# Patient Record
Sex: FEMALE | Race: WHITE | NOT HISPANIC OR LATINO | URBAN - METROPOLITAN AREA
[De-identification: names, ages, dates, MRNs, and addresses within clinical notes are randomized per-mention and may not be internally consistent; named-entity substitution may affect disease eponyms.]

---

## 2017-01-20 ENCOUNTER — INPATIENT (INPATIENT)
Facility: HOSPITAL | Age: 72
LOS: 2 days | Discharge: HOME | DRG: 196 | End: 2017-01-23
Attending: HOSPITALIST | Admitting: HOSPITALIST
Payer: COMMERCIAL

## 2017-01-20 VITALS
WEIGHT: 154.98 LBS | TEMPERATURE: 98 F | HEIGHT: 66 IN | HEART RATE: 132 BPM | OXYGEN SATURATION: 95 % | DIASTOLIC BLOOD PRESSURE: 61 MMHG | RESPIRATION RATE: 18 BRPM | SYSTOLIC BLOOD PRESSURE: 114 MMHG

## 2017-01-20 DIAGNOSIS — Z90.710 ACQUIRED ABSENCE OF BOTH CERVIX AND UTERUS: Chronic | ICD-10-CM

## 2017-01-20 DIAGNOSIS — E11.9 TYPE 2 DIABETES MELLITUS WITHOUT COMPLICATIONS: ICD-10-CM

## 2017-01-20 DIAGNOSIS — I27.2 OTHER SECONDARY PULMONARY HYPERTENSION: ICD-10-CM

## 2017-01-20 DIAGNOSIS — Z29.9 ENCOUNTER FOR PROPHYLACTIC MEASURES, UNSPECIFIED: ICD-10-CM

## 2017-01-20 DIAGNOSIS — R06.02 SHORTNESS OF BREATH: ICD-10-CM

## 2017-01-20 DIAGNOSIS — K52.9 NONINFECTIVE GASTROENTERITIS AND COLITIS, UNSPECIFIED: ICD-10-CM

## 2017-01-20 LAB
ALBUMIN SERPL ELPH-MCNC: 3.7 G/DL — SIGNIFICANT CHANGE UP (ref 3.3–5)
ALP SERPL-CCNC: 50 U/L — SIGNIFICANT CHANGE UP (ref 40–120)
ALT FLD-CCNC: 15 U/L — SIGNIFICANT CHANGE UP (ref 12–78)
ANION GAP SERPL CALC-SCNC: 9 MMOL/L — SIGNIFICANT CHANGE UP (ref 5–17)
AST SERPL-CCNC: 17 U/L — SIGNIFICANT CHANGE UP (ref 15–37)
BASE EXCESS BLDA CALC-SCNC: 5.3 MMOL/L — HIGH (ref -2–2)
BILIRUB SERPL-MCNC: 0.6 MG/DL — SIGNIFICANT CHANGE UP (ref 0.2–1.2)
BLOOD GAS COMMENTS ARTERIAL: SIGNIFICANT CHANGE UP
BUN SERPL-MCNC: 27 MG/DL — HIGH (ref 7–23)
CALCIUM SERPL-MCNC: 9.1 MG/DL — SIGNIFICANT CHANGE UP (ref 8.5–10.1)
CHLORIDE SERPL-SCNC: 95 MMOL/L — LOW (ref 96–108)
CK SERPL-CCNC: 28 U/L — SIGNIFICANT CHANGE UP (ref 26–192)
CO2 SERPL-SCNC: 38 MMOL/L — HIGH (ref 22–31)
CREAT SERPL-MCNC: 0.8 MG/DL — SIGNIFICANT CHANGE UP (ref 0.5–1.3)
CRP SERPL-MCNC: 0.86 MG/DL — HIGH (ref 0–0.4)
D DIMER BLD IA.RAPID-MCNC: <150 NG/ML DDU — SIGNIFICANT CHANGE UP
D DIMER BLD IA.RAPID-MCNC: <150 NG/ML DDU — SIGNIFICANT CHANGE UP
GLUCOSE SERPL-MCNC: 170 MG/DL — HIGH (ref 70–99)
HCO3 BLDA-SCNC: 29 MMOL/L — HIGH (ref 23–27)
HCT VFR BLD CALC: 45.3 % — HIGH (ref 34.5–45)
HGB BLD-MCNC: 14.6 G/DL — SIGNIFICANT CHANGE UP (ref 11.5–15.5)
HOROWITZ INDEX BLDA+IHG-RTO: 40 — SIGNIFICANT CHANGE UP
INR BLD: 1.11 RATIO — SIGNIFICANT CHANGE UP (ref 0.88–1.16)
MCHC RBC-ENTMCNC: 30.5 PG — SIGNIFICANT CHANGE UP (ref 27–34)
MCHC RBC-ENTMCNC: 32.1 GM/DL — SIGNIFICANT CHANGE UP (ref 32–36)
MCV RBC AUTO: 95 FL — SIGNIFICANT CHANGE UP (ref 80–100)
NT-PROBNP SERPL-SCNC: 2498 PG/ML — HIGH (ref 0–125)
PCO2 BLDA: 46 MMHG — SIGNIFICANT CHANGE UP (ref 32–46)
PH BLDA: 7.42 — SIGNIFICANT CHANGE UP (ref 7.35–7.45)
PLATELET # BLD AUTO: 319 K/UL — SIGNIFICANT CHANGE UP (ref 150–400)
PO2 BLDA: 96 MMHG — SIGNIFICANT CHANGE UP (ref 74–108)
POTASSIUM SERPL-MCNC: 3.8 MMOL/L — SIGNIFICANT CHANGE UP (ref 3.5–5.3)
POTASSIUM SERPL-SCNC: 3.8 MMOL/L — SIGNIFICANT CHANGE UP (ref 3.5–5.3)
PROCALCITONIN SERPL-MCNC: <0.05 NG/ML — SIGNIFICANT CHANGE UP (ref 0–0.05)
PROT SERPL-MCNC: 7.2 G/DL — SIGNIFICANT CHANGE UP (ref 6–8.3)
PROTHROM AB SERPL-ACNC: 12.3 SEC — SIGNIFICANT CHANGE UP (ref 10–13.1)
RAPID RVP RESULT: SIGNIFICANT CHANGE UP
RBC # BLD: 4.77 M/UL — SIGNIFICANT CHANGE UP (ref 3.8–5.2)
RBC # FLD: 12.7 % — SIGNIFICANT CHANGE UP (ref 10.3–14.5)
SAO2 % BLDA: 94 % — SIGNIFICANT CHANGE UP (ref 92–96)
SODIUM SERPL-SCNC: 142 MMOL/L — SIGNIFICANT CHANGE UP (ref 135–145)
TROPONIN I SERPL-MCNC: 0.02 NG/ML — SIGNIFICANT CHANGE UP (ref 0.01–0.04)
TSH SERPL-MCNC: 3.67 UIU/ML — SIGNIFICANT CHANGE UP (ref 0.36–3.74)
WBC # BLD: 18.8 K/UL — HIGH (ref 3.8–10.5)
WBC # FLD AUTO: 18.8 K/UL — HIGH (ref 3.8–10.5)

## 2017-01-20 PROCEDURE — 93970 EXTREMITY STUDY: CPT | Mod: 26

## 2017-01-20 PROCEDURE — 71010: CPT | Mod: 26

## 2017-01-20 PROCEDURE — 99223 1ST HOSP IP/OBS HIGH 75: CPT | Mod: AI,GC

## 2017-01-20 PROCEDURE — 93010 ELECTROCARDIOGRAM REPORT: CPT

## 2017-01-20 PROCEDURE — 99285 EMERGENCY DEPT VISIT HI MDM: CPT | Mod: 25

## 2017-01-20 RX ORDER — ENOXAPARIN SODIUM 100 MG/ML
40 INJECTION SUBCUTANEOUS EVERY 24 HOURS
Qty: 0 | Refills: 0 | Status: DISCONTINUED | OUTPATIENT
Start: 2017-01-20 | End: 2017-01-23

## 2017-01-20 RX ORDER — ALBUTEROL 90 UG/1
2 AEROSOL, METERED ORAL EVERY 6 HOURS
Qty: 0 | Refills: 0 | Status: DISCONTINUED | OUTPATIENT
Start: 2017-01-20 | End: 2017-01-23

## 2017-01-20 RX ORDER — INSULIN LISPRO 100/ML
VIAL (ML) SUBCUTANEOUS AT BEDTIME
Qty: 0 | Refills: 0 | Status: DISCONTINUED | OUTPATIENT
Start: 2017-01-20 | End: 2017-01-23

## 2017-01-20 RX ORDER — SODIUM CHLORIDE 9 MG/ML
500 INJECTION INTRAMUSCULAR; INTRAVENOUS; SUBCUTANEOUS ONCE
Qty: 0 | Refills: 0 | Status: COMPLETED | OUTPATIENT
Start: 2017-01-20 | End: 2017-01-20

## 2017-01-20 RX ORDER — PANTOPRAZOLE SODIUM 20 MG/1
40 TABLET, DELAYED RELEASE ORAL
Qty: 0 | Refills: 0 | Status: DISCONTINUED | OUTPATIENT
Start: 2017-01-20 | End: 2017-01-23

## 2017-01-20 RX ORDER — GLUCAGON INJECTION, SOLUTION 0.5 MG/.1ML
1 INJECTION, SOLUTION SUBCUTANEOUS ONCE
Qty: 0 | Refills: 0 | Status: DISCONTINUED | OUTPATIENT
Start: 2017-01-20 | End: 2017-01-23

## 2017-01-20 RX ORDER — ALBUTEROL 90 UG/1
2.5 AEROSOL, METERED ORAL EVERY 8 HOURS
Qty: 0 | Refills: 0 | Status: DISCONTINUED | OUTPATIENT
Start: 2017-01-20 | End: 2017-01-23

## 2017-01-20 RX ORDER — TADALAFIL 10 MG/1
40 TABLET, FILM COATED ORAL
Qty: 0 | Refills: 0 | Status: DISCONTINUED | OUTPATIENT
Start: 2017-01-21 | End: 2017-01-23

## 2017-01-20 RX ORDER — DEXTROSE 50 % IN WATER 50 %
1 SYRINGE (ML) INTRAVENOUS ONCE
Qty: 0 | Refills: 0 | Status: DISCONTINUED | OUTPATIENT
Start: 2017-01-20 | End: 2017-01-23

## 2017-01-20 RX ORDER — BUDESONIDE, MICRONIZED 100 %
1 POWDER (GRAM) MISCELLANEOUS
Qty: 0 | Refills: 0 | Status: DISCONTINUED | OUTPATIENT
Start: 2017-01-20 | End: 2017-01-23

## 2017-01-20 RX ORDER — SIMVASTATIN 20 MG/1
20 TABLET, FILM COATED ORAL AT BEDTIME
Qty: 0 | Refills: 0 | Status: DISCONTINUED | OUTPATIENT
Start: 2017-01-20 | End: 2017-01-23

## 2017-01-20 RX ORDER — BUDESONIDE, MICRONIZED 100 %
1 POWDER (GRAM) MISCELLANEOUS
Qty: 0 | Refills: 0 | COMMUNITY

## 2017-01-20 RX ORDER — MORPHINE SULFATE 50 MG/1
0.5 CAPSULE, EXTENDED RELEASE ORAL
Qty: 0 | Refills: 0 | Status: DISCONTINUED | OUTPATIENT
Start: 2017-01-20 | End: 2017-01-23

## 2017-01-20 RX ORDER — ONDANSETRON 8 MG/1
4 TABLET, FILM COATED ORAL ONCE
Qty: 0 | Refills: 0 | Status: COMPLETED | OUTPATIENT
Start: 2017-01-20 | End: 2017-01-20

## 2017-01-20 RX ORDER — IPRATROPIUM/ALBUTEROL SULFATE 18-103MCG
3 AEROSOL WITH ADAPTER (GRAM) INHALATION ONCE
Qty: 0 | Refills: 0 | Status: COMPLETED | OUTPATIENT
Start: 2017-01-20 | End: 2017-01-20

## 2017-01-20 RX ORDER — SODIUM CHLORIDE 9 MG/ML
1000 INJECTION INTRAMUSCULAR; INTRAVENOUS; SUBCUTANEOUS ONCE
Qty: 0 | Refills: 0 | Status: COMPLETED | OUTPATIENT
Start: 2017-01-20 | End: 2017-01-20

## 2017-01-20 RX ORDER — INSULIN LISPRO 100/ML
VIAL (ML) SUBCUTANEOUS
Qty: 0 | Refills: 0 | Status: DISCONTINUED | OUTPATIENT
Start: 2017-01-20 | End: 2017-01-23

## 2017-01-20 RX ORDER — DEXTROSE 50 % IN WATER 50 %
25 SYRINGE (ML) INTRAVENOUS ONCE
Qty: 0 | Refills: 0 | Status: DISCONTINUED | OUTPATIENT
Start: 2017-01-20 | End: 2017-01-23

## 2017-01-20 RX ORDER — MYCOPHENOLATE MOFETIL 250 MG/1
1000 CAPSULE ORAL
Qty: 0 | Refills: 0 | Status: DISCONTINUED | OUTPATIENT
Start: 2017-01-20 | End: 2017-01-23

## 2017-01-20 RX ORDER — DEXTROSE 50 % IN WATER 50 %
12.5 SYRINGE (ML) INTRAVENOUS ONCE
Qty: 0 | Refills: 0 | Status: DISCONTINUED | OUTPATIENT
Start: 2017-01-20 | End: 2017-01-23

## 2017-01-20 RX ORDER — MYCOPHENOLATE MOFETIL 250 MG/1
1000 CAPSULE ORAL
Qty: 0 | Refills: 0 | COMMUNITY

## 2017-01-20 RX ORDER — SODIUM CHLORIDE 9 MG/ML
1000 INJECTION, SOLUTION INTRAVENOUS
Qty: 0 | Refills: 0 | Status: DISCONTINUED | OUTPATIENT
Start: 2017-01-20 | End: 2017-01-23

## 2017-01-20 RX ORDER — TREPROSTINIL 48 UG/1
18 INHALANT ORAL
Qty: 0 | Refills: 0 | Status: DISCONTINUED | OUTPATIENT
Start: 2017-01-20 | End: 2017-01-20

## 2017-01-20 RX ORDER — DIGOXIN 250 MCG
0.12 TABLET ORAL DAILY
Qty: 0 | Refills: 0 | Status: DISCONTINUED | OUTPATIENT
Start: 2017-01-20 | End: 2017-01-21

## 2017-01-20 RX ORDER — SODIUM CHLORIDE 9 MG/ML
500 INJECTION INTRAMUSCULAR; INTRAVENOUS; SUBCUTANEOUS
Qty: 0 | Refills: 0 | Status: DISCONTINUED | OUTPATIENT
Start: 2017-01-20 | End: 2017-01-20

## 2017-01-20 RX ADMIN — Medication 0.12 MILLIGRAM(S): at 17:35

## 2017-01-20 RX ADMIN — MORPHINE SULFATE 0.5 MILLIGRAM(S): 50 CAPSULE, EXTENDED RELEASE ORAL at 10:58

## 2017-01-20 RX ADMIN — Medication 2: at 08:17

## 2017-01-20 RX ADMIN — ALBUTEROL 2.5 MILLIGRAM(S): 90 AEROSOL, METERED ORAL at 07:39

## 2017-01-20 RX ADMIN — Medication 3 MILLILITER(S): at 02:45

## 2017-01-20 RX ADMIN — ONDANSETRON 4 MILLIGRAM(S): 8 TABLET, FILM COATED ORAL at 02:44

## 2017-01-20 RX ADMIN — Medication 125 MILLIGRAM(S): at 02:44

## 2017-01-20 RX ADMIN — Medication 1: at 17:05

## 2017-01-20 RX ADMIN — SODIUM CHLORIDE 250 MILLILITER(S): 9 INJECTION INTRAMUSCULAR; INTRAVENOUS; SUBCUTANEOUS at 17:32

## 2017-01-20 RX ADMIN — Medication 40 MILLIGRAM(S): at 17:05

## 2017-01-20 RX ADMIN — SIMVASTATIN 20 MILLIGRAM(S): 20 TABLET, FILM COATED ORAL at 23:19

## 2017-01-20 RX ADMIN — MORPHINE SULFATE 0.5 MILLIGRAM(S): 50 CAPSULE, EXTENDED RELEASE ORAL at 11:15

## 2017-01-20 RX ADMIN — PANTOPRAZOLE SODIUM 40 MILLIGRAM(S): 20 TABLET, DELAYED RELEASE ORAL at 08:17

## 2017-01-20 RX ADMIN — Medication 2: at 11:43

## 2017-01-20 RX ADMIN — SODIUM CHLORIDE 1000 MILLILITER(S): 9 INJECTION INTRAMUSCULAR; INTRAVENOUS; SUBCUTANEOUS at 02:43

## 2017-01-20 RX ADMIN — MYCOPHENOLATE MOFETIL 1000 MILLIGRAM(S): 250 CAPSULE ORAL at 17:06

## 2017-01-20 RX ADMIN — ENOXAPARIN SODIUM 40 MILLIGRAM(S): 100 INJECTION SUBCUTANEOUS at 10:59

## 2017-01-20 RX ADMIN — ALBUTEROL 2.5 MILLIGRAM(S): 90 AEROSOL, METERED ORAL at 16:17

## 2017-01-20 RX ADMIN — ALBUTEROL 2.5 MILLIGRAM(S): 90 AEROSOL, METERED ORAL at 23:07

## 2017-01-20 NOTE — GOALS OF CARE CONVERSATION - PERSONAL ADVANCE DIRECTIVE - NS PRO AD PATIENT TYPE ON CHART
Medical Orders for Life-Sustaining Treatment (MOLST)/Health Care Proxy (HCP)/Do Not Resuscitate (DNR)

## 2017-01-20 NOTE — ED PROVIDER NOTE - CARE PLAN
Principal Discharge DX:	SOB (shortness of breath)  Secondary Diagnosis:	Nausea & vomiting Principal Discharge DX:	SOB (shortness of breath)  Secondary Diagnosis:	Pulmonary fibrosis  Secondary Diagnosis:	Nausea & vomiting

## 2017-01-20 NOTE — ED PROVIDER NOTE - SIGNIFICANT NEGATIVE FINDINGS
no headache, no chest pain, no Syncope, no palpitations, no abdominal pain, no diarrhea, no urinary symptoms, no bleeding. no neuro changes.

## 2017-01-20 NOTE — H&P ADULT. - RS GEN PE MLT RESP DETAILS PC
respirations labored/breath sounds equal/diminished breath sounds, L/airway patent/diminished breath sounds, R/rhonchi

## 2017-01-20 NOTE — H&P ADULT. - PROBLEM SELECTOR PLAN 1
cont. on solumedrol 60 q8  Pulm (Rashaad) consult, f/u recs  duonebs q6  cont on nonrebreather temporary, await for pulm recs, possible ABG  monitor O2 sats cont. on solumedrol 60 q8  Pulm (Rashaad) consult, f/u recs  duonebs q6  cont on nonrebreather temporary, await for pulm recs, possible ABG/Bipap  monitor O2 sats  continue home meds ( check w [facility] upon opening)   Patient to bring home meds if unavailable cont. on solumedrol 60 q8  Pulm (Rsahaad) consult, f/u recs  duonebs  cont on nonrebreather temporary, await for pulm recs, possible ABG/Bipap  monitor O2 sats  continue home meds (check w [facility] upon opening)   Patient to bring home meds if unavailable Admit to tele  cont. on solumedrol 60 q8  Pulm (Rashaad) consult, f/u recs  duonebs  cont cellcept, mucinex DM, hctz   cont on nonrebreather temporary, await for pulm recs, possible ABG/Bipap  monitor O2 sats  continue home meds (check w [facility] upon opening)   Patient to bring home meds if unavailable-- Adcirca, Tyvaso

## 2017-01-20 NOTE — ED ADULT NURSE NOTE - OBJECTIVE STATEMENT
Pt presents to the Ed via ambulance c/o difficulty breathing, nausea, vomiting.. Pt uses home 02 with a compressor. Pt presents to the Ed via ambulance c/o difficulty breathing, nausea, vomiting.. Pt uses home 02 with a compressor.. EKG done, pt [id number] on cardiac monitor 100% NRB mask in place. Skin warm and dry, + sensation, + capillary refill <2 sec.

## 2017-01-20 NOTE — H&P ADULT. - ASSESSMENT
72 y/o F with PMHx Pulm HTN, DM2 presents with SOB, vomiting being admitted for Pulm HTN, Gastroenteritis.

## 2017-01-20 NOTE — H&P ADULT. - PROBLEM SELECTOR PLAN 4
DVT ppx: Lovenox 40 subQ q24  HLD: cont simvastatin DVT ppx: Lovenox 40 subQ q24  HLD: cont simvastatin  GI: Protonix

## 2017-01-20 NOTE — ED PROVIDER NOTE - CONSTITUTIONAL, MLM
normal... Well appearing, well nourished, awake, alert, oriented to person, place, time/situation and in moderate  distress.

## 2017-01-20 NOTE — GOALS OF CARE CONVERSATION - PERSONAL ADVANCE DIRECTIVE - NS PRO AD PATIENT TYPE
Health Care Proxy (HCP)/Do Not Resuscitate (DNR)/Medical Orders for Life-Sustaining Treatment (MOLST)/Living Will

## 2017-01-20 NOTE — H&P ADULT. - HISTORY OF PRESENT ILLNESS
72 y/o w/f h/o pulmonary fibrosis, pulmonary HTN, DM. C/O SOB following Nausea and four episodes of vomiting. No chest pain, no abdominal pain 70 y/o F with PMHx Pulm HTN, DM2 presents with SOB, vomiting. Pt states sx began 3-4 days ago, didn't feel right, getting very tired, associated with stomach uneasiness. Pt lives with kids who were sick with likely viral illness (+n/v/d),  also got sick. Yesterday at 0200 pt had difficulty breathing, diaphoretic, vomiting x 4 episodes, with crampy abdominal pain, soft formed stool. Today, had decreased appetite, low blood sugar. Pt had recurrent episodes of vomiting in bed overnight, prompting her to come to ED. Pt closely followed by PMD/Pulmonologist @ [place]. Currently pt denies fever, chills, chest pain, abd pain, n/v, numbness tingling.     In ED, VS: T98, , /61, RR 18 SpO2 95% on Supp O2   ED labs significant for: wbc 18.8, BNP 2498, Trop (-)  Pt placed on Non-rebreather, received Duonebs x1, Solumedrol 125mg IV x1, Zofran 4mg IV x1  CXR official read pending

## 2017-01-20 NOTE — ED ADULT NURSE REASSESSMENT NOTE - NS ED NURSE REASSESS COMMENT FT1
Pt received sitting up on stretcher with HOB elevated 90 degrees, O2 100%via NRB I progress. Fluid bolus to IV left arm infusing well. BBS  with b/l mild expiratory wheezes. To be admitted, sinus tach on monitor

## 2017-01-20 NOTE — ED PROVIDER NOTE - OBJECTIVE STATEMENT
70 y/o w/f h/o pulmonary fibrosis, pulmonary HTN, DM. C/O SOB following Nausea and four episodes of vomiting. No chest pain, no abdominal pain

## 2017-01-21 LAB
ANION GAP SERPL CALC-SCNC: 6 MMOL/L — SIGNIFICANT CHANGE UP (ref 5–17)
BUN SERPL-MCNC: 20 MG/DL — SIGNIFICANT CHANGE UP (ref 7–23)
CALCIUM SERPL-MCNC: 7.8 MG/DL — LOW (ref 8.5–10.1)
CHLORIDE SERPL-SCNC: 103 MMOL/L — SIGNIFICANT CHANGE UP (ref 96–108)
CO2 SERPL-SCNC: 33 MMOL/L — HIGH (ref 22–31)
CREAT SERPL-MCNC: 0.58 MG/DL — SIGNIFICANT CHANGE UP (ref 0.5–1.3)
GLUCOSE SERPL-MCNC: 154 MG/DL — HIGH (ref 70–99)
HCT VFR BLD CALC: 37.6 % — SIGNIFICANT CHANGE UP (ref 34.5–45)
HGB BLD-MCNC: 11.6 G/DL — SIGNIFICANT CHANGE UP (ref 11.5–15.5)
MAGNESIUM SERPL-MCNC: 2.3 MG/DL — SIGNIFICANT CHANGE UP (ref 1.8–2.4)
MCHC RBC-ENTMCNC: 30.1 PG — SIGNIFICANT CHANGE UP (ref 27–34)
MCHC RBC-ENTMCNC: 31 GM/DL — LOW (ref 32–36)
MCV RBC AUTO: 97.3 FL — SIGNIFICANT CHANGE UP (ref 80–100)
PLATELET # BLD AUTO: 240 K/UL — SIGNIFICANT CHANGE UP (ref 150–400)
POTASSIUM SERPL-MCNC: 4.3 MMOL/L — SIGNIFICANT CHANGE UP (ref 3.5–5.3)
POTASSIUM SERPL-SCNC: 4.3 MMOL/L — SIGNIFICANT CHANGE UP (ref 3.5–5.3)
RBC # BLD: 3.86 M/UL — SIGNIFICANT CHANGE UP (ref 3.8–5.2)
RBC # FLD: 13.6 % — SIGNIFICANT CHANGE UP (ref 10.3–14.5)
SODIUM SERPL-SCNC: 142 MMOL/L — SIGNIFICANT CHANGE UP (ref 135–145)
WBC # BLD: 9.3 K/UL — SIGNIFICANT CHANGE UP (ref 3.8–10.5)
WBC # FLD AUTO: 9.3 K/UL — SIGNIFICANT CHANGE UP (ref 3.8–10.5)

## 2017-01-21 PROCEDURE — 99233 SBSQ HOSP IP/OBS HIGH 50: CPT

## 2017-01-21 RX ORDER — NYSTATIN CREAM 100000 [USP'U]/G
1 CREAM TOPICAL
Qty: 0 | Refills: 0 | Status: DISCONTINUED | OUTPATIENT
Start: 2017-01-21 | End: 2017-01-23

## 2017-01-21 RX ADMIN — NYSTATIN CREAM 1 APPLICATION(S): 100000 CREAM TOPICAL at 17:04

## 2017-01-21 RX ADMIN — ALBUTEROL 2.5 MILLIGRAM(S): 90 AEROSOL, METERED ORAL at 15:10

## 2017-01-21 RX ADMIN — ALBUTEROL 2.5 MILLIGRAM(S): 90 AEROSOL, METERED ORAL at 09:49

## 2017-01-21 RX ADMIN — MYCOPHENOLATE MOFETIL 1000 MILLIGRAM(S): 250 CAPSULE ORAL at 06:27

## 2017-01-21 RX ADMIN — Medication 40 MILLIGRAM(S): at 17:04

## 2017-01-21 RX ADMIN — ENOXAPARIN SODIUM 40 MILLIGRAM(S): 100 INJECTION SUBCUTANEOUS at 09:50

## 2017-01-21 RX ADMIN — SIMVASTATIN 20 MILLIGRAM(S): 20 TABLET, FILM COATED ORAL at 22:58

## 2017-01-21 RX ADMIN — Medication 40 MILLIGRAM(S): at 06:29

## 2017-01-21 RX ADMIN — Medication 2: at 11:56

## 2017-01-21 RX ADMIN — ALBUTEROL 2.5 MILLIGRAM(S): 90 AEROSOL, METERED ORAL at 23:03

## 2017-01-21 RX ADMIN — TADALAFIL 40 MILLIGRAM(S): 10 TABLET, FILM COATED ORAL at 07:55

## 2017-01-21 RX ADMIN — MYCOPHENOLATE MOFETIL 1000 MILLIGRAM(S): 250 CAPSULE ORAL at 17:04

## 2017-01-21 RX ADMIN — Medication 0.12 MILLIGRAM(S): at 06:28

## 2017-01-21 RX ADMIN — NYSTATIN CREAM 1 APPLICATION(S): 100000 CREAM TOPICAL at 11:56

## 2017-01-21 RX ADMIN — PANTOPRAZOLE SODIUM 40 MILLIGRAM(S): 20 TABLET, DELAYED RELEASE ORAL at 06:29

## 2017-01-21 RX ADMIN — Medication 1: at 17:04

## 2017-01-22 LAB
ANION GAP SERPL CALC-SCNC: 8 MMOL/L — SIGNIFICANT CHANGE UP (ref 5–17)
BUN SERPL-MCNC: 27 MG/DL — HIGH (ref 7–23)
CALCIUM SERPL-MCNC: 8.5 MG/DL — SIGNIFICANT CHANGE UP (ref 8.5–10.1)
CHLORIDE SERPL-SCNC: 100 MMOL/L — SIGNIFICANT CHANGE UP (ref 96–108)
CO2 SERPL-SCNC: 32 MMOL/L — HIGH (ref 22–31)
CREAT SERPL-MCNC: 0.71 MG/DL — SIGNIFICANT CHANGE UP (ref 0.5–1.3)
GLUCOSE SERPL-MCNC: 178 MG/DL — HIGH (ref 70–99)
HBA1C BLD-MCNC: 6.4 % — HIGH (ref 4–5.6)
HCT VFR BLD CALC: 38.9 % — SIGNIFICANT CHANGE UP (ref 34.5–45)
HGB BLD-MCNC: 11.9 G/DL — SIGNIFICANT CHANGE UP (ref 11.5–15.5)
MCHC RBC-ENTMCNC: 30.2 PG — SIGNIFICANT CHANGE UP (ref 27–34)
MCHC RBC-ENTMCNC: 30.6 GM/DL — LOW (ref 32–36)
MCV RBC AUTO: 99 FL — SIGNIFICANT CHANGE UP (ref 80–100)
PLATELET # BLD AUTO: 255 K/UL — SIGNIFICANT CHANGE UP (ref 150–400)
POTASSIUM SERPL-MCNC: 4.5 MMOL/L — SIGNIFICANT CHANGE UP (ref 3.5–5.3)
POTASSIUM SERPL-SCNC: 4.5 MMOL/L — SIGNIFICANT CHANGE UP (ref 3.5–5.3)
RBC # BLD: 3.93 M/UL — SIGNIFICANT CHANGE UP (ref 3.8–5.2)
RBC # FLD: 13.7 % — SIGNIFICANT CHANGE UP (ref 10.3–14.5)
SODIUM SERPL-SCNC: 140 MMOL/L — SIGNIFICANT CHANGE UP (ref 135–145)
WBC # BLD: 9.5 K/UL — SIGNIFICANT CHANGE UP (ref 3.8–10.5)
WBC # FLD AUTO: 9.5 K/UL — SIGNIFICANT CHANGE UP (ref 3.8–10.5)

## 2017-01-22 PROCEDURE — 99233 SBSQ HOSP IP/OBS HIGH 50: CPT

## 2017-01-22 RX ORDER — DOCUSATE SODIUM 100 MG
100 CAPSULE ORAL THREE TIMES A DAY
Qty: 0 | Refills: 0 | Status: DISCONTINUED | OUTPATIENT
Start: 2017-01-22 | End: 2017-01-23

## 2017-01-22 RX ORDER — SENNA PLUS 8.6 MG/1
2 TABLET ORAL AT BEDTIME
Qty: 0 | Refills: 0 | Status: DISCONTINUED | OUTPATIENT
Start: 2017-01-22 | End: 2017-01-23

## 2017-01-22 RX ADMIN — Medication 30 MILLIGRAM(S): at 18:32

## 2017-01-22 RX ADMIN — ALBUTEROL 2.5 MILLIGRAM(S): 90 AEROSOL, METERED ORAL at 14:23

## 2017-01-22 RX ADMIN — Medication 1 DROP(S): at 22:58

## 2017-01-22 RX ADMIN — ALBUTEROL 2 PUFF(S): 90 AEROSOL, METERED ORAL at 08:32

## 2017-01-22 RX ADMIN — Medication 1: at 08:44

## 2017-01-22 RX ADMIN — NYSTATIN CREAM 1 APPLICATION(S): 100000 CREAM TOPICAL at 06:46

## 2017-01-22 RX ADMIN — MYCOPHENOLATE MOFETIL 1000 MILLIGRAM(S): 250 CAPSULE ORAL at 18:33

## 2017-01-22 RX ADMIN — TADALAFIL 40 MILLIGRAM(S): 10 TABLET, FILM COATED ORAL at 08:31

## 2017-01-22 RX ADMIN — PANTOPRAZOLE SODIUM 40 MILLIGRAM(S): 20 TABLET, DELAYED RELEASE ORAL at 06:47

## 2017-01-22 RX ADMIN — ALBUTEROL 2.5 MILLIGRAM(S): 90 AEROSOL, METERED ORAL at 07:59

## 2017-01-22 RX ADMIN — Medication 1 PUFF(S): at 19:55

## 2017-01-22 RX ADMIN — Medication 100 MILLIGRAM(S): at 22:58

## 2017-01-22 RX ADMIN — Medication 1 DROP(S): at 13:36

## 2017-01-22 RX ADMIN — SENNA PLUS 2 TABLET(S): 8.6 TABLET ORAL at 22:58

## 2017-01-22 RX ADMIN — Medication 100 MILLIGRAM(S): at 06:47

## 2017-01-22 RX ADMIN — Medication 3: at 12:21

## 2017-01-22 RX ADMIN — NYSTATIN CREAM 1 APPLICATION(S): 100000 CREAM TOPICAL at 18:36

## 2017-01-22 RX ADMIN — Medication 1 DROP(S): at 06:47

## 2017-01-22 RX ADMIN — MYCOPHENOLATE MOFETIL 1000 MILLIGRAM(S): 250 CAPSULE ORAL at 06:47

## 2017-01-22 RX ADMIN — Medication 30 MILLIGRAM(S): at 06:47

## 2017-01-22 RX ADMIN — ALBUTEROL 2.5 MILLIGRAM(S): 90 AEROSOL, METERED ORAL at 23:04

## 2017-01-22 RX ADMIN — SIMVASTATIN 20 MILLIGRAM(S): 20 TABLET, FILM COATED ORAL at 22:58

## 2017-01-22 RX ADMIN — ENOXAPARIN SODIUM 40 MILLIGRAM(S): 100 INJECTION SUBCUTANEOUS at 10:05

## 2017-01-23 VITALS
HEART RATE: 96 BPM | TEMPERATURE: 98 F | SYSTOLIC BLOOD PRESSURE: 130 MMHG | OXYGEN SATURATION: 98 % | DIASTOLIC BLOOD PRESSURE: 83 MMHG | RESPIRATION RATE: 20 BRPM

## 2017-01-23 PROCEDURE — 99239 HOSP IP/OBS DSCHRG MGMT >30: CPT

## 2017-01-23 RX ADMIN — NYSTATIN CREAM 1 APPLICATION(S): 100000 CREAM TOPICAL at 06:22

## 2017-01-23 RX ADMIN — Medication 20 MILLIGRAM(S): at 06:21

## 2017-01-23 RX ADMIN — Medication 1 DROP(S): at 13:11

## 2017-01-23 RX ADMIN — Medication 20 MILLIGRAM(S): at 17:53

## 2017-01-23 RX ADMIN — PANTOPRAZOLE SODIUM 40 MILLIGRAM(S): 20 TABLET, DELAYED RELEASE ORAL at 06:21

## 2017-01-23 RX ADMIN — TADALAFIL 40 MILLIGRAM(S): 10 TABLET, FILM COATED ORAL at 08:44

## 2017-01-23 RX ADMIN — Medication 1 DROP(S): at 06:21

## 2017-01-23 RX ADMIN — Medication 1 PUFF(S): at 08:07

## 2017-01-23 RX ADMIN — Medication 100 MILLIGRAM(S): at 13:11

## 2017-01-23 RX ADMIN — ENOXAPARIN SODIUM 40 MILLIGRAM(S): 100 INJECTION SUBCUTANEOUS at 13:09

## 2017-01-23 RX ADMIN — Medication 1: at 13:10

## 2017-01-23 RX ADMIN — MYCOPHENOLATE MOFETIL 1000 MILLIGRAM(S): 250 CAPSULE ORAL at 06:21

## 2017-01-23 RX ADMIN — ALBUTEROL 2.5 MILLIGRAM(S): 90 AEROSOL, METERED ORAL at 08:04

## 2017-01-23 RX ADMIN — Medication 100 MILLIGRAM(S): at 06:22

## 2017-01-23 RX ADMIN — MYCOPHENOLATE MOFETIL 1000 MILLIGRAM(S): 250 CAPSULE ORAL at 17:53

## 2017-01-23 NOTE — DISCHARGE NOTE ADULT - ADDITIONAL INSTRUCTIONS
Follow up with your outpt Pulmonologist (Dr. Ramírez) within 2-3 days of discharge.   Make an appt with your PMD within 1 week of discharge. Follow up with your outpt Pulmonologist (Dr. Ramírez) on Friday as per appointment you made.   Make an appt with your PMD within 1 week of discharge.

## 2017-01-23 NOTE — DISCHARGE NOTE ADULT - MEDICATION SUMMARY - MEDICATIONS TO TAKE
I will START or STAY ON the medications listed below when I get home from the hospital:    Pulmicort Flexhaler 180 mcg/inh inhalation powder  -- 1 puff(s) inhaled 2 times a day  -- Indication: For interstitial lung disease    predniSONE 20 mg oral tablet  -- 1 tab(s) by mouth 2 times a day  -- Indication: For exacerbation of interstitial lung disease    Tyvaso 0.6 mg/mL inhalation solution  -- 9 puff(s) inhaled 4 times a day  -- Indication: For Pulmonary hypertension    Adcirca 20 mg oral tablet  -- 2 tab(s) by mouth once a day  -- Indication: For Pulmonary hypertension    glimepiride 2 mg oral tablet  -- 1 tab(s) by mouth once a day  -- Indication: For DM (diabetes mellitus)    simvastatin 20 mg oral tablet  -- 1 tab(s) by mouth once a day (at bedtime)  -- Indication: For HLD    Proventil HFA 90 mcg/inh inhalation aerosol  -- 2 puff(s) inhaled 4 times a day, As Needed  -- Indication: For Interstitial lung disease    hydroCHLOROthiazide 25 mg oral tablet  -- 1 tab(s) by mouth once a day  -- Indication: For Pulmonary hypertension    CellCept 500 mg oral tablet  -- 2 tab(s) by mouth 2 times a day  -- Indication: For Intersitital lung disease    NexIUM 40 mg oral delayed release capsule  -- 1 cap(s) by mouth once a day  -- Indication: For GERD    Mucinex Fast-Max DM Max oral liquid  -- 20 milliliter(s) by mouth once a day (in the morning)  -- Indication: For URI I will START or STAY ON the medications listed below when I get home from the hospital:    Pulmicort Flexhaler 180 mcg/inh inhalation powder  -- 1 puff(s) inhaled 2 times a day  -- Indication: For interstitial lung disease    predniSONE 20 mg oral tablet  -- 1 tab(s) by mouth 2 times a day (until your appointment with pulmonologist this Friday)  -- Indication: For Interstitial lung disease (with exacerbation)    Tyvaso 0.6 mg/mL inhalation solution  -- 9 puff(s) inhaled 4 times a day  -- Indication: For Pulmonary hypertension    Adcirca 20 mg oral tablet  -- 2 tab(s) by mouth once a day  -- Indication: For Pulmonary hypertension    glimepiride 2 mg oral tablet  -- 1 tab(s) by mouth once a day  -- Indication: For DM (diabetes mellitus)    simvastatin 20 mg oral tablet  -- 1 tab(s) by mouth once a day (at bedtime)  -- Indication: For HLD    Proventil HFA 90 mcg/inh inhalation aerosol  -- 2 puff(s) inhaled 4 times a day, As Needed  -- Indication: For Interstitial lung disease    hydroCHLOROthiazide 25 mg oral tablet  -- 1 tab(s) by mouth once a day  -- Indication: For HTN    CellCept 500 mg oral tablet  -- 2 tab(s) by mouth 2 times a day  -- Indication: For Intersitital lung disease    NexIUM 40 mg oral delayed release capsule  -- 1 cap(s) by mouth once a day  -- Indication: For GERD    Mucinex Fast-Max DM Max oral liquid  -- 20 milliliter(s) by mouth once a day (in the morning)  -- Indication: For URI I will START or STAY ON the medications listed below when I get home from the hospital:    Pulmicort Flexhaler 180 mcg/inh inhalation powder  -- 1 puff(s) inhaled 2 times a day  -- Indication: For interstitial lung disease    predniSONE 20 mg oral tablet  -- 1 tab(s) by mouth 2 times a day (until your appointment with pulmonologist this Friday)  -- Indication: For Interstitial lung disease with exacerbation    Tyvaso 0.6 mg/mL inhalation solution  -- 9 puff(s) inhaled 4 times a day  -- Indication: For Pulmonary hypertension    Adcirca 20 mg oral tablet  -- 2 tab(s) by mouth once a day  -- Indication: For Pulmonary hypertension    glimepiride 2 mg oral tablet  -- 1 tab(s) by mouth once a day  -- Indication: For DM (diabetes mellitus)    simvastatin 20 mg oral tablet  -- 1 tab(s) by mouth once a day (at bedtime)  -- Indication: For HLD    Proventil HFA 90 mcg/inh inhalation aerosol  -- 2 puff(s) inhaled 4 times a day, As Needed  -- Indication: For Interstitial lung disease    hydroCHLOROthiazide 25 mg oral tablet  -- 1 tab(s) by mouth once a day  -- Indication: For HTN    CellCept 500 mg oral tablet  -- 2 tab(s) by mouth 2 times a day  -- Indication: For Intersitital lung disease    NexIUM 40 mg oral delayed release capsule  -- 1 cap(s) by mouth once a day  -- Indication: For GERD    Mucinex Fast-Max DM Max oral liquid  -- 20 milliliter(s) by mouth once a day (in the morning)  -- Indication: For URI

## 2017-01-23 NOTE — DISCHARGE NOTE ADULT - HOSPITAL COURSE
72 y/o F with PMHx Pulm HTN, DM2 presents with SOB, vomiting being admitted for Pulm HTN, Gastroenteritis. In the ED In ED, VS: T98, , /61, RR 18 SpO2 95% on Supp O2   ED labs significant for: wbc 18.8, BNP 2498, Trop (-)  Pt placed on Non-rebreather, received Duonebs x1, Solumedrol 125mg IV x1, Zofran 4mg IV x1  CXR official read pending 72 y/o F with PMHx Pulm HTN, DM2 presents with SOB, vomiting presented with vomiting and shortness of breath.  In ED, VS: T98, , /61, RR 18 SpO2 95% on Supp O2   ED labs significant for: wbc 18.8, BNP 2498, Trop (-)  Pt placed on Non-rebreather, received Duonebs x1, Solumedrol 125mg IV x1, Zofran 4mg IV x1   CXR showed chronic interstitial lung disease, no acute pulmonary changes were seen.     Patient was admitted telemetry for exacerbation of interstitial lung disease and gastroenteritis. Was evaluated by Pulmonology (Dr. Neil) ILD exacerbation, was continued on NRB and Bipap as needed and IV Solumedrol, tapered to PO steroids when pt symptomatically improved. Gastroenteritis was likely viral in nature. 70 y/o F with PMHx Pulm HTN, DM2 presents with SOB, vomiting presented with vomiting and shortness of breath.  In ED, VS: T98, , /61, RR 18 SpO2 95% on Supp O2   ED labs significant for: wbc 18.8, BNP 2498, Trop (-)  Pt placed on Non-rebreather, received Duonebs x1, Solumedrol 125mg IV x1, Zofran 4mg IV x1   CXR showed chronic interstitial lung disease, no acute pulmonary changes were seen.     Patient was admitted to telemetry for pulmonary HTN likely 2/2 exacerbation of interstitial lung disease and gastroenteritis. Was evaluated by Pulmonology (Dr. Neil) for ILD exacerbation, was continued on NRB as needed and IV Solumedrol, tapered to PO steroids when pt symptomatically improved. Lower extremity dopplers were obtained and negative for DVT, D-dimer was negative, PE was ruled out. RVP was negative. Gastroenteritis was likely viral in nature and elevated wbc count on admission likely 2/2 steroid and normalized during hospital course. Patient symptomatically improved, was sating well on room air, was cleared by Pulm for discharge on PO steroids. Patient's labs remained WNL and was stable for discharge home. 72 y/o F with PMHx Pulm HTN, DM2 presented with vomiting and shortness of breath.  In ED, VS: T98, , /61, RR 18 SpO2 95% on Supp O2   ED labs significant for: wbc 18.8, BNP 2498, Trop (-)  Pt placed on Non-rebreather, received Duonebs x1, Solumedrol 125mg IV x1, Zofran 4mg IV x1   CXR showed chronic interstitial lung disease, no acute pulmonary changes were seen.     Patient was admitted to telemetry for pulmonary HTN likely 2/2 exacerbation of interstitial lung disease from a possible viral illness as pt had similar symptoms as other ill family members. Was evaluated by Pulmonology (Dr. Neil) for ILD exacerbation, was continued on NRB as needed and IV Solumedrol, tapered to PO steroids and home level of oxygen (~6L). Pt symptomatically improved. Lower extremity dopplers were obtained and negative for DVT, D-dimer was negative so PE was ruled out. RVP was negative. Vomiting was likely viral in nature, resolved shortly and elevated wbc count on admission likely 2/2 stress and resolved. Patient symptomatically improved, was sating well on home dose of oxygen, was cleared by Pulm for discharge on PO steroids. Patient's labs remained WNL and was stable for discharge home.     On day of discharge:  ROS: denies SOB, CP, palpitations, n/v/d, abd pain, fever, chills  Phys exam:   VS: 108/74; HR: 87; T: 98; RR: 20 98% on 6L NC  Gen: NAD  HEENT: mmm, EOMI  CV: rrr, S1, S2  Chest: fine rales b/l  Abd: BS+, soft, NT, ND  Extr: no edema  Neuro: AA&Ox3    Time spent on discharge: 45 min

## 2017-01-23 NOTE — DISCHARGE NOTE ADULT - PLAN OF CARE
prevent recurrence of symptoms Was likely from exacerbation of intersitial lung disease. Continue Hydrochlorothiazide. Follow up with your outpt Pulmonologist (Dr. Ramírez) within 2-3 days of discharge.  Continue home medications. You will take Prednisone 20mg twice daily for now until you see your pulmonologist (Dr. Ramírez), should see within 2-3 days after discharge.  Continue other home medications as prior. Continue home meds. Resolved. Continue Simvastatin. resolution Was likely from exacerbation of intersitial lung disease possibly from a viral illness. Follow up with your outpt Pulmonologist (Dr. Ramírez) on Friday as per your appointment. Increase prednisone to 20mg twice daily for the next few days until you see your pulmonologist who will decide whether you still need an increased dose.  Continue rest of home medications. You will take Prednisone 20mg twice daily for now until you see your pulmonologist (Dr. Ramírez), on Friday.  Continue other home medications as prior. Continue home medications. Discuss with your PMD if possible to switch to different class of diabetic medication that has low chance of hypoglycemia. likely due to a viral illness that resolved.

## 2017-01-23 NOTE — DISCHARGE NOTE ADULT - MEDICATION SUMMARY - MEDICATIONS TO STOP TAKING
I will STOP taking the medications listed below when I get home from the hospital:    amLODIPine  --  by mouth    sulfamethoxazole-trimethoprim 800 mg-160 mg oral tablet  -- 1 tab(s) by mouth I will STOP taking the medications listed below when I get home from the hospital:    amLODIPine  --  by mouth

## 2017-01-23 NOTE — DISCHARGE NOTE ADULT - SECONDARY DIAGNOSIS.
Interstitial lung disease DM (diabetes mellitus) Gastroenteritis Hyperlipidemia, unspecified hyperlipidemia type Vomiting

## 2017-01-23 NOTE — DISCHARGE NOTE ADULT - CARE PLAN
Principal Discharge DX:	SOB (shortness of breath) Principal Discharge DX:	Pulmonary hypertension  Goal:	prevent recurrence of symptoms  Instructions for follow-up, activity and diet:	Was likely from exacerbation of intersitial lung disease. Continue Hydrochlorothiazide. Follow up with your outpt Pulmonologist (Dr. Ramírez) within 2-3 days of discharge.  Continue home medications.  Secondary Diagnosis:	Interstitial lung disease  Instructions for follow-up, activity and diet:	You will take Prednisone 20mg twice daily for now until you see your pulmonologist (Dr. Ramírez), should see within 2-3 days after discharge.  Continue other home medications as prior.  Secondary Diagnosis:	DM (diabetes mellitus)  Instructions for follow-up, activity and diet:	Continue home meds.  Secondary Diagnosis:	Gastroenteritis  Instructions for follow-up, activity and diet:	Resolved.  Secondary Diagnosis:	Hyperlipidemia, unspecified hyperlipidemia type  Instructions for follow-up, activity and diet:	Continue Simvastatin. Principal Discharge DX:	Shortness of breath  Goal:	resolution  Instructions for follow-up, activity and diet:	Was likely from exacerbation of intersitial lung disease possibly from a viral illness. Follow up with your outpt Pulmonologist (Dr. Ramírez) on Friday as per your appointment. Increase prednisone to 20mg twice daily for the next few days until you see your pulmonologist who will decide whether you still need an increased dose.  Continue rest of home medications.  Secondary Diagnosis:	Interstitial lung disease  Instructions for follow-up, activity and diet:	You will take Prednisone 20mg twice daily for now until you see your pulmonologist (Dr. Ramírez), on Friday.  Continue other home medications as prior.  Secondary Diagnosis:	DM (diabetes mellitus)  Instructions for follow-up, activity and diet:	Continue home medications. Discuss with your PMD if possible to switch to different class of diabetic medication that has low chance of hypoglycemia.  Secondary Diagnosis:	Vomiting  Instructions for follow-up, activity and diet:	likely due to a viral illness that resolved.  Secondary Diagnosis:	Hyperlipidemia, unspecified hyperlipidemia type  Instructions for follow-up, activity and diet:	Continue Simvastatin.

## 2017-01-23 NOTE — DISCHARGE NOTE ADULT - MEDICATION SUMMARY - MEDICATIONS TO CHANGE
I will SWITCH the dose or number of times a day I take the medications listed below when I get home from the hospital:    predniSONE  -- 17.5 milligram(s) by mouth once a day

## 2017-01-23 NOTE — DISCHARGE NOTE ADULT - PATIENT PORTAL LINK FT
“You can access the [facility] Patient Portal, offered by [facility], by registering with the following website: [email or web address]”

## 2017-01-25 DIAGNOSIS — R00.0 TACHYCARDIA, UNSPECIFIED: ICD-10-CM

## 2017-01-25 DIAGNOSIS — I50.30 UNSPECIFIED DIASTOLIC (CONGESTIVE) HEART FAILURE: ICD-10-CM

## 2017-01-25 DIAGNOSIS — D72.829 ELEVATED WHITE BLOOD CELL COUNT, UNSPECIFIED: ICD-10-CM

## 2017-01-25 DIAGNOSIS — J84.10 PULMONARY FIBROSIS, UNSPECIFIED: ICD-10-CM

## 2017-01-25 DIAGNOSIS — E78.5 HYPERLIPIDEMIA, UNSPECIFIED: ICD-10-CM

## 2017-01-25 DIAGNOSIS — E11.9 TYPE 2 DIABETES MELLITUS WITHOUT COMPLICATIONS: ICD-10-CM

## 2017-01-25 DIAGNOSIS — Z66 DO NOT RESUSCITATE: ICD-10-CM

## 2017-01-25 DIAGNOSIS — Z90.710 ACQUIRED ABSENCE OF BOTH CERVIX AND UTERUS: ICD-10-CM

## 2017-01-25 DIAGNOSIS — R01.1 CARDIAC MURMUR, UNSPECIFIED: ICD-10-CM

## 2017-01-25 DIAGNOSIS — F41.9 ANXIETY DISORDER, UNSPECIFIED: ICD-10-CM

## 2017-01-25 DIAGNOSIS — Z79.52 LONG TERM (CURRENT) USE OF SYSTEMIC STEROIDS: ICD-10-CM

## 2017-01-25 DIAGNOSIS — J84.9 INTERSTITIAL PULMONARY DISEASE, UNSPECIFIED: ICD-10-CM

## 2017-01-25 DIAGNOSIS — I27.2 OTHER SECONDARY PULMONARY HYPERTENSION: ICD-10-CM

## 2017-01-25 DIAGNOSIS — M81.0 AGE-RELATED OSTEOPOROSIS WITHOUT CURRENT PATHOLOGICAL FRACTURE: ICD-10-CM

## 2017-01-25 DIAGNOSIS — M40.209 UNSPECIFIED KYPHOSIS, SITE UNSPECIFIED: ICD-10-CM

## 2017-01-25 DIAGNOSIS — R63.0 ANOREXIA: ICD-10-CM

## 2017-01-25 DIAGNOSIS — M19.90 UNSPECIFIED OSTEOARTHRITIS, UNSPECIFIED SITE: ICD-10-CM

## 2017-01-25 DIAGNOSIS — J96.21 ACUTE AND CHRONIC RESPIRATORY FAILURE WITH HYPOXIA: ICD-10-CM

## 2017-03-12 PROCEDURE — 99221 1ST HOSP IP/OBS SF/LOW 40: CPT

## 2017-03-12 PROCEDURE — 99231 SBSQ HOSP IP/OBS SF/LOW 25: CPT

## 2017-03-12 PROCEDURE — 83036 HEMOGLOBIN GLYCOSYLATED A1C: CPT

## 2017-03-12 PROCEDURE — 85027 COMPLETE CBC AUTOMATED: CPT

## 2017-03-12 PROCEDURE — 80048 BASIC METABOLIC PNL TOTAL CA: CPT

## 2017-03-12 PROCEDURE — 83880 ASSAY OF NATRIURETIC PEPTIDE: CPT

## 2017-03-12 PROCEDURE — 87633 RESP VIRUS 12-25 TARGETS: CPT

## 2017-03-12 PROCEDURE — 96376 TX/PRO/DX INJ SAME DRUG ADON: CPT

## 2017-03-12 PROCEDURE — 87486 CHLMYD PNEUM DNA AMP PROBE: CPT

## 2017-03-12 PROCEDURE — 99285 EMERGENCY DEPT VISIT HI MDM: CPT | Mod: 25

## 2017-03-12 PROCEDURE — 96372 THER/PROPH/DIAG INJ SC/IM: CPT | Mod: 59

## 2017-03-12 PROCEDURE — 94760 N-INVAS EAR/PLS OXIMETRY 1: CPT

## 2017-03-12 PROCEDURE — 36600 WITHDRAWAL OF ARTERIAL BLOOD: CPT

## 2017-03-12 PROCEDURE — 84443 ASSAY THYROID STIM HORMONE: CPT

## 2017-03-12 PROCEDURE — 83735 ASSAY OF MAGNESIUM: CPT

## 2017-03-12 PROCEDURE — 82550 ASSAY OF CK (CPK): CPT

## 2017-03-12 PROCEDURE — 87798 DETECT AGENT NOS DNA AMP: CPT

## 2017-03-12 PROCEDURE — 82803 BLOOD GASES ANY COMBINATION: CPT

## 2017-03-12 PROCEDURE — 85610 PROTHROMBIN TIME: CPT

## 2017-03-12 PROCEDURE — 94640 AIRWAY INHALATION TREATMENT: CPT

## 2017-03-12 PROCEDURE — 96374 THER/PROPH/DIAG INJ IV PUSH: CPT

## 2017-03-12 PROCEDURE — 94660 CPAP INITIATION&MGMT: CPT

## 2017-03-12 PROCEDURE — 84145 PROCALCITONIN (PCT): CPT

## 2017-03-12 PROCEDURE — 93970 EXTREMITY STUDY: CPT

## 2017-03-12 PROCEDURE — 96375 TX/PRO/DX INJ NEW DRUG ADDON: CPT

## 2017-03-12 PROCEDURE — 87581 M.PNEUMON DNA AMP PROBE: CPT

## 2017-03-12 PROCEDURE — 86140 C-REACTIVE PROTEIN: CPT

## 2017-03-12 PROCEDURE — 93005 ELECTROCARDIOGRAM TRACING: CPT

## 2017-03-12 PROCEDURE — 84484 ASSAY OF TROPONIN QUANT: CPT

## 2017-03-12 PROCEDURE — 85379 FIBRIN DEGRADATION QUANT: CPT

## 2017-03-12 PROCEDURE — 80053 COMPREHEN METABOLIC PANEL: CPT

## 2017-03-12 PROCEDURE — 71045 X-RAY EXAM CHEST 1 VIEW: CPT

## 2017-03-31 ENCOUNTER — INPATIENT (INPATIENT)
Facility: HOSPITAL | Age: 72
LOS: 2 days | Discharge: HOME HEALTH | DRG: 197 | End: 2017-04-03
Attending: HOSPITALIST | Admitting: HOSPITALIST
Payer: COMMERCIAL

## 2017-03-31 VITALS
TEMPERATURE: 98 F | RESPIRATION RATE: 22 BRPM | HEART RATE: 96 BPM | SYSTOLIC BLOOD PRESSURE: 103 MMHG | HEIGHT: 63 IN | OXYGEN SATURATION: 100 % | WEIGHT: 147.93 LBS | DIASTOLIC BLOOD PRESSURE: 80 MMHG

## 2017-03-31 DIAGNOSIS — Z90.710 ACQUIRED ABSENCE OF BOTH CERVIX AND UTERUS: Chronic | ICD-10-CM

## 2017-03-31 DIAGNOSIS — R06.02 SHORTNESS OF BREATH: ICD-10-CM

## 2017-03-31 LAB
ALBUMIN SERPL ELPH-MCNC: 3.4 G/DL — SIGNIFICANT CHANGE UP (ref 3.3–5)
ALP SERPL-CCNC: 84 U/L — SIGNIFICANT CHANGE UP (ref 40–120)
ALT FLD-CCNC: 21 U/L — SIGNIFICANT CHANGE UP (ref 12–78)
ANION GAP SERPL CALC-SCNC: 9 MMOL/L — SIGNIFICANT CHANGE UP (ref 5–17)
APTT BLD: 31.8 SEC — SIGNIFICANT CHANGE UP (ref 27.5–37.4)
AST SERPL-CCNC: 17 U/L — SIGNIFICANT CHANGE UP (ref 15–37)
BASE EXCESS BLDA CALC-SCNC: 5.6 MMOL/L — HIGH (ref -2–2)
BASOPHILS # BLD AUTO: 0.1 K/UL — SIGNIFICANT CHANGE UP (ref 0–0.2)
BASOPHILS NFR BLD AUTO: 0.8 % — SIGNIFICANT CHANGE UP (ref 0–2)
BILIRUB SERPL-MCNC: 0.3 MG/DL — SIGNIFICANT CHANGE UP (ref 0.2–1.2)
BLOOD GAS COMMENTS ARTERIAL: SIGNIFICANT CHANGE UP
BLOOD GAS COMMENTS ARTERIAL: SIGNIFICANT CHANGE UP
BUN SERPL-MCNC: 22 MG/DL — SIGNIFICANT CHANGE UP (ref 7–23)
CALCIUM SERPL-MCNC: 8.7 MG/DL — SIGNIFICANT CHANGE UP (ref 8.5–10.1)
CHLORIDE SERPL-SCNC: 99 MMOL/L — SIGNIFICANT CHANGE UP (ref 96–108)
CK MB BLD-MCNC: 6.7 % — HIGH (ref 0–3.5)
CK MB CFR SERPL CALC: 2.2 NG/ML — SIGNIFICANT CHANGE UP (ref 0–3.6)
CK SERPL-CCNC: 33 U/L — SIGNIFICANT CHANGE UP (ref 26–192)
CO2 SERPL-SCNC: 33 MMOL/L — HIGH (ref 22–31)
CREAT SERPL-MCNC: 0.58 MG/DL — SIGNIFICANT CHANGE UP (ref 0.5–1.3)
EOSINOPHIL # BLD AUTO: 0 K/UL — SIGNIFICANT CHANGE UP (ref 0–0.5)
EOSINOPHIL NFR BLD AUTO: 0.2 % — SIGNIFICANT CHANGE UP (ref 0–6)
GLUCOSE SERPL-MCNC: 145 MG/DL — HIGH (ref 70–99)
HCO3 BLDA-SCNC: 29 MMOL/L — HIGH (ref 23–27)
HCT VFR BLD CALC: 41.6 % — SIGNIFICANT CHANGE UP (ref 34.5–45)
HGB BLD-MCNC: 13.2 G/DL — SIGNIFICANT CHANGE UP (ref 11.5–15.5)
HOROWITZ INDEX BLDA+IHG-RTO: 50 — SIGNIFICANT CHANGE UP
INR BLD: 1.19 RATIO — HIGH (ref 0.88–1.16)
LACTATE SERPL-SCNC: 1.3 MMOL/L — SIGNIFICANT CHANGE UP (ref 0.7–2)
LYMPHOCYTES # BLD AUTO: 0.8 K/UL — LOW (ref 1–3.3)
LYMPHOCYTES # BLD AUTO: 9.3 % — LOW (ref 13–44)
MCHC RBC-ENTMCNC: 30.4 PG — SIGNIFICANT CHANGE UP (ref 27–34)
MCHC RBC-ENTMCNC: 31.8 GM/DL — LOW (ref 32–36)
MCV RBC AUTO: 95.6 FL — SIGNIFICANT CHANGE UP (ref 80–100)
MONOCYTES # BLD AUTO: 0.5 K/UL — SIGNIFICANT CHANGE UP (ref 0–0.9)
MONOCYTES NFR BLD AUTO: 5.3 % — SIGNIFICANT CHANGE UP (ref 1–9)
NEUTROPHILS # BLD AUTO: 7.2 K/UL — SIGNIFICANT CHANGE UP (ref 1.8–7.4)
NEUTROPHILS NFR BLD AUTO: 84.4 % — HIGH (ref 43–77)
NT-PROBNP SERPL-SCNC: 4156 PG/ML — HIGH (ref 0–125)
PCO2 BLDA: 53 MMHG — HIGH (ref 32–46)
PH BLDA: 7.38 — SIGNIFICANT CHANGE UP (ref 7.35–7.45)
PLATELET # BLD AUTO: 288 K/UL — SIGNIFICANT CHANGE UP (ref 150–400)
PO2 BLDA: 117 MMHG — HIGH (ref 74–108)
POTASSIUM SERPL-MCNC: 3.7 MMOL/L — SIGNIFICANT CHANGE UP (ref 3.5–5.3)
POTASSIUM SERPL-SCNC: 3.7 MMOL/L — SIGNIFICANT CHANGE UP (ref 3.5–5.3)
PROT SERPL-MCNC: 6.6 G/DL — SIGNIFICANT CHANGE UP (ref 6–8.3)
PROTHROM AB SERPL-ACNC: 13 SEC — HIGH (ref 9.8–12.7)
RBC # BLD: 4.35 M/UL — SIGNIFICANT CHANGE UP (ref 3.8–5.2)
RBC # FLD: 13.6 % — SIGNIFICANT CHANGE UP (ref 10.3–14.5)
SAO2 % BLDA: 98 % — HIGH (ref 92–96)
SODIUM SERPL-SCNC: 141 MMOL/L — SIGNIFICANT CHANGE UP (ref 135–145)
TROPONIN I SERPL-MCNC: 0.04 NG/ML — SIGNIFICANT CHANGE UP (ref 0.01–0.04)
WBC # BLD: 8.6 K/UL — SIGNIFICANT CHANGE UP (ref 3.8–10.5)
WBC # FLD AUTO: 8.6 K/UL — SIGNIFICANT CHANGE UP (ref 3.8–10.5)

## 2017-03-31 PROCEDURE — 71010: CPT | Mod: 26

## 2017-03-31 PROCEDURE — 93010 ELECTROCARDIOGRAM REPORT: CPT

## 2017-03-31 PROCEDURE — 99285 EMERGENCY DEPT VISIT HI MDM: CPT

## 2017-03-31 RX ORDER — IPRATROPIUM/ALBUTEROL SULFATE 18-103MCG
3 AEROSOL WITH ADAPTER (GRAM) INHALATION ONCE
Qty: 0 | Refills: 0 | Status: COMPLETED | OUTPATIENT
Start: 2017-03-31 | End: 2017-03-31

## 2017-03-31 RX ORDER — CEFTRIAXONE 500 MG/1
1 INJECTION, POWDER, FOR SOLUTION INTRAMUSCULAR; INTRAVENOUS ONCE
Qty: 0 | Refills: 0 | Status: COMPLETED | OUTPATIENT
Start: 2017-03-31 | End: 2017-03-31

## 2017-03-31 RX ORDER — SODIUM CHLORIDE 9 MG/ML
1000 INJECTION INTRAMUSCULAR; INTRAVENOUS; SUBCUTANEOUS
Qty: 0 | Refills: 0 | Status: DISCONTINUED | OUTPATIENT
Start: 2017-03-31 | End: 2017-03-31

## 2017-03-31 RX ORDER — AZITHROMYCIN 500 MG/1
500 TABLET, FILM COATED ORAL ONCE
Qty: 0 | Refills: 0 | Status: COMPLETED | OUTPATIENT
Start: 2017-03-31 | End: 2017-03-31

## 2017-03-31 RX ADMIN — AZITHROMYCIN 255 MILLIGRAM(S): 500 TABLET, FILM COATED ORAL at 22:40

## 2017-03-31 RX ADMIN — Medication 0.5 MILLIGRAM(S): at 22:06

## 2017-03-31 RX ADMIN — CEFTRIAXONE 100 GRAM(S): 500 INJECTION, POWDER, FOR SOLUTION INTRAMUSCULAR; INTRAVENOUS at 22:05

## 2017-03-31 RX ADMIN — Medication 125 MILLIGRAM(S): at 22:05

## 2017-03-31 RX ADMIN — Medication 3 MILLILITER(S): at 22:25

## 2017-03-31 RX ADMIN — Medication 3 MILLILITER(S): at 22:06

## 2017-03-31 NOTE — PROGRESS NOTE ADULT - SUBJECTIVE AND OBJECTIVE BOX
Patient is a 72y old  Female who presents with a chief complaint of sob.  24 hour events: 72 F with PMHx of DM, Pulm HTN, ILD, who presents with sob. Pt started abx for dental abscess (amoxicillin day 9/10), Pt denies fever, N/V/D, CP, Chills. Pt placed on Bipap support for Increased wob, given IV steroids and started on empiric abx. ICU consulted for possible admission.      PAST MEDICAL & SURGICAL HISTORY:  Pulmonary hypertension  DM (diabetes mellitus): pulmonary htn  H/O abdominal hysterectomy: 2002      Review of Systems:  Constitutional: No fever, chills, fatigue  Neuro: No headache, numbness, weakness  Resp: + cough, non productive, +shortness of breath  CVS: No chest pain, palpitations, leg swelling  GI: No abdominal pain, nausea, vomiting, diarrhea   : No dysuria, frequency, incontinence  Skin: No itching, burning, rashes, or lesions   Msk: No joint pain or swelling  Psych: No depression, anxiety, mood swings    T(F): 98.2, Max: 98.2 (03-31 @ 21:18)  HR: 110 (96 - 110)  BP: 103/80 (103/80 - 103/80)  RR: 22 (22 - 22)  SpO2: 97% (97% - 100%)  Wt(kg): --        CAPILLARY BLOOD GLUCOSE      I&O's Summary      Physical Exam:     Gen:   Neuro: A&Ox3, non-focal  HEENT: NC/AT  Resp: Coarse bs bilateraly.   CVS: nl S1/S2, Tachy  Abd: soft, nt, nd, +bs  Ext: no edema, +pulses  Skin: well perfused, warm    Meds:    solumedrol  rocephin  azithro  duo nebs    Home meds:  Tyvaso  adcirca  nexium  zocor  pulmicort  proventil  glimiperide  cellcept  HCTZ  mucinex  prednisone                            13.2   8.6   )-----------( 288      ( 31 Mar 2017 22:07 )             41.6       31 Mar 2017 22:07    141    |  99     |  22     ----------------------------<  145    3.7     |  33     |  0.58     Ca    8.7        31 Mar 2017 22:07    TPro  6.6    /  Alb  3.4    /  TBili  0.3    /  DBili  x      /  AST  17     /  ALT  21     /  AlkPhos  84     31 Mar 2017 22:07    Lactate 1.3           03-31 @ 22:07      CARDIAC MARKERS ( 31 Mar 2017 22:07 )  .040 ng/mL / x     / 33 U/L / x     / 2.2 ng/mL      PT/INR - ( 31 Mar 2017 22:07 )   PT: 13.0 sec;   INR: 1.19 ratio         PTT - ( 31 Mar 2017 22:07 )  PTT:31.8 sec        Radiology: bilateral interstitial lung disease R>L. No change from previous exam.      CENTRAL LINE: no    MARI: no    A-LINE: no    GLOBAL ISSUE/BEST PRACTICE:  Analgesia: n/a  Sedation: n/a  HOB elevation: yes  Stress ulcer prophylaxis: PPI  VTE prophylaxis: yes  Glycemic control: HISS  Nutrition: NPO      CODE STATUS: ***  GOC discussion: Y  Critical care time spent (mins): ***  (Reviewing data, imaging, discussing with multidisciplinary team, non inclusive of procedures, discussing goals of care with patient/family)

## 2017-03-31 NOTE — ED ADULT NURSE REASSESSMENT NOTE - NS ED NURSE REASSESS COMMENT FT1
Pt was received A&OX4 and on BiPAP with RR:33. Dr. Gordon is aware. Pt denied CP, palpitation and dizziness. Pt was maintained on cardiac monitor. Continue to monitor pt.

## 2017-03-31 NOTE — ED ADULT NURSE NOTE - OBJECTIVE STATEMENT
received pt in bed #10a c/o SOB o2 sat 100% on 100%NRB pt states she wears NC @ home & feels more comfortable with mask. CM placed Dr Gordon in to see pt Will monitor received pt in bed #10a c/o SOB o2 sat 100% on 100%NRB pt states she wears NC @ home & feels more comfortable with mask. CM placed Dr Gordon in to see pt Will monitor 2245 Pt placed on Bipap per order

## 2017-03-31 NOTE — ED PROVIDER NOTE - OBJECTIVE STATEMENT
71 yo F p/w feeling SOB over past couple days. no chest pain. no fever/chills. No abd pain. no n/v/d. No neck / back pain. no agg/allev factors.  No recent travel ./ immob. No neck  / back pain. no other inj or co.  pt on augmentin for recent tooth infxn - improved.

## 2017-03-31 NOTE — ED PROVIDER NOTE - ENMT, MLM
Airway patent, Nasal mucosa clear. Mouth with normal mucosa. Throat has no vesicles, no oropharyngeal exudates and uvula is midline. MM Moist. Neck supple. no meningeal signs.

## 2017-04-01 DIAGNOSIS — Z41.8 ENCOUNTER FOR OTHER PROCEDURES FOR PURPOSES OTHER THAN REMEDYING HEALTH STATE: ICD-10-CM

## 2017-04-01 DIAGNOSIS — I27.2 OTHER SECONDARY PULMONARY HYPERTENSION: ICD-10-CM

## 2017-04-01 DIAGNOSIS — E11.9 TYPE 2 DIABETES MELLITUS WITHOUT COMPLICATIONS: ICD-10-CM

## 2017-04-01 DIAGNOSIS — J84.9 INTERSTITIAL PULMONARY DISEASE, UNSPECIFIED: ICD-10-CM

## 2017-04-01 DIAGNOSIS — E78.5 HYPERLIPIDEMIA, UNSPECIFIED: ICD-10-CM

## 2017-04-01 DIAGNOSIS — I10 ESSENTIAL (PRIMARY) HYPERTENSION: ICD-10-CM

## 2017-04-01 DIAGNOSIS — M40.209 UNSPECIFIED KYPHOSIS, SITE UNSPECIFIED: ICD-10-CM

## 2017-04-01 DIAGNOSIS — Z71.89 OTHER SPECIFIED COUNSELING: ICD-10-CM

## 2017-04-01 DIAGNOSIS — R06.00 DYSPNEA, UNSPECIFIED: ICD-10-CM

## 2017-04-01 DIAGNOSIS — J98.4 OTHER DISORDERS OF LUNG: ICD-10-CM

## 2017-04-01 LAB
ANION GAP SERPL CALC-SCNC: 8 MMOL/L — SIGNIFICANT CHANGE UP (ref 5–17)
BUN SERPL-MCNC: 20 MG/DL — SIGNIFICANT CHANGE UP (ref 7–23)
CALCIUM SERPL-MCNC: 8.4 MG/DL — LOW (ref 8.5–10.1)
CHLORIDE SERPL-SCNC: 101 MMOL/L — SIGNIFICANT CHANGE UP (ref 96–108)
CO2 SERPL-SCNC: 31 MMOL/L — SIGNIFICANT CHANGE UP (ref 22–31)
CREAT SERPL-MCNC: 0.49 MG/DL — LOW (ref 0.5–1.3)
CRP SERPL-MCNC: 1.65 MG/DL — HIGH (ref 0–0.4)
FLUBV RNA SPEC QL NAA+PROBE: DETECTED
GLUCOSE SERPL-MCNC: 189 MG/DL — HIGH (ref 70–99)
HBA1C BLD-MCNC: 6.4 % — HIGH (ref 4–5.6)
HCT VFR BLD CALC: 38.1 % — SIGNIFICANT CHANGE UP (ref 34.5–45)
HGB BLD-MCNC: 11.6 G/DL — SIGNIFICANT CHANGE UP (ref 11.5–15.5)
MCHC RBC-ENTMCNC: 30.1 PG — SIGNIFICANT CHANGE UP (ref 27–34)
MCHC RBC-ENTMCNC: 30.5 GM/DL — LOW (ref 32–36)
MCV RBC AUTO: 98.9 FL — SIGNIFICANT CHANGE UP (ref 80–100)
PLATELET # BLD AUTO: 238 K/UL — SIGNIFICANT CHANGE UP (ref 150–400)
POTASSIUM SERPL-MCNC: 4.2 MMOL/L — SIGNIFICANT CHANGE UP (ref 3.5–5.3)
POTASSIUM SERPL-SCNC: 4.2 MMOL/L — SIGNIFICANT CHANGE UP (ref 3.5–5.3)
RAPID RVP RESULT: DETECTED
RBC # BLD: 3.85 M/UL — SIGNIFICANT CHANGE UP (ref 3.8–5.2)
RBC # FLD: 13.6 % — SIGNIFICANT CHANGE UP (ref 10.3–14.5)
SODIUM SERPL-SCNC: 140 MMOL/L — SIGNIFICANT CHANGE UP (ref 135–145)
WBC # BLD: 4.2 K/UL — SIGNIFICANT CHANGE UP (ref 3.8–10.5)
WBC # FLD AUTO: 4.2 K/UL — SIGNIFICANT CHANGE UP (ref 3.8–10.5)

## 2017-04-01 PROCEDURE — 99223 1ST HOSP IP/OBS HIGH 75: CPT | Mod: AI,GC

## 2017-04-01 RX ORDER — AZITHROMYCIN 500 MG/1
500 TABLET, FILM COATED ORAL EVERY 24 HOURS
Qty: 0 | Refills: 0 | Status: DISCONTINUED | OUTPATIENT
Start: 2017-04-01 | End: 2017-04-03

## 2017-04-01 RX ORDER — TREPROSTINIL 48 UG/1
18 INHALANT ORAL
Qty: 0 | Refills: 0 | Status: DISCONTINUED | OUTPATIENT
Start: 2017-04-01 | End: 2017-04-03

## 2017-04-01 RX ORDER — DEXTROSE 50 % IN WATER 50 %
25 SYRINGE (ML) INTRAVENOUS ONCE
Qty: 0 | Refills: 0 | Status: DISCONTINUED | OUTPATIENT
Start: 2017-04-01 | End: 2017-04-03

## 2017-04-01 RX ORDER — INSULIN LISPRO 100/ML
VIAL (ML) SUBCUTANEOUS
Qty: 0 | Refills: 0 | Status: DISCONTINUED | OUTPATIENT
Start: 2017-04-01 | End: 2017-04-03

## 2017-04-01 RX ORDER — PANTOPRAZOLE SODIUM 20 MG/1
40 TABLET, DELAYED RELEASE ORAL
Qty: 0 | Refills: 0 | Status: DISCONTINUED | OUTPATIENT
Start: 2017-04-01 | End: 2017-04-03

## 2017-04-01 RX ORDER — IPRATROPIUM/ALBUTEROL SULFATE 18-103MCG
3 AEROSOL WITH ADAPTER (GRAM) INHALATION EVERY 6 HOURS
Qty: 0 | Refills: 0 | Status: DISCONTINUED | OUTPATIENT
Start: 2017-04-01 | End: 2017-04-01

## 2017-04-01 RX ORDER — TREPROSTINIL 48 UG/1
9 INHALANT ORAL
Qty: 0 | Refills: 0 | COMMUNITY

## 2017-04-01 RX ORDER — ENOXAPARIN SODIUM 100 MG/ML
40 INJECTION SUBCUTANEOUS EVERY 24 HOURS
Qty: 0 | Refills: 0 | Status: DISCONTINUED | OUTPATIENT
Start: 2017-04-01 | End: 2017-04-03

## 2017-04-01 RX ORDER — SIMVASTATIN 20 MG/1
20 TABLET, FILM COATED ORAL AT BEDTIME
Qty: 0 | Refills: 0 | Status: DISCONTINUED | OUTPATIENT
Start: 2017-04-01 | End: 2017-04-03

## 2017-04-01 RX ORDER — CEFTRIAXONE 500 MG/1
1 INJECTION, POWDER, FOR SOLUTION INTRAMUSCULAR; INTRAVENOUS EVERY 24 HOURS
Qty: 0 | Refills: 0 | Status: DISCONTINUED | OUTPATIENT
Start: 2017-04-01 | End: 2017-04-02

## 2017-04-01 RX ORDER — SPIRONOLACTONE 25 MG/1
25 TABLET, FILM COATED ORAL DAILY
Qty: 0 | Refills: 0 | Status: DISCONTINUED | OUTPATIENT
Start: 2017-04-01 | End: 2017-04-01

## 2017-04-01 RX ORDER — MYCOPHENOLATE MOFETIL 250 MG/1
1000 CAPSULE ORAL
Qty: 0 | Refills: 0 | Status: DISCONTINUED | OUTPATIENT
Start: 2017-04-01 | End: 2017-04-03

## 2017-04-01 RX ORDER — GLUCAGON INJECTION, SOLUTION 0.5 MG/.1ML
1 INJECTION, SOLUTION SUBCUTANEOUS ONCE
Qty: 0 | Refills: 0 | Status: DISCONTINUED | OUTPATIENT
Start: 2017-04-01 | End: 2017-04-03

## 2017-04-01 RX ORDER — DEXTROSE 50 % IN WATER 50 %
1 SYRINGE (ML) INTRAVENOUS ONCE
Qty: 0 | Refills: 0 | Status: DISCONTINUED | OUTPATIENT
Start: 2017-04-01 | End: 2017-04-03

## 2017-04-01 RX ORDER — SPIRONOLACTONE 25 MG/1
25 TABLET, FILM COATED ORAL DAILY
Qty: 0 | Refills: 0 | Status: DISCONTINUED | OUTPATIENT
Start: 2017-04-01 | End: 2017-04-03

## 2017-04-01 RX ORDER — MYCOPHENOLATE MOFETIL 250 MG/1
500 CAPSULE ORAL
Qty: 0 | Refills: 0 | Status: DISCONTINUED | OUTPATIENT
Start: 2017-04-01 | End: 2017-04-01

## 2017-04-01 RX ORDER — AMLODIPINE BESYLATE 2.5 MG/1
2.5 TABLET ORAL DAILY
Qty: 0 | Refills: 0 | Status: DISCONTINUED | OUTPATIENT
Start: 2017-04-01 | End: 2017-04-03

## 2017-04-01 RX ORDER — TADALAFIL 10 MG/1
40 TABLET, FILM COATED ORAL DAILY
Qty: 0 | Refills: 0 | Status: DISCONTINUED | OUTPATIENT
Start: 2017-04-01 | End: 2017-04-03

## 2017-04-01 RX ORDER — SODIUM CHLORIDE 9 MG/ML
1000 INJECTION, SOLUTION INTRAVENOUS
Qty: 0 | Refills: 0 | Status: DISCONTINUED | OUTPATIENT
Start: 2017-04-01 | End: 2017-04-03

## 2017-04-01 RX ORDER — ALBUTEROL 90 UG/1
2.5 AEROSOL, METERED ORAL EVERY 8 HOURS
Qty: 0 | Refills: 0 | Status: DISCONTINUED | OUTPATIENT
Start: 2017-04-01 | End: 2017-04-03

## 2017-04-01 RX ORDER — MYCOPHENOLATE MOFETIL 250 MG/1
2 CAPSULE ORAL
Qty: 0 | Refills: 0 | COMMUNITY

## 2017-04-01 RX ORDER — DEXTROSE 50 % IN WATER 50 %
12.5 SYRINGE (ML) INTRAVENOUS ONCE
Qty: 0 | Refills: 0 | Status: DISCONTINUED | OUTPATIENT
Start: 2017-04-01 | End: 2017-04-03

## 2017-04-01 RX ORDER — AMLODIPINE BESYLATE 2.5 MG/1
2.5 TABLET ORAL DAILY
Qty: 0 | Refills: 0 | Status: DISCONTINUED | OUTPATIENT
Start: 2017-04-01 | End: 2017-04-01

## 2017-04-01 RX ADMIN — Medication 1: at 08:35

## 2017-04-01 RX ADMIN — SPIRONOLACTONE 25 MILLIGRAM(S): 25 TABLET, FILM COATED ORAL at 06:33

## 2017-04-01 RX ADMIN — PANTOPRAZOLE SODIUM 40 MILLIGRAM(S): 20 TABLET, DELAYED RELEASE ORAL at 06:34

## 2017-04-01 RX ADMIN — Medication 75 MILLIGRAM(S): at 19:03

## 2017-04-01 RX ADMIN — MYCOPHENOLATE MOFETIL 500 MILLIGRAM(S): 250 CAPSULE ORAL at 06:34

## 2017-04-01 RX ADMIN — Medication 20 MILLIGRAM(S): at 20:05

## 2017-04-01 RX ADMIN — Medication 2: at 13:06

## 2017-04-01 RX ADMIN — MYCOPHENOLATE MOFETIL 1000 MILLIGRAM(S): 250 CAPSULE ORAL at 18:56

## 2017-04-01 RX ADMIN — TREPROSTINIL 18 MICROGRAM(S): 48 INHALANT ORAL at 20:08

## 2017-04-01 RX ADMIN — CEFTRIAXONE 100 GRAM(S): 500 INJECTION, POWDER, FOR SOLUTION INTRAMUSCULAR; INTRAVENOUS at 22:13

## 2017-04-01 RX ADMIN — ENOXAPARIN SODIUM 40 MILLIGRAM(S): 100 INJECTION SUBCUTANEOUS at 18:56

## 2017-04-01 RX ADMIN — TREPROSTINIL 18 MICROGRAM(S): 48 INHALANT ORAL at 15:30

## 2017-04-01 RX ADMIN — AZITHROMYCIN 255 MILLIGRAM(S): 500 TABLET, FILM COATED ORAL at 22:13

## 2017-04-01 RX ADMIN — ALBUTEROL 2.5 MILLIGRAM(S): 90 AEROSOL, METERED ORAL at 08:45

## 2017-04-01 RX ADMIN — ALBUTEROL 2.5 MILLIGRAM(S): 90 AEROSOL, METERED ORAL at 15:40

## 2017-04-01 RX ADMIN — ALBUTEROL 2.5 MILLIGRAM(S): 90 AEROSOL, METERED ORAL at 23:26

## 2017-04-01 RX ADMIN — SIMVASTATIN 20 MILLIGRAM(S): 20 TABLET, FILM COATED ORAL at 22:14

## 2017-04-01 NOTE — PATIENT PROFILE ADULT. - FALL HARM RISK
other/SOB on exertion SOB on exertion/other/coagulation(Bleeding disorder R/T clinical cond/anti-coags)

## 2017-04-01 NOTE — H&P ADULT - PROBLEM SELECTOR PLAN 2
-acute exacerbation, continue management at problem one. F/u pulmonary Dr. Rashaad ervin appreciated.

## 2017-04-01 NOTE — H&P ADULT - PROBLEM SELECTOR PLAN 1
-admit to Tele  -likely secondary to ILD exacerbation   -continue BIPAP titrate to FIO2 >92, Duoneb Q6, Zithromax 500mg Q24, Rocephin 1g Q24, Solu-medrol 60mg IV Q6  -f/u pulmonary Dr. Rashaad ervin appreciated.  -pt was seen by ICU in the ED but Pt refusing ICU admission due to her immunocompromised status and fear of developing "sepsis." Pt also does not want to be intubated should the need arise.

## 2017-04-01 NOTE — H&P ADULT - NSHPSOCIALHISTORY_GEN_ALL_CORE
Social History:    Marital Status:  (  x )    (   ) Single    (   )    (  )   Occupation:   Lives with: (  ) alone  (  ) children   ( x ) spouse   (  ) parents  (  ) other    Substance Use (street drugs): ( x ) never used  (  ) other:  Tobacco Usage:  (  x ) never smoked   (   ) former smoker   (   ) current smoker  (     ) pack year  (        ) last cigarette date  Alcohol Usage:  Sexual History:     Health Management     For female:   Last Mammo: few years    Last Pap: (     ) No  (    ) Yes  (    ) Normal   (    ) Date       Immunization Hx:   ( x ) flu shot                               (     ) date   ( x ) pneumonia shot               (     ) date  (  ) tetanus                               (     ) date     (     ) Advanced Directives: (     ) None    (      ) DNR    (     ) DNI    (     ) Health Care Proxy:

## 2017-04-01 NOTE — H&P ADULT - ASSESSMENT
72 F with PMHx of DM, Pulm HTN, ILD, who presents with sob admitted for acute respiratory distress secondary to ILD exacerbation.

## 2017-04-01 NOTE — PROGRESS NOTE ADULT - SUBJECTIVE AND OBJECTIVE BOX
72 F with PMHx of DM, Pulm HTN, ILD, who presents with sob admitted for acute respiratory distress secondary to ILD exacerbation, now found to be positive for influenza B.     INTERVAL HPI: Patient states that she still feels short of breath and has a cough productive of white to yellow phlegm. However, States that she feels a little improved from yesterday. Denies any other complaints.      REVIEW OF SYSTEMS:  CONSTITUTIONAL: No fever, weight loss, or fatigue  EYES: No eye pain, visual disturbances, or discharge  ENMT:  No difficulty hearing, tinnitus, vertigo; No sinus or throat pain  NECK: No pain or stiffness  BREASTS: No pain, masses, or nipple discharge  RESPIRATORY: + cough, + shortness of breath   CARDIOVASCULAR: No chest pain, palpitations, dizziness, or leg swelling  GASTROINTESTINAL: No abdominal or epigastric pain. No nausea, vomiting, or hematemesis; No diarrhea or constipation. No melena or hematochezia.  GENITOURINARY: No dysuria, frequency, hematuria, or incontinence  NEUROLOGICAL: No headaches, memory loss, loss of strength, numbness, or tremors  SKIN: No itching, burning, rashes, or lesions   MUSCULOSKELETAL: No joint pain or swelling; No muscle, back, or extremity pain  PSYCHIATRIC: + anxiety-intermittently     PHYSICAL EXAM:  T(C): 36.5, Max: 37.1 (04-01 @ 00:56)  HR: 96 (91 - 112)  BP: 114/75 (103/80 - 115/76)  RR: 23 (19 - 33)  SpO2: 96% (94% - 100%)      GENERAL: NAD, well-groomed, well-developed  HEAD:  Atraumatic, Normocephalic  EYES: EOMI, PERRLA, conjunctiva and sclera clear  ENMT: Moist mucous membranes, Good dentition, No lesions  NECK: Supple, No JVD, Normal thyroid  NERVOUS SYSTEM:  Alert & Oriented X3  CHEST/LUNG: b/l ronchi, more prominent in the right upper lobe   HEART: Regular rate and rhythm; No murmurs, rubs, or gallops  ABDOMEN: Soft, Nontender, Nondistended; Bowel sounds present  EXTREMITIES:  2+ Peripheral Pulses, No clubbing, cyanosis, or edema  LYMPH: No lymphadenopathy noted  SKIN: No rashes or lesions    LABS:                        11.6   4.2   )-----------( 238      ( 01 Apr 2017 07:25 )             38.1     01 Apr 2017 07:25    140    |  101    |  20     ----------------------------<  189    4.2     |  31     |  0.49     Ca    8.4        01 Apr 2017 07:25    TPro  6.6    /  Alb  3.4    /  TBili  0.3    /  DBili  x      /  AST  17     /  ALT  21     /  AlkPhos  84     31 Mar 2017 22:07    PT/INR - ( 31 Mar 2017 22:07 )   PT: 13.0 sec;   INR: 1.19 ratio         PTT - ( 31 Mar 2017 22:07 )  PTT:31.8 sec    CAPILLARY BLOOD GLUCOSE    ABG - ( 31 Mar 2017 23:39 )  pH: 7.38  /  pCO2: 53    /  pO2: 117   / HCO3: 29    / Base Excess: 5.6   /  SaO2: 98        + RVP for influenza B       Diet: CCD with evening snack     RADIOLOGY & ADDITIONAL TESTS:    Imaging Personally Reviewed:  [Y ] YES     Consultant(s) Notes Reviewed:  Yes     MEDICATIONS  (STANDING):  enoxaparin Injectable 40milliGRAM(s) SubCutaneous every 24 hours  insulin lispro (HumaLOG) corrective regimen sliding scale  SubCutaneous three times a day before meals  dextrose 5%. 1000milliLiter(s) IV Continuous <Continuous>  dextrose 50% Injectable 12.5Gram(s) IV Push once  dextrose 50% Injectable 25Gram(s) IV Push once  dextrose 50% Injectable 25Gram(s) IV Push once  tadalafil 40milliGRAM(s) Oral daily  treprostinil Inhalation 18MICROGram(s) Inhalation four times a day  pantoprazole    Tablet 40milliGRAM(s) Oral before breakfast  cefTRIAXone   IVPB 1Gram(s) IV Intermittent every 24 hours  azithromycin  IVPB 500milliGRAM(s) IV Intermittent every 24 hours  simvastatin 20milliGRAM(s) Oral at bedtime  mycophenolate mofetil 500milliGRAM(s) Oral two times a day  predniSONE   Tablet 20milliGRAM(s) Oral two times a day  ALBUTerol    0.083% 2.5milliGRAM(s) Nebulizer every 8 hours  oseltamivir 75milliGRAM(s) Oral two times a day  spironolactone 25milliGRAM(s) Oral daily  amLODIPine   Tablet 2.5milliGRAM(s) Oral daily    MEDICATIONS  (PRN):  dextrose Gel 1Dose(s) Oral once PRN Blood Glucose LESS THAN 70 milliGRAM(s)/deciliter  glucagon  Injectable 1milliGRAM(s) IntraMuscular once PRN Glucose LESS THAN 70 milligrams/deciliter          Disposition:    DVT Prophylaxis:  Subcu Heparin [  ]     LMWH [ ]     Coumadin [ ]    Xaeralto [ ]    Eliquis [ ]   Venodyne pumps [ ]    Discussed with Patient [ ]     Family [ ]          [ ]   RN[ ]      [ ]    Advance Directives:      Palliative Care:    Care Discussed with Consultants/Other Providers [ ] YES  [ ] NO    [  ] Teaching Attending Addendum: [  ] Present with Resident      I saw and evaluated the patient. Discussed with Resident,  _____________________ and agree with the resident's findings and plan as documented in the resident's note, with the following revision made as necessary.     Attending Comments:          Time spent: 72 F with PMHx of DM, Pulm HTN, ILD, who presents with sob admitted for acute respiratory distress secondary to ILD exacerbation, now found to be positive for influenza B.     INTERVAL HPI: Patient states that she still feels short of breath and has a cough productive of white to yellow phlegm. However, States that she feels a little improved from yesterday. Denies any other complaints.      REVIEW OF SYSTEMS:  CONSTITUTIONAL: No fever, weight loss, or fatigue  EYES: No eye pain, visual disturbances, or discharge  ENMT:  No difficulty hearing, tinnitus, vertigo; No sinus or throat pain  NECK: No pain or stiffness  BREASTS: No pain, masses, or nipple discharge  RESPIRATORY: + cough, + shortness of breath   CARDIOVASCULAR: No chest pain, palpitations, dizziness, or leg swelling  GASTROINTESTINAL: No abdominal or epigastric pain. No nausea, vomiting, or hematemesis; No diarrhea or constipation. No melena or hematochezia.  GENITOURINARY: No dysuria, frequency, hematuria, or incontinence  NEUROLOGICAL: No headaches, memory loss, loss of strength, numbness, or tremors  SKIN: No itching, burning, rashes, or lesions   MUSCULOSKELETAL: No joint pain or swelling; No muscle, back, or extremity pain  PSYCHIATRIC: + anxiety-intermittently     PHYSICAL EXAM:  T(C): 36.5, Max: 37.1 (04-01 @ 00:56)  HR: 96 (91 - 112)  BP: 114/75 (103/80 - 115/76)  RR: 23 (19 - 33)  SpO2: 96% (94% - 100%)      GENERAL: NAD, well-groomed, well-developed  HEAD:  Atraumatic, Normocephalic  EYES: EOMI, PERRLA, conjunctiva and sclera clear  ENMT: Moist mucous membranes, Good dentition, No lesions  NECK: Supple, No JVD, Normal thyroid  NERVOUS SYSTEM:  Alert & Oriented X3  CHEST/LUNG: b/l ronchi, more prominent in the right upper lobe   HEART: Regular rate and rhythm; No murmurs, rubs, or gallops  ABDOMEN: Soft, Nontender, Nondistended; Bowel sounds present  EXTREMITIES:  2+ Peripheral Pulses, No clubbing, cyanosis, or edema  LYMPH: No lymphadenopathy noted  SKIN: No rashes or lesions    LABS:                        11.6   4.2   )-----------( 238      ( 01 Apr 2017 07:25 )             38.1     01 Apr 2017 07:25    140    |  101    |  20     ----------------------------<  189    4.2     |  31     |  0.49     Ca    8.4        01 Apr 2017 07:25    TPro  6.6    /  Alb  3.4    /  TBili  0.3    /  DBili  x      /  AST  17     /  ALT  21     /  AlkPhos  84     31 Mar 2017 22:07    PT/INR - ( 31 Mar 2017 22:07 )   PT: 13.0 sec;   INR: 1.19 ratio         PTT - ( 31 Mar 2017 22:07 )  PTT:31.8 sec    CAPILLARY BLOOD GLUCOSE    ABG - ( 31 Mar 2017 23:39 )  pH: 7.38  /  pCO2: 53    /  pO2: 117   / HCO3: 29    / Base Excess: 5.6   /  SaO2: 98        + RVP for influenza B       Diet: CCD with evening snack     RADIOLOGY & ADDITIONAL TESTS:    Imaging Personally Reviewed:  [Y ] YES     Consultant(s) Notes Reviewed:  Yes     MEDICATIONS  (STANDING):  enoxaparin Injectable 40milliGRAM(s) SubCutaneous every 24 hours  insulin lispro (HumaLOG) corrective regimen sliding scale  SubCutaneous three times a day before meals  dextrose 5%. 1000milliLiter(s) IV Continuous <Continuous>  dextrose 50% Injectable 12.5Gram(s) IV Push once  dextrose 50% Injectable 25Gram(s) IV Push once  dextrose 50% Injectable 25Gram(s) IV Push once  tadalafil 40milliGRAM(s) Oral daily  treprostinil Inhalation 18MICROGram(s) Inhalation four times a day  pantoprazole    Tablet 40milliGRAM(s) Oral before breakfast  cefTRIAXone   IVPB 1Gram(s) IV Intermittent every 24 hours  azithromycin  IVPB 500milliGRAM(s) IV Intermittent every 24 hours  simvastatin 20milliGRAM(s) Oral at bedtime  mycophenolate mofetil 500milliGRAM(s) Oral two times a day  predniSONE   Tablet 20milliGRAM(s) Oral two times a day  ALBUTerol    0.083% 2.5milliGRAM(s) Nebulizer every 8 hours  oseltamivir 75milliGRAM(s) Oral two times a day  spironolactone 25milliGRAM(s) Oral daily  amLODIPine   Tablet 2.5milliGRAM(s) Oral daily    MEDICATIONS  (PRN):  dextrose Gel 1Dose(s) Oral once PRN Blood Glucose LESS THAN 70 milliGRAM(s)/deciliter  glucagon  Injectable 1milliGRAM(s) IntraMuscular once PRN Glucose LESS THAN 70 milligrams/deciliter            Advance Directives: DNR/DNI. Case discussed in detail with patient and she clearly states that those are her wishes.     Case discussed with patient.

## 2017-04-01 NOTE — H&P ADULT - HISTORY OF PRESENT ILLNESS
72 F with PMHx of DM, Pulm HTN, ILD, who presents with sob. Pt started amoxicillin  for dental abscess day 9/10, Patient states she had been feeling sob for the past 3days that is not relieved by Pulmicort o Proventil. Patient also admits to productive cought with associated chest congestion. Today sob progressively got worse so pt come to ED.  Pt closely followed by PMD/Pulmonologist @ [place]. Pt denies fever, chills,  N/V/D, CP, palpitations.     In the ED, Patient was found to be tachypeic, tachycardic,  Pt placed on Bipap support for Increased work of breathing, given IV steroids,  Zithromax and Rocephin x 1 dose, duoneb x2 doses, ativan. pertinent lab, proBNP 4156, ABG 7.38/ 72 F with PMHx of DM, Pulm HTN, ILD, who presents with sob. Pt started amoxicillin  for dental abscess day 9/10, Patient states she had been feeling sob for the past 3days that is not relieved by Pulmicort o Proventil. Patient also admits to productive cought with associated chest congestion. Today sob progressively got worse so pt come to ED.  Pt closely followed by PMD/Pulmonologist @ [place]. Pt denies fever, chills,  N/V/D, CP, palpitations.     In the ED, Patient was found to be tachypeic, tachycardic,  Pt placed on Bipap support for Increased work of breathing, given IV steroids,  Zithromax and Rocephin x 1 dose, duoneb x2 doses, ativan. pertinent lab, proBNP 4156, ABG 7.38/53/33/98. Pt woas evaluated by ICU but refusing ICU admission due to her immunocompromised status and fear of developing "sepsis." Pt also does not want to be intubated should the need arise.

## 2017-04-01 NOTE — CONSULT NOTE ADULT - SUBJECTIVE AND OBJECTIVE BOX
PULMONARY CONSULT NOTE      DAVID BAR  MRN-[id number]    Patient is a 72y old  Female who presents with a chief complaint of SOB (01 Apr 2017 00:11)  well known to me  has severe Pulm HTN and ILD  on sophisticated regimen for Pulm HTN and ILD  follow up with Pulm Team in [facility] in [facility]  now with SOB and positive sick contacts - son in law    HISTORY OF PRESENT ILLNESS:    MEDICATIONS  (STANDING):  enoxaparin Injectable 40milliGRAM(s) SubCutaneous every 24 hours  insulin lispro (HumaLOG) corrective regimen sliding scale  SubCutaneous three times a day before meals  dextrose 5%. 1000milliLiter(s) IV Continuous <Continuous>  dextrose 50% Injectable 12.5Gram(s) IV Push once  dextrose 50% Injectable 25Gram(s) IV Push once  dextrose 50% Injectable 25Gram(s) IV Push once  tadalafil 40milliGRAM(s) Oral daily  treprostinil Inhalation 18MICROGram(s) Inhalation four times a day  pantoprazole    Tablet 40milliGRAM(s) Oral before breakfast  cefTRIAXone   IVPB 1Gram(s) IV Intermittent every 24 hours  azithromycin  IVPB 500milliGRAM(s) IV Intermittent every 24 hours  spironolactone 25milliGRAM(s) Oral daily  simvastatin 20milliGRAM(s) Oral at bedtime  amLODIPine   Tablet 2.5milliGRAM(s) Oral daily  mycophenolate mofetil 500milliGRAM(s) Oral two times a day  predniSONE   Tablet 20milliGRAM(s) Oral two times a day  ALBUTerol    0.083% 2.5milliGRAM(s) Nebulizer every 8 hours  oseltamivir 75milliGRAM(s) Oral two times a day      MEDICATIONS  (PRN):  dextrose Gel 1Dose(s) Oral once PRN Blood Glucose LESS THAN 70 milliGRAM(s)/deciliter  glucagon  Injectable 1milliGRAM(s) IntraMuscular once PRN Glucose LESS THAN 70 milligrams/deciliter      Allergies    No Known Allergies    Intolerances        PAST MEDICAL & SURGICAL HISTORY:  Pulmonary hypertension  DM (diabetes mellitus): pulmonary htn  H/O abdominal hysterectomy: 2002      FAMILY HISTORY:  Family history of myocardial infarction (Father)  No pertinent family history in first degree relatives      SOCIAL HISTORY  Smoking History:     REVIEW OF SYSTEMS:    REVIEW OF SYSTEMS      General:	    Skin/Breast:  	  Ophthalmologic:  	  ENMT:	    Respiratory and Thorax: GAUTHIER SOB  	  Cardiovascular:	    Gastrointestinal:	    Genitourinary:	    Musculoskeletal:	    Neurological:	    Psychiatric:	    Hematology/Lymphatics:	    Endocrine:	    Allergic/Immunologic:	    Vital Signs Last 24 Hrs  T(C): 36.3, Max: 37.1 (04-01 @ 00:56)  T(F): 97.4, Max: 98.8 (04-01 @ 00:56)  HR: 97 (95 - 112)  BP: 106/73 (103/80 - 115/76)  BP(mean): --  RR: 20 (19 - 33)  SpO2: 100% (97% - 100%)    PHYSICAL EXAMINATION:  PHYSICAL EXAM:      Constitutional:    Eyes:    ENMT:    Neck:    Breasts:    Back:    Respiratory: dec BS    Cardiovascular:    Gastrointestinal:    Genitourinary:    Rectal:    Extremities: no gross edema    Vascular:    Neurological:    Skin:    Lymph Nodes:    Musculoskeletal: severe Kyphosis    Psychiatric:          LABS:                        13.2   8.6   )-----------( 288      ( 31 Mar 2017 22:07 )             41.6     31 Mar 2017 22:07    141    |  99     |  22     ----------------------------<  145    3.7     |  33     |  0.58     Ca    8.7        31 Mar 2017 22:07    TPro  6.6    /  Alb  3.4    /  TBili  0.3    /  DBili  x      /  AST  17     /  ALT  21     /  AlkPhos  84     31 Mar 2017 22:07    PT/INR - ( 31 Mar 2017 22:07 )   PT: 13.0 sec;   INR: 1.19 ratio         PTT - ( 31 Mar 2017 22:07 )  PTT:31.8 sec    ABG - ( 31 Mar 2017 23:39 )  pH: 7.38  /  pCO2: 53    /  pO2: 117   / HCO3: 29    / Base Excess: 5.6   /  SaO2: 98                CARDIAC MARKERS ( 31 Mar 2017 22:07 )  .040 ng/mL / x     / 33 U/L / x     / 2.2 ng/mL        Serum Pro-Brain Natriuretic Peptide: 4156 pg/mL (03-31 @ 22:07)    Lactate, Blood: 1.3 mmol/L (03-31 @ 22:07)        MICROBIOLOGY:    RADIOLOGY & ADDITIONAL STUDIES:

## 2017-04-01 NOTE — H&P ADULT - NEGATIVE ENMT SYMPTOMS
no nasal discharge/no hearing difficulty/no ear pain/no nasal congestion/no sinus symptoms/no vertigo/no tinnitus

## 2017-04-01 NOTE — ED ADULT NURSE REASSESSMENT NOTE - NS ED NURSE REASSESS COMMENT FT1
Writer called to give pt's report at 0126 and found out that pt is now on droplet isolation. As a result, writer was unable to complete pt's hand off report as pt needs to wait for his RSV result prior to her transfer. Charge nurse Kari was notified. Pt was maintained on her BiPAP. Pt is presently asymptomatic and no s/s of distress noted. Continue to monitor pt.

## 2017-04-02 DIAGNOSIS — J10.1 INFLUENZA DUE TO OTHER IDENTIFIED INFLUENZA VIRUS WITH OTHER RESPIRATORY MANIFESTATIONS: ICD-10-CM

## 2017-04-02 DIAGNOSIS — R06.02 SHORTNESS OF BREATH: ICD-10-CM

## 2017-04-02 DIAGNOSIS — I49.9 CARDIAC ARRHYTHMIA, UNSPECIFIED: ICD-10-CM

## 2017-04-02 LAB
ANION GAP SERPL CALC-SCNC: 5 MMOL/L — SIGNIFICANT CHANGE UP (ref 5–17)
BUN SERPL-MCNC: 17 MG/DL — SIGNIFICANT CHANGE UP (ref 7–23)
CALCIUM SERPL-MCNC: 8.6 MG/DL — SIGNIFICANT CHANGE UP (ref 8.5–10.1)
CHLORIDE SERPL-SCNC: 103 MMOL/L — SIGNIFICANT CHANGE UP (ref 96–108)
CO2 SERPL-SCNC: 35 MMOL/L — HIGH (ref 22–31)
CREAT SERPL-MCNC: 0.54 MG/DL — SIGNIFICANT CHANGE UP (ref 0.5–1.3)
GLUCOSE SERPL-MCNC: 161 MG/DL — HIGH (ref 70–99)
HCT VFR BLD CALC: 37.1 % — SIGNIFICANT CHANGE UP (ref 34.5–45)
HGB BLD-MCNC: 11.8 G/DL — SIGNIFICANT CHANGE UP (ref 11.5–15.5)
MAGNESIUM SERPL-MCNC: 2.1 MG/DL — SIGNIFICANT CHANGE UP (ref 1.8–2.4)
MCHC RBC-ENTMCNC: 30.8 PG — SIGNIFICANT CHANGE UP (ref 27–34)
MCHC RBC-ENTMCNC: 31.9 GM/DL — LOW (ref 32–36)
MCV RBC AUTO: 96.6 FL — SIGNIFICANT CHANGE UP (ref 80–100)
PLATELET # BLD AUTO: 264 K/UL — SIGNIFICANT CHANGE UP (ref 150–400)
POTASSIUM SERPL-MCNC: 4.8 MMOL/L — SIGNIFICANT CHANGE UP (ref 3.5–5.3)
POTASSIUM SERPL-SCNC: 4.8 MMOL/L — SIGNIFICANT CHANGE UP (ref 3.5–5.3)
RBC # BLD: 3.84 M/UL — SIGNIFICANT CHANGE UP (ref 3.8–5.2)
RBC # FLD: 13.5 % — SIGNIFICANT CHANGE UP (ref 10.3–14.5)
SODIUM SERPL-SCNC: 143 MMOL/L — SIGNIFICANT CHANGE UP (ref 135–145)
WBC # BLD: 5.7 K/UL — SIGNIFICANT CHANGE UP (ref 3.8–10.5)
WBC # FLD AUTO: 5.7 K/UL — SIGNIFICANT CHANGE UP (ref 3.8–10.5)

## 2017-04-02 PROCEDURE — 99233 SBSQ HOSP IP/OBS HIGH 50: CPT

## 2017-04-02 PROCEDURE — 99223 1ST HOSP IP/OBS HIGH 75: CPT

## 2017-04-02 RX ORDER — ALPRAZOLAM 0.25 MG
0.25 TABLET ORAL EVERY 12 HOURS
Qty: 0 | Refills: 0 | Status: DISCONTINUED | OUTPATIENT
Start: 2017-04-02 | End: 2017-04-03

## 2017-04-02 RX ADMIN — AZITHROMYCIN 255 MILLIGRAM(S): 500 TABLET, FILM COATED ORAL at 22:04

## 2017-04-02 RX ADMIN — TREPROSTINIL 18 MICROGRAM(S): 48 INHALANT ORAL at 08:30

## 2017-04-02 RX ADMIN — ALBUTEROL 2.5 MILLIGRAM(S): 90 AEROSOL, METERED ORAL at 07:46

## 2017-04-02 RX ADMIN — TREPROSTINIL 18 MICROGRAM(S): 48 INHALANT ORAL at 12:37

## 2017-04-02 RX ADMIN — Medication 75 MILLIGRAM(S): at 18:46

## 2017-04-02 RX ADMIN — Medication 75 MILLIGRAM(S): at 06:35

## 2017-04-02 RX ADMIN — TREPROSTINIL 18 MICROGRAM(S): 48 INHALANT ORAL at 16:54

## 2017-04-02 RX ADMIN — Medication 3: at 14:14

## 2017-04-02 RX ADMIN — Medication 2: at 18:44

## 2017-04-02 RX ADMIN — Medication 20 MILLIGRAM(S): at 06:36

## 2017-04-02 RX ADMIN — MYCOPHENOLATE MOFETIL 1000 MILLIGRAM(S): 250 CAPSULE ORAL at 18:45

## 2017-04-02 RX ADMIN — ALBUTEROL 2.5 MILLIGRAM(S): 90 AEROSOL, METERED ORAL at 14:54

## 2017-04-02 RX ADMIN — MYCOPHENOLATE MOFETIL 1000 MILLIGRAM(S): 250 CAPSULE ORAL at 06:35

## 2017-04-02 RX ADMIN — SPIRONOLACTONE 25 MILLIGRAM(S): 25 TABLET, FILM COATED ORAL at 06:35

## 2017-04-02 RX ADMIN — ALBUTEROL 2.5 MILLIGRAM(S): 90 AEROSOL, METERED ORAL at 20:59

## 2017-04-02 RX ADMIN — ENOXAPARIN SODIUM 40 MILLIGRAM(S): 100 INJECTION SUBCUTANEOUS at 18:46

## 2017-04-02 RX ADMIN — PANTOPRAZOLE SODIUM 40 MILLIGRAM(S): 20 TABLET, DELAYED RELEASE ORAL at 06:35

## 2017-04-02 RX ADMIN — TADALAFIL 40 MILLIGRAM(S): 10 TABLET, FILM COATED ORAL at 09:48

## 2017-04-02 RX ADMIN — AMLODIPINE BESYLATE 2.5 MILLIGRAM(S): 2.5 TABLET ORAL at 06:35

## 2017-04-02 RX ADMIN — Medication 20 MILLIGRAM(S): at 18:46

## 2017-04-02 RX ADMIN — SIMVASTATIN 20 MILLIGRAM(S): 20 TABLET, FILM COATED ORAL at 22:04

## 2017-04-02 NOTE — CONSULT NOTE ADULT - SUBJECTIVE AND OBJECTIVE BOX
[facility]         Dilan Casanova, Meri, [place], Joseph Chan        [phone] (office)    CHIEF COMPLAINT: Patient is a 72y old  Female who presents with a chief complaint of shortness of breath (01 Apr 2017 06:35)     HPI:  72 F with PMHx of DM, Pulm HTN, ILD.  She has a history of a normal ejection fraction based on echocardiography in June of 2016. She presented to the hospital yesterday with shortness of breath.  She was initially tachypneic and tachycardic.  She was placed on BiPAP. She has been managed with antibiotics, steroids and bronchodilators.  She has been found to have influenza B. She has been monitored on telemetry, and has been found to have intermittent episodes of wide-complex tachycardia, up to 3 beats in duration. Cardiology consultation is now being obtained    The patient has long-standing shortness of breath, attributable to her severe underlying lung disease. She has not been experiencing chest discomfort. She denies orthopnea, PND and lower extremity edema. She denies palpitations and syncope. She does report that on a recent 6 minute walk test recommended by her pulmonologist, she was found to have some sort of arrhythmia/premature beats. She was told at that time, that this was benign.        PAST MEDICAL & SURGICAL HISTORY:  Pulmonary hypertension  DM (diabetes mellitus): pulmonary htn  H/O abdominal hysterectomy: 2002      SOCIAL HISTORY: No active tobacco, alcohol or illicit drug use    FAMILY HISTORY: FAMILY HISTORY:  Family history of myocardial infarction (Father)  No pertinent family history in first degree relatives      MEDICATIONS  (STANDING):  enoxaparin Injectable 40milliGRAM(s) SubCutaneous every 24 hours  insulin lispro (HumaLOG) corrective regimen sliding scale  SubCutaneous three times a day before meals  dextrose 5%. 1000milliLiter(s) IV Continuous <Continuous>  dextrose 50% Injectable 12.5Gram(s) IV Push once  dextrose 50% Injectable 25Gram(s) IV Push once  dextrose 50% Injectable 25Gram(s) IV Push once  tadalafil 40milliGRAM(s) Oral daily  treprostinil Inhalation 18MICROGram(s) Inhalation four times a day  pantoprazole    Tablet 40milliGRAM(s) Oral before breakfast  azithromycin  IVPB 500milliGRAM(s) IV Intermittent every 24 hours  simvastatin 20milliGRAM(s) Oral at bedtime  predniSONE   Tablet 20milliGRAM(s) Oral two times a day  ALBUTerol    0.083% 2.5milliGRAM(s) Nebulizer every 8 hours  oseltamivir 75milliGRAM(s) Oral two times a day  spironolactone 25milliGRAM(s) Oral daily  amLODIPine   Tablet 2.5milliGRAM(s) Oral daily  mycophenolate mofetil 1000milliGRAM(s) Oral two times a day    MEDICATIONS  (PRN):  dextrose Gel 1Dose(s) Oral once PRN Blood Glucose LESS THAN 70 milliGRAM(s)/deciliter  glucagon  Injectable 1milliGRAM(s) IntraMuscular once PRN Glucose LESS THAN 70 milligrams/deciliter      Allergies    No Known Allergies    Intolerances        REVIEW OF SYSTEMS: Is negative for eye, ENT, GI, , allergic, dermatologic, musculoskeletal and neurologic, except as described above.    VITAL SIGNS:   Vital Signs Last 24 Hrs  T(C): 36.5, Max: 36.7 (04-02 @ 04:50)  T(F): 97.7, Max: 98 (04-02 @ 04:50)  HR: 96 (63 - 103)  BP: 117/74 (93/58 - 124/81)  BP(mean): --  RR: 23 (20 - 23)  SpO2: 97% (91% - 98%)    I&O's Summary  I & Os for 24h ending 02 Apr 2017 07:00  =============================================  IN: 300 ml / OUT: 0 ml / NET: 300 ml    I & Os for current day (as of 02 Apr 2017 12:03)  =============================================  IN: 0 ml / OUT: 200 ml / NET: -200 ml      PHYSICAL EXAM:    Constitutional: Thin female in mild respiratory distress, awake and alert, well-developed  Eyes:  EOMI,  Pupils round, No oral cyanosis, conjunctivae clear, anicteric.  Pulmonary: Mildly labored, She has crackles bilaterally, consistent with her interstitial lung disease.  Cardiovascular: PMI not palpable non-displaced, regular S1 and S2, Tachycardic rate no murmurs, rubs, gallops or clicks  Gastrointestinal: Bowel Sounds present, soft, nontender.   Lymph: No peripheral edema. No cervical lymphadenopathy.  Neurological: Alert, strength and sensitivity are grossly intact  Skin: No obvious lesions/rashes.   Psych:  Mood & affect appropriate .    LABS: All Labs Reviewed:                        11.8   5.7   )-----------( 264      ( 02 Apr 2017 06:46 )             37.1                         11.6   4.2   )-----------( 238      ( 01 Apr 2017 07:25 )             38.1                         13.2   8.6   )-----------( 288      ( 31 Mar 2017 22:07 )             41.6     02 Apr 2017 06:46    143    |  103    |  17     ----------------------------<  161    4.8     |  35     |  0.54   01 Apr 2017 07:25    140    |  101    |  20     ----------------------------<  189    4.2     |  31     |  0.49   31 Mar 2017 22:07    141    |  99     |  22     ----------------------------<  145    3.7     |  33     |  0.58     Ca    8.6        02 Apr 2017 06:46  Ca    8.4        01 Apr 2017 07:25  Ca    8.7        31 Mar 2017 22:07  Phos  4.1       02 Apr 2017 06:46  Mg     2.1       02 Apr 2017 06:46    TPro  6.6    /  Alb  3.4    /  TBili  0.3    /  DBili  x      /  AST  17     /  ALT  21     /  AlkPhos  84     31 Mar 2017 22:07    PT/INR - ( 31 Mar 2017 22:07 )   PT: 13.0 sec;   INR: 1.19 ratio         PTT - ( 31 Mar 2017 22:07 )  PTT:31.8 sec  CARDIAC MARKERS ( 31 Mar 2017 22:07 )  .040 ng/mL / x     / 33 U/L / x     / 2.2 ng/mL      Blood Culture: Organism --  Gram Stain Blood -- Gram Stain --  Specimen Source .Blood Blood-Peripheral  Culture-Blood --      03-31 @ 22:07  Pro Bnp [id number]        RADIOLOGY:    EKG:      echo:     EXAM:  ECHO TTE W/O CON COMP W/DOPPLR           PROCEDURE DATE:  06/03/2016      INTERPRETATION:  Ordering Physician: KIERA MARTINEZ    Indication: Dyspnea    Technician: FRANCISCO JAVIER    Study Quality: Fair   A complete echocardiographic study was performed utilizing standard   protocol including spectral and color Doppler in all echocardiographic   windows.    Height: 167 cm  Weight: 81 kg  BSA: 1.91m2  Blood Pressure: 106/60    MEASUREMENTS  IVS: 1.2cm  PWT: 1.2cm  LA: 4.0cm  AO: 3.5cm  LVIDd: 4.7cm  LVIDs: 2.8cm  LVOT:    cm    LVEF: 60%  RVSP: 70mm/Hg  RA Pressure: 10mm/Hg  IVC:    cm    FINDINGS  Left Ventricle: Mild concentric left ventricular hypertrophy with normal   global left ventricular systolic function  Right Ventricle: Mildly dilated  Left Atrium: Mildly dilated  Right Atrium: Mildly dilated  Mitral Valve: Mitral annular calcification  Aortic Valve: Aortic sclerosis  Tricuspid Valve: Severe pulmonary hypertension with moderate tricuspid   regurgitation  Pulmonic Valve:      Diastolic Function: Doppler evidence suggestive of diastolic dysfunction  Pericardium/Pleura: No significant effusions      CONCLUSIONS:  Technically limited study  1. Mild concentric left ventricular hypertrophy with normal global left   ventricular systolic function  2. Mitral annular calcification  3. Aortic sclerosis  4. Severe pulmonary hypertension with moderate tricuspid regurgitation                   RANDY CRAVEN M.D., ATTENDING CARDIOLOGIST  This document has been electronically signed.Kavon  3 2016  9:43P

## 2017-04-02 NOTE — PROGRESS NOTE ADULT - SUBJECTIVE AND OBJECTIVE BOX
Date/Time Patient Seen:  		  Referring MD:   Data Reviewed	       Patient is a 72y old  Female who presents with a chief complaint of shortness of breath (01 Apr 2017 06:35)    in bed  very anxious  did ok overnight off BIPAP  will dc BIPAP as pt is not requiring vent support anymore  labs reviewed  meds reviewed  VS reviewed      Subjective/HPI       Medication list         MEDICATIONS  (STANDING):  enoxaparin Injectable 40milliGRAM(s) SubCutaneous every 24 hours  insulin lispro (HumaLOG) corrective regimen sliding scale  SubCutaneous three times a day before meals  dextrose 5%. 1000milliLiter(s) IV Continuous <Continuous>  dextrose 50% Injectable 12.5Gram(s) IV Push once  dextrose 50% Injectable 25Gram(s) IV Push once  dextrose 50% Injectable 25Gram(s) IV Push once  tadalafil 40milliGRAM(s) Oral daily  treprostinil Inhalation 18MICROGram(s) Inhalation four times a day  pantoprazole    Tablet 40milliGRAM(s) Oral before breakfast  azithromycin  IVPB 500milliGRAM(s) IV Intermittent every 24 hours  simvastatin 20milliGRAM(s) Oral at bedtime  predniSONE   Tablet 20milliGRAM(s) Oral two times a day  ALBUTerol    0.083% 2.5milliGRAM(s) Nebulizer every 8 hours  oseltamivir 75milliGRAM(s) Oral two times a day  spironolactone 25milliGRAM(s) Oral daily  amLODIPine   Tablet 2.5milliGRAM(s) Oral daily  mycophenolate mofetil 1000milliGRAM(s) Oral two times a day    MEDICATIONS  (PRN):  dextrose Gel 1Dose(s) Oral once PRN Blood Glucose LESS THAN 70 milliGRAM(s)/deciliter  glucagon  Injectable 1milliGRAM(s) IntraMuscular once PRN Glucose LESS THAN 70 milligrams/deciliter         Vitals log        ICU Vital Signs Last 24 Hrs  T(C): 36.7, Max: 36.7 (04-02 @ 04:50)  T(F): 98, Max: 98 (04-02 @ 04:50)  HR: 93 (90 - 103)  BP: 93/60 (93/58 - 124/81)  BP(mean): --  ABP: --  ABP(mean): --  RR: 20 (20 - 23)  SpO2: 98% (91% - 98%)           Input and Output:  I&O's Detail    I & Os for current day (as of 02 Apr 2017 06:49)  =============================================  IN:    Solution: 250 ml    Solution: 50 ml    Total IN: 300 ml  ---------------------------------------------  OUT:    Total OUT: 0 ml  ---------------------------------------------  Total NET: 300 ml      Lab Data                        11.6   4.2   )-----------( 238      ( 01 Apr 2017 07:25 )             38.1     01 Apr 2017 07:25    140    |  101    |  20     ----------------------------<  189    4.2     |  31     |  0.49     Ca    8.4        01 Apr 2017 07:25    TPro  6.6    /  Alb  3.4    /  TBili  0.3    /  DBili  x      /  AST  17     /  ALT  21     /  AlkPhos  84     31 Mar 2017 22:07    ABG - ( 31 Mar 2017 23:39 )  pH: 7.38  /  pCO2: 53    /  pO2: 117   / HCO3: 29    / Base Excess: 5.6   /  SaO2: 98                CARDIAC MARKERS ( 31 Mar 2017 22:07 )  .040 ng/mL / x     / 33 U/L / x     / 2.2 ng/mL        Review of Systems	      Objective       Resp, kyphosis, dec BS, no wheezing,     CV       GI    Extremities       Neuro       Skin         Pertinent Lab findings & Imaging      Francie:  NO   Adequate UO     I&O's Detail    I & Os for current day (as of 02 Apr 2017 06:49)  =============================================  IN:    Solution: 250 ml    Solution: 50 ml    Total IN: 300 ml  ---------------------------------------------  OUT:    Total OUT: 0 ml  ---------------------------------------------  Total NET: 300 ml           Discussed with:     Cultures:	        Radiology

## 2017-04-02 NOTE — PROGRESS NOTE ADULT - PROBLEM SELECTOR PLAN 4
cont cellcept
hold oral medications  insulin sliding scale, monitor blood glucose achs  CCD diet
hold oral medications  insulin sliding scale, monitor blood glucose achs  CCD diet

## 2017-04-02 NOTE — PROGRESS NOTE ADULT - PROBLEM SELECTOR PLAN 3
cont home regimen  monitor volume status, may need Lasix PRN  on Aldactone
continue home medications  O2 supplementation (patient uses 6L NC at home)  follow up with pulmonologist 
continue home medications  O2 supplementation (patient uses 6L NC at home), bipap as needed  follow up with pulmonologist

## 2017-04-02 NOTE — CONSULT NOTE ADULT - ASSESSMENT
72-year-old woman with a history of COPD, interstitial lung disease, normal ejection fraction, presenting with influenza B with associated respiratory insufficiency, and brief wide-complex tachycardia.    She is without acute ischemia.  She is without volume overload.  In the setting of a preserved ejection fraction, the prognosis of very brief wide-complex tachycardia would be benign. Certainly in the context of her severe underlying lung disease, brief wide-complex tachycardia would not be greatest concern.  I would continue all her medications for pulmonary hypertension.  Continue DVT prophylaxis.  Continue antivirals.  Continue steroids and inhaled bronchodilators.

## 2017-04-02 NOTE — PROGRESS NOTE ADULT - PROBLEM SELECTOR PLAN 5
lovenox 40mg sc daily- DVT prophylaxis   protonix 40mg daiy- GI prophylaxis  pt eval pending
multifactorial  on Tamiflu for FLU B  dc Rocephin, keep ZIthro 5 days  no evidence of Severe PNA, Zithromax is more of a prophylactic ABX in the setting of pt with high risk for complications in post FLU course  dc plan for Monday, tomorrow  discussed with pt in great detail
lovenox 40mg sc daily- DVT prophylaxis   protonix 40mg daiy- GI prophylaxis  pt eval pending

## 2017-04-02 NOTE — PROGRESS NOTE ADULT - PROBLEM SELECTOR PLAN 6
continue norvasc and spironolactone with hold parameters  monitor blood pressure
continue norvasc and spironolactone with hold parameters  monitor blood pressrue

## 2017-04-02 NOTE — PROGRESS NOTE ADULT - PROBLEM SELECTOR PLAN 10
-had episode of 3 beats of vtach, pvc overnight but was asymptomatic- cardiology  consulted  -currently NSR on tele, mild tachycardia  -monitor lytes

## 2017-04-03 ENCOUNTER — TRANSCRIPTION ENCOUNTER (OUTPATIENT)
Age: 72
End: 2017-04-03

## 2017-04-03 VITALS
SYSTOLIC BLOOD PRESSURE: 114 MMHG | OXYGEN SATURATION: 92 % | HEART RATE: 87 BPM | DIASTOLIC BLOOD PRESSURE: 87 MMHG | RESPIRATION RATE: 19 BRPM | TEMPERATURE: 98 F

## 2017-04-03 LAB
ANION GAP SERPL CALC-SCNC: 6 MMOL/L — SIGNIFICANT CHANGE UP (ref 5–17)
BUN SERPL-MCNC: 16 MG/DL — SIGNIFICANT CHANGE UP (ref 7–23)
CALCIUM SERPL-MCNC: 8.6 MG/DL — SIGNIFICANT CHANGE UP (ref 8.5–10.1)
CHLORIDE SERPL-SCNC: 101 MMOL/L — SIGNIFICANT CHANGE UP (ref 96–108)
CO2 SERPL-SCNC: 35 MMOL/L — HIGH (ref 22–31)
CREAT SERPL-MCNC: 0.5 MG/DL — SIGNIFICANT CHANGE UP (ref 0.5–1.3)
GLUCOSE SERPL-MCNC: 135 MG/DL — HIGH (ref 70–99)
HCT VFR BLD CALC: 38.5 % — SIGNIFICANT CHANGE UP (ref 34.5–45)
HGB BLD-MCNC: 11.8 G/DL — SIGNIFICANT CHANGE UP (ref 11.5–15.5)
MAGNESIUM SERPL-MCNC: 2 MG/DL — SIGNIFICANT CHANGE UP (ref 1.8–2.4)
MCHC RBC-ENTMCNC: 30.1 PG — SIGNIFICANT CHANGE UP (ref 27–34)
MCHC RBC-ENTMCNC: 30.6 GM/DL — LOW (ref 32–36)
MCV RBC AUTO: 98.2 FL — SIGNIFICANT CHANGE UP (ref 80–100)
PHOSPHATE SERPL-MCNC: 3.9 MG/DL — SIGNIFICANT CHANGE UP (ref 2.5–4.5)
PLATELET # BLD AUTO: 243 K/UL — SIGNIFICANT CHANGE UP (ref 150–400)
POTASSIUM SERPL-MCNC: 4.2 MMOL/L — SIGNIFICANT CHANGE UP (ref 3.5–5.3)
POTASSIUM SERPL-SCNC: 4.2 MMOL/L — SIGNIFICANT CHANGE UP (ref 3.5–5.3)
RBC # BLD: 3.92 M/UL — SIGNIFICANT CHANGE UP (ref 3.8–5.2)
RBC # FLD: 13.9 % — SIGNIFICANT CHANGE UP (ref 10.3–14.5)
SODIUM SERPL-SCNC: 142 MMOL/L — SIGNIFICANT CHANGE UP (ref 135–145)
WBC # BLD: 4.8 K/UL — SIGNIFICANT CHANGE UP (ref 3.8–10.5)
WBC # FLD AUTO: 4.8 K/UL — SIGNIFICANT CHANGE UP (ref 3.8–10.5)

## 2017-04-03 PROCEDURE — 87798 DETECT AGENT NOS DNA AMP: CPT

## 2017-04-03 PROCEDURE — 83735 ASSAY OF MAGNESIUM: CPT

## 2017-04-03 PROCEDURE — 84145 PROCALCITONIN (PCT): CPT

## 2017-04-03 PROCEDURE — 96372 THER/PROPH/DIAG INJ SC/IM: CPT | Mod: 59

## 2017-04-03 PROCEDURE — 85027 COMPLETE CBC AUTOMATED: CPT

## 2017-04-03 PROCEDURE — 96375 TX/PRO/DX INJ NEW DRUG ADDON: CPT

## 2017-04-03 PROCEDURE — 99285 EMERGENCY DEPT VISIT HI MDM: CPT | Mod: 25

## 2017-04-03 PROCEDURE — 87486 CHLMYD PNEUM DNA AMP PROBE: CPT

## 2017-04-03 PROCEDURE — 96376 TX/PRO/DX INJ SAME DRUG ADON: CPT

## 2017-04-03 PROCEDURE — 83880 ASSAY OF NATRIURETIC PEPTIDE: CPT

## 2017-04-03 PROCEDURE — 83605 ASSAY OF LACTIC ACID: CPT

## 2017-04-03 PROCEDURE — 85730 THROMBOPLASTIN TIME PARTIAL: CPT

## 2017-04-03 PROCEDURE — 96365 THER/PROPH/DIAG IV INF INIT: CPT

## 2017-04-03 PROCEDURE — 87040 BLOOD CULTURE FOR BACTERIA: CPT

## 2017-04-03 PROCEDURE — 97162 PT EVAL MOD COMPLEX 30 MIN: CPT

## 2017-04-03 PROCEDURE — 80048 BASIC METABOLIC PNL TOTAL CA: CPT

## 2017-04-03 PROCEDURE — 82550 ASSAY OF CK (CPK): CPT

## 2017-04-03 PROCEDURE — 87633 RESP VIRUS 12-25 TARGETS: CPT

## 2017-04-03 PROCEDURE — 84100 ASSAY OF PHOSPHORUS: CPT

## 2017-04-03 PROCEDURE — 84484 ASSAY OF TROPONIN QUANT: CPT

## 2017-04-03 PROCEDURE — 83036 HEMOGLOBIN GLYCOSYLATED A1C: CPT

## 2017-04-03 PROCEDURE — 85610 PROTHROMBIN TIME: CPT

## 2017-04-03 PROCEDURE — 82553 CREATINE MB FRACTION: CPT

## 2017-04-03 PROCEDURE — 86140 C-REACTIVE PROTEIN: CPT

## 2017-04-03 PROCEDURE — 80053 COMPREHEN METABOLIC PANEL: CPT

## 2017-04-03 PROCEDURE — 36600 WITHDRAWAL OF ARTERIAL BLOOD: CPT

## 2017-04-03 PROCEDURE — 99232 SBSQ HOSP IP/OBS MODERATE 35: CPT

## 2017-04-03 PROCEDURE — 82803 BLOOD GASES ANY COMBINATION: CPT

## 2017-04-03 PROCEDURE — 94640 AIRWAY INHALATION TREATMENT: CPT

## 2017-04-03 PROCEDURE — 99239 HOSP IP/OBS DSCHRG MGMT >30: CPT

## 2017-04-03 PROCEDURE — 87581 M.PNEUMON DNA AMP PROBE: CPT

## 2017-04-03 PROCEDURE — 94760 N-INVAS EAR/PLS OXIMETRY 1: CPT

## 2017-04-03 PROCEDURE — 71045 X-RAY EXAM CHEST 1 VIEW: CPT

## 2017-04-03 PROCEDURE — 93005 ELECTROCARDIOGRAM TRACING: CPT

## 2017-04-03 PROCEDURE — 94660 CPAP INITIATION&MGMT: CPT

## 2017-04-03 RX ORDER — AZITHROMYCIN 500 MG/1
250 TABLET, FILM COATED ORAL DAILY
Qty: 0 | Refills: 0 | Status: DISCONTINUED | OUTPATIENT
Start: 2017-04-03 | End: 2017-04-03

## 2017-04-03 RX ORDER — ALPRAZOLAM 0.25 MG
1 TABLET ORAL
Qty: 10 | Refills: 0 | OUTPATIENT
Start: 2017-04-03 | End: 2017-04-08

## 2017-04-03 RX ORDER — AZITHROMYCIN 500 MG/1
1 TABLET, FILM COATED ORAL
Qty: 3 | Refills: 0 | OUTPATIENT
Start: 2017-04-03 | End: 2017-04-06

## 2017-04-03 RX ADMIN — TREPROSTINIL 18 MICROGRAM(S): 48 INHALANT ORAL at 11:51

## 2017-04-03 RX ADMIN — Medication 20 MILLIGRAM(S): at 06:21

## 2017-04-03 RX ADMIN — MYCOPHENOLATE MOFETIL 1000 MILLIGRAM(S): 250 CAPSULE ORAL at 17:57

## 2017-04-03 RX ADMIN — MYCOPHENOLATE MOFETIL 1000 MILLIGRAM(S): 250 CAPSULE ORAL at 06:21

## 2017-04-03 RX ADMIN — Medication 20 MILLIGRAM(S): at 17:57

## 2017-04-03 RX ADMIN — Medication 75 MILLIGRAM(S): at 06:21

## 2017-04-03 RX ADMIN — SPIRONOLACTONE 25 MILLIGRAM(S): 25 TABLET, FILM COATED ORAL at 06:21

## 2017-04-03 RX ADMIN — PANTOPRAZOLE SODIUM 40 MILLIGRAM(S): 20 TABLET, DELAYED RELEASE ORAL at 06:21

## 2017-04-03 RX ADMIN — ALBUTEROL 2.5 MILLIGRAM(S): 90 AEROSOL, METERED ORAL at 15:48

## 2017-04-03 RX ADMIN — ALBUTEROL 2.5 MILLIGRAM(S): 90 AEROSOL, METERED ORAL at 08:10

## 2017-04-03 RX ADMIN — TADALAFIL 40 MILLIGRAM(S): 10 TABLET, FILM COATED ORAL at 08:23

## 2017-04-03 RX ADMIN — TREPROSTINIL 18 MICROGRAM(S): 48 INHALANT ORAL at 08:51

## 2017-04-03 RX ADMIN — Medication: at 11:50

## 2017-04-03 NOTE — PROGRESS NOTE ADULT - SUBJECTIVE AND OBJECTIVE BOX
Follow up: SOB, WCT    HPI:  72 F with PMHx of DM, Pulm HTN, ILD, who presents with sob. She was diagnosed with influenza and started on a antibiotics and antiviral therapy. She is feeling better and is scheduled for discharge.    PAST MEDICAL & SURGICAL HISTORY:  Pulmonary hypertension  DM (diabetes mellitus): pulmonary htn  H/O abdominal hysterectomy: 2002      MEDICATIONS  (STANDING):  enoxaparin Injectable 40milliGRAM(s) SubCutaneous every 24 hours  insulin lispro (HumaLOG) corrective regimen sliding scale  SubCutaneous three times a day before meals  dextrose 5%. 1000milliLiter(s) IV Continuous <Continuous>  dextrose 50% Injectable 12.5Gram(s) IV Push once  dextrose 50% Injectable 25Gram(s) IV Push once  dextrose 50% Injectable 25Gram(s) IV Push once  tadalafil 40milliGRAM(s) Oral daily  treprostinil Inhalation 18MICROGram(s) Inhalation four times a day  pantoprazole    Tablet 40milliGRAM(s) Oral before breakfast  simvastatin 20milliGRAM(s) Oral at bedtime  predniSONE   Tablet 20milliGRAM(s) Oral two times a day  ALBUTerol    0.083% 2.5milliGRAM(s) Nebulizer every 8 hours  oseltamivir 75milliGRAM(s) Oral two times a day  spironolactone 25milliGRAM(s) Oral daily  amLODIPine   Tablet 2.5milliGRAM(s) Oral daily  mycophenolate mofetil 1000milliGRAM(s) Oral two times a day  azithromycin   Tablet 250milliGRAM(s) Oral daily    MEDICATIONS  (PRN):  dextrose Gel 1Dose(s) Oral once PRN Blood Glucose LESS THAN 70 milliGRAM(s)/deciliter  glucagon  Injectable 1milliGRAM(s) IntraMuscular once PRN Glucose LESS THAN 70 milligrams/deciliter  freetext medication  - 1Tablet(s) Oral every 12 hours PRN cough  ALPRAZolam 0.25milliGRAM(s) Oral every 12 hours PRN anxiety    Vital Signs Last 24 Hrs  T(C): 36.9, Max: 36.9 (04-03 @ 16:28)  T(F): 98.4, Max: 98.4 (04-03 @ 16:28)  HR: 87 (74 - 105)  BP: 114/87 (93/61 - 136/72)  BP(mean): --  RR: 19 (19 - 25)  SpO2: 92% (89% - 98%)    I&O's Summary    I & Os for current day (as of 03 Apr 2017 16:53)  =============================================  IN: 390 ml / OUT: 800 ml / NET: -410 ml      PHYSICAL EXAM:    Constitutional: NAD, awake and alert, well-developed  Eyes:  EOMI,  Pupils round, no lesions  ENMT: no exudate or erythema  Pulmonary: Non-labored, breath sounds are clear bilaterally, decreased BS No wheezing, rales or rhonchi  Cardiovascular: PMI not palpable non-displaced Regular S1 and S2, no murmurs, rubs, gallops or clicks  Gastrointestinal: Bowel Sounds present, soft, nontender.   Lymph: No peripheral edema. No cervical lymphadenopathy.  Neurological: Alert, no focal deficits  Skin: No rashes. Changes of chronic venous stasis. No cyanosis.  Psych:  Mood & affect appropriate Confused.                                11.8   4.8   )-----------( 243      ( 03 Apr 2017 06:20 )             38.5     CBC Full  -  ( 03 Apr 2017 06:20 )  WBC Count : 4.8 K/uL  Hemoglobin : 11.8 g/dL  Hematocrit : 38.5 %  Platelet Count - Automated : 243 K/uL  Mean Cell Volume : 98.2 fl  Mean Cell Hemoglobin : 30.1 pg  Mean Cell Hemoglobin Concentration : 30.6 gm/dL  Auto Neutrophil # : x  Auto Lymphocyte # : x  Auto Monocyte # : x  Auto Eosinophil # : x  Auto Basophil # : x  Auto Neutrophil % : x  Auto Lymphocyte % : x  Auto Monocyte % : x  Auto Eosinophil % : x  Auto Basophil % : x    03 Apr 2017 06:20    142    |  101    |  16     ----------------------------<  135    4.2     |  35     |  0.50     Ca    8.6        03 Apr 2017 06:20  Phos  3.9       03 Apr 2017 06:20  Mg     2.0       03 Apr 2017 06:20

## 2017-04-03 NOTE — DISCHARGE NOTE ADULT - PROVIDER TOKENS
FREE:[LAST:[Karl],FIRST:[Nancy],PHONE:[phone]],FAX:[phone]:[[facility] [place] [address]]]

## 2017-04-03 NOTE — PROGRESS NOTE ADULT - SUBJECTIVE AND OBJECTIVE BOX
Date/Time Patient Seen:  		  Referring MD:   Data Reviewed	       Patient is a 72y old  Female who presents with a chief complaint of shortness of breath (01 Apr 2017 06:35)  pt in bed  seen and examined  vs and meds reviewed  very anxious  stable on o2 support  on isol precs      Subjective/HPI       Medication list         MEDICATIONS  (STANDING):  enoxaparin Injectable 40milliGRAM(s) SubCutaneous every 24 hours  insulin lispro (HumaLOG) corrective regimen sliding scale  SubCutaneous three times a day before meals  dextrose 5%. 1000milliLiter(s) IV Continuous <Continuous>  dextrose 50% Injectable 12.5Gram(s) IV Push once  dextrose 50% Injectable 25Gram(s) IV Push once  dextrose 50% Injectable 25Gram(s) IV Push once  tadalafil 40milliGRAM(s) Oral daily  treprostinil Inhalation 18MICROGram(s) Inhalation four times a day  pantoprazole    Tablet 40milliGRAM(s) Oral before breakfast  azithromycin  IVPB 500milliGRAM(s) IV Intermittent every 24 hours  simvastatin 20milliGRAM(s) Oral at bedtime  predniSONE   Tablet 20milliGRAM(s) Oral two times a day  ALBUTerol    0.083% 2.5milliGRAM(s) Nebulizer every 8 hours  oseltamivir 75milliGRAM(s) Oral two times a day  spironolactone 25milliGRAM(s) Oral daily  amLODIPine   Tablet 2.5milliGRAM(s) Oral daily  mycophenolate mofetil 1000milliGRAM(s) Oral two times a day    MEDICATIONS  (PRN):  dextrose Gel 1Dose(s) Oral once PRN Blood Glucose LESS THAN 70 milliGRAM(s)/deciliter  glucagon  Injectable 1milliGRAM(s) IntraMuscular once PRN Glucose LESS THAN 70 milligrams/deciliter  freetext medication  - 1Tablet(s) Oral every 12 hours PRN cough  ALPRAZolam 0.25milliGRAM(s) Oral every 12 hours PRN anxiety         Vitals log        ICU Vital Signs Last 24 Hrs  T(C): 36.7, Max: 36.7 (04-03 @ 05:30)  T(F): 98, Max: 98 (04-03 @ 05:30)  HR: 74 (63 - 100)  BP: 93/61 (93/61 - 136/72)  BP(mean): --  ABP: --  ABP(mean): --  RR: 23 (22 - 25)  SpO2: 92% (92% - 98%)           Input and Output:  I&O's Detail    I & Os for current day (as of 03 Apr 2017 07:30)  =============================================  IN:    Solution: 250 ml    Oral Fluid: 20 ml    Total IN: 270 ml  ---------------------------------------------  OUT:    Voided: 600 ml    Total OUT: 600 ml  ---------------------------------------------  Total NET: -330 ml      Lab Data                        11.8   4.8   )-----------( 243      ( 03 Apr 2017 06:20 )             38.5     03 Apr 2017 06:20    142    |  101    |  16     ----------------------------<  135    4.2     |  35     |  0.50     Ca    8.6        03 Apr 2017 06:20  Phos  3.9       03 Apr 2017 06:20  Mg     2.0       03 Apr 2017 06:20              Review of Systems	      Objective       Resp, kyphosis, dec BS, no wheezing    CV       GI    Extremities       Neuro       Skin         Pertinent Lab findings & Imaging      Marmolejo:  NO   Adequate UO     I&O's Detail    I & Os for current day (as of 03 Apr 2017 07:30)  =============================================  IN:    Solution: 250 ml    Oral Fluid: 20 ml    Total IN: 270 ml  ---------------------------------------------  OUT:    Voided: 600 ml    Total OUT: 600 ml  ---------------------------------------------  Total NET: -330 ml           Discussed with:     Cultures:	        Radiology

## 2017-04-03 NOTE — DISCHARGE NOTE ADULT - PATIENT PORTAL LINK FT
“You can access the [employer] Patient Portal, offered by [facility], by registering with the following website: [email or web address]”

## 2017-04-03 NOTE — PHYSICAL THERAPY INITIAL EVALUATION ADULT - IMPAIRMENTS FOUND, PT EVAL
gait, locomotion, and balance posture/aerobic capacity/endurance/muscle strength/joint integrity and mobility/gait, locomotion, and balance

## 2017-04-03 NOTE — PROGRESS NOTE ADULT - PROBLEM SELECTOR PROBLEM 2
ILD (interstitial lung disease)
Restrictive lung disease due to kyphoscoliosis
Shortness of breath
ILD (interstitial lung disease)

## 2017-04-03 NOTE — DISCHARGE NOTE ADULT - CARE PLAN
Principal Discharge DX:	Acute respiratory distress  Goal:	improved  Instructions for follow-up, activity and diet:	continue tamiflu and azithromycin  Secondary Diagnosis:	Influenza B  Goal:	improved  Instructions for follow-up, activity and diet:	continue tamiflu  Secondary Diagnosis:	ILD (interstitial lung disease)  Goal:	stabl  Instructions for follow-up, activity and diet:	continue prednisone, mucinex prn, inhalers  Follow with [facility] pulmonologist Dr Barajas and Dr. Chaudhary  Secondary Diagnosis:	Pulmonary hypertension  Instructions for follow-up, activity and diet:	Continue adcirca, tyvaso  Follow with [facility] pulmonologist Dr Barajas and Dr. Chaudhary  Secondary Diagnosis:	DM (diabetes mellitus)  Goal:	stable  Instructions for follow-up, activity and diet:	continue home medications  Secondary Diagnosis:	Hypertension  Instructions for follow-up, activity and diet:	continue home medications  Secondary Diagnosis:	Hyperlipidemia  Instructions for follow-up, activity and diet:	continue home medications Principal Discharge DX:	Acute respiratory distress  Goal:	improved  Instructions for follow-up, activity and diet:	continue tamiflu and azithromycin  Secondary Diagnosis:	Influenza B  Goal:	improved  Instructions for follow-up, activity and diet:	continue tamiflu  Secondary Diagnosis:	ILD (interstitial lung disease)  Goal:	stabl  Instructions for follow-up, activity and diet:	continue prednisone, mucinex prn, inhalers  Follow with [facility] pulmonologist Dr Barajas and Dr. Chaudhary  Secondary Diagnosis:	Pulmonary hypertension  Instructions for follow-up, activity and diet:	Continue adcirca, tyvaso  Follow with [facility] pulmonologist Dr Barajas and Dr. Chaudhary  Secondary Diagnosis:	DM (diabetes mellitus)  Goal:	stable  Instructions for follow-up, activity and diet:	continue home medications  Secondary Diagnosis:	Hypertension  Instructions for follow-up, activity and diet:	continue home medications  Secondary Diagnosis:	Hyperlipidemia  Instructions for follow-up, activity and diet:	continue home medications Principal Discharge DX:	Acute respiratory distress  Goal:	improved  Instructions for follow-up, activity and diet:	continue tamiflu and azithromycin  Secondary Diagnosis:	Influenza B  Goal:	improved  Instructions for follow-up, activity and diet:	continue tamiflu  Secondary Diagnosis:	ILD (interstitial lung disease)  Goal:	stabl  Instructions for follow-up, activity and diet:	continue prednisone, mucinex prn, inhalers  Follow with [facility] pulmonologist Dr Barajas and Dr. Chaudhary  Secondary Diagnosis:	Pulmonary hypertension  Instructions for follow-up, activity and diet:	Continue adcirca, tyvaso  Follow with [facility] pulmonologist Dr Barajas and Dr. Chaudhary  Secondary Diagnosis:	DM (diabetes mellitus)  Goal:	stable  Instructions for follow-up, activity and diet:	continue home medications  Secondary Diagnosis:	Hypertension  Instructions for follow-up, activity and diet:	continue home medications  Secondary Diagnosis:	Hyperlipidemia  Instructions for follow-up, activity and diet:	continue home medications

## 2017-04-03 NOTE — DISCHARGE NOTE ADULT - PLAN OF CARE
improved continue tamiflu and azithromycin continue tamiflu niccibl continue prednisone, mucinex prn, inhalers  Follow with [facility] pulmonologist Dr Barajas and Dr. Chaudhary Continue adcirca, tyvaso  Follow with [facility] pulmonologist Dr Barajas and Dr. Chaudhary stable continue home medications

## 2017-04-03 NOTE — PROGRESS NOTE ADULT - PROBLEM SELECTOR PLAN 1
likely 2/2 to flu resulting in ILD exacerbation  continue po steroids, nebulizers, tamiflu, o2 supplementation (patient uses 6L NC at home), dc bipap as patient did not require bipap overnight  -procalcitonin <0.05, dc rocephin, continue zithromax as prophylaxis due to high risk of complications per pulmonology  -elevated BNP- ? fluid overload, chest xray similar to previous admission. will do strict I/O , daily weight   Pulmonology  on board  continue home medications -cellcept, adcirca and remodulin    monitor closely on telemetry- NSR on tele  (sinus tach-low 100s), had episode of 3 beats of vtach, pvc overnight but was asymptomatic- cardiology  consulted
restr lung disease  incentive rupesh  out of bed  asst with adl
tamiflu 5 days
likely 2/2 to flu resulting in ILD exacerbation  continue po steroids, nebulizers, tamiflu, o2 supplementation (patient uses 6L NC at home), and bipap as needed   -on antibiotics for possible community acquired pneumonia, will follow up with procalcitinin  -elevated BNP- ? fluid overload, chest xray similar to previous admission. will do strict I/O , daily weight   Pulmonology  on board  continue home medications -cellcept, adcirca and remodulin    monitor closely on telemetry- NSR on tele (mild sinus tach-low 100s)

## 2017-04-03 NOTE — DISCHARGE NOTE ADULT - MEDICATION SUMMARY - MEDICATIONS TO TAKE
I will START or STAY ON the medications listed below when I get home from the hospital:    Pulmicort Flexhaler 180 mcg/inh inhalation powder  -- 1 puff(s) inhaled 2 times a day  -- Indication: For ILD (interstitial lung disease)    predniSONE 20 mg oral tablet  -- 1 tab(s) by mouth 2 times a day (until your appointment with pulmonologist this Friday)  -- Indication: For ILD (interstitial lung disease)    Adcirca 20 mg oral tablet  -- 2 tab(s) by mouth once a day  -- Indication: For Pulmonary hypertension    Tyvaso 0.6 mg/mL inhalation solution  -- 3 puff(s) inhaled 4 times a day upto 9puff  -- Indication: For Pulmonary hypertension    Aldactone 25 mg oral tablet  -- 1 tab(s) by mouth once a day  -- Indication: For Restrictive lung disease due to kyphoscoliosis    glimepiride 2 mg oral tablet  -- 1 tab(s) by mouth once a day  -- Indication: For DM (diabetes mellitus)    simvastatin 20 mg oral tablet  -- 1 tab(s) by mouth once a day (at bedtime)  -- Indication: For Hyperlipidemia    oseltamivir 75 mg oral capsule  -- 1 cap(s) by mouth 2 times a day  -- Indication: For Influenza B    ALPRAZolam 0.25 mg oral tablet  -- 1 tab(s) by mouth every 12 hours, As needed, anxiety MDD:2 tabs  -- Indication: For Shortness of breath    Proventil HFA 90 mcg/inh inhalation aerosol  -- 2 puff(s) inhaled 4 times a day, As Needed  -- Indication: For ILD (interstitial lung disease)    amLODIPine 2.5 mg oral tablet  -- 1 tab(s) by mouth once a day  -- Indication: For Hypertension    CellCept 500 mg oral tablet  -- 1 tab(s) by mouth 2 times a day  -- Indication: For Restrictive lung disease due to kyphoscoliosis    azithromycin 250 mg oral tablet  -- 1 tab(s) by mouth once a day  -- Indication: For Atypical Pneumonia    NexIUM 40 mg oral delayed release capsule  -- 1 cap(s) by mouth once a day  -- Indication: For Need for prophylactic measure    Mucinex DM  -- 1 tab(s) by mouth every 12 hours, As Needed cough  -- Indication: For Shortness of breath

## 2017-04-03 NOTE — PHYSICAL THERAPY INITIAL EVALUATION ADULT - RANGE OF MOTION EXAMINATION, REHAB EVAL
bilateral upper extremity ROM was WFL (within functional limits)/bilateral lower extremity ROM was WFL (within functional limits) deficits as listed below/Arthritic changes t/o. Kyphotic  trunk/bilateral lower extremity ROM was WFL (within functional limits)/bilateral upper extremity ROM was WFL (within functional limits)

## 2017-04-03 NOTE — DISCHARGE NOTE ADULT - HOSPITAL COURSE
72 F with PMHx of DM, Pulm HTN, ILD, who presents with sob. Pt started amoxicillin  for dental abscess day 9/10, Patient states she had been feeling sob for the past 3days that is not relieved by Pulmicort o Proventil. Patient also admits to productive cought with associated chest congestion. Today sob progressively got worse so pt come to ED.  Pt closely followed by PMD/Pulmonologist @ [place]. Pt denies fever, chills,  N/V/D, CP, palpitations.     In the ED, Patient was found to be tachypeic, tachycardic,  Pt placed on Bipap support for Increased work of breathing, given IV steroids,  Zithromax and Rocephin x 1 dose, duoneb x2 doses, ativan. pertinent lab, proBNP 4156, ABG 7.38/53/33/98. Pt woas evaluated by ICU but refusing ICU admission due to her immunocompromised status and fear of developing "sepsis." Pt also does not want to be intubated should the need arise.    Patient was admitted to telemetry floor for acute respiratory distress secondary to ILD exacerbation found to be positive for influenza B . Patient had no evidence of Severe PNA, Zithromax continued more for prophylactic in the setting of pt with high risk for complications in post FLU course. Patient had a brief episode of wide-complex tachycardia, evaluated by Cardiology no concern for ischemia, likely in setting of her severe pumonary disease.  Patient remained hemodynamically stable, can be discharged to home with home care services and outpatient follow up at [facility] as scheduled.    ICU Vital Signs Last 24 Hrs  T(C): 36.7, Max: 36.7 (04-03 @ 05:30)  T(F): 98, Max: 98 (04-03 @ 05:30)  HR: 98 (74 - 102)  BP: 116/72 (93/61 - 136/72)  BP(mean): --  ABP: --  ABP(mean): --  RR: 20 (20 - 25)  SpO2: 92% (89% - 98%)    General: WN/WD NAD  Neurology: A&Ox3, nonfocal, BLACKMAN x 4  Respiratory: Clear, no crackles  CV: RRR, S1S2  Abdominal: Soft, NT, ND +BS, Last BM  Extremities: No edema, + peripheral pulses

## 2017-04-03 NOTE — PHYSICAL THERAPY INITIAL EVALUATION ADULT - PERTINENT HX OF CURRENT PROBLEM, REHAB EVAL
dyspnea, hypoxia, DM, pulmonary hypertension As per H&P:"72 F with PMHx of DM, Pulm HTN, ILD, who presents with sob. Pt started amoxicillin  for dental abscess day 9/10, Patient states she had been feeling sob for the past 3days that is not relieved by Pulmicort o Proventil. Patient also admits to productive cought with associated chest congestion. Today sob progressively got worse so pt come to ED.  Pt closely followed by PMD/Pulmonologist @ [place]"

## 2017-04-03 NOTE — PHYSICAL THERAPY INITIAL EVALUATION ADULT - GAIT TRAINING, PT EVAL
In 3-5 sessions pt will amb 200 feet x 2 with a rolling walker and supervision In 5 sessions pt will amb 25 feet x 2 with a rolling walker, O2 and supervision

## 2017-04-03 NOTE — PHYSICAL THERAPY INITIAL EVALUATION ADULT - PLANNED THERAPY INTERVENTIONS, PT EVAL
gait training/balance training/transfer training balance training/bed mobility training/gait training/transfer training

## 2017-04-03 NOTE — DISCHARGE NOTE ADULT - CARE PROVIDER_API CALL
Nancy Barajas  [place] [facility] [place]  [address], [place] [address]  Phone: [phone]  Fax: [phone]

## 2017-04-03 NOTE — PHYSICAL THERAPY INITIAL EVALUATION ADULT - IMPAIRED TRANSFERS: SIT/STAND, REHAB EVAL
decreased flexibility/impaired postural control/decreased strength/impaired balance/Endurance Poor+/decreased ROM

## 2017-04-03 NOTE — PHYSICAL THERAPY INITIAL EVALUATION ADULT - ADDITIONAL COMMENTS
Pt walked short distances in her home with a walker. Pt has home 02. Pt lives in a ranch style homw with 3 steps to enter with no handrail Pt walked short distances in her home with a walker. Pt has home 02 and is O2 dependent on 6 L of O2. Pt lives in a ranch style home with 3 steps to enter with no handrail. Pt is getting a ramp outside. Pt uses a rollator for ambulation for short distances due to SOB. Pt also has a wheelchair, shower chair and grab bars. Spouse is very supportive, daughter assist her as needed. Pt does have an aide for 4 hours/day for 4 days/wk.

## 2017-04-03 NOTE — PHYSICAL THERAPY INITIAL EVALUATION ADULT - GENERAL OBSERVATIONS, REHAB EVAL
Pt presents as a female, admitted 5/31, sitting in bedside chair, having difficult breathing. Telemetry and 02 intact 4 liters Pt received sitting on chair in tele /c SOB/GAUTHIER and on 7 L of O2 via nasal canula. Pt agreeable /c PT eval.

## 2017-04-03 NOTE — DISCHARGE NOTE ADULT - SECONDARY DIAGNOSIS.
Influenza B ILD (interstitial lung disease) Pulmonary hypertension DM (diabetes mellitus) Hypertension Hyperlipidemia

## 2017-04-03 NOTE — PROGRESS NOTE ADULT - PROBLEM SELECTOR PLAN 2
PT  monitor sats  supportive care
continue home medications -cellcept, adcirca and remodulin   O2 supplementation (patient uses 6L NC at home)  follow up with pulmonologist 
multifactorial  kyphosis  ild  copd  pulm htn  cont pulm htn regimen  cont mucinex  enc cough  o2 support  keep sat > 88 percent  anxiolytics  cont albuterol TID  cont prednisone, dc plan with medrol juan manuel  pt sees a pulmonary specialist at [facility]  discussed plan of care with the patient
continue home medications -cellcept, adcirca and remodulin   O2 supplementation (patient uses 6L NC at home), bipap as needed  follow up with pulmonologist

## 2017-04-03 NOTE — PROGRESS NOTE ADULT - ASSESSMENT
Ms. Woods is improved with no evidence of active cardiac issues. She had one 3 beat run of ventricular tachycardia but remains stable. No other evaluation is necessary at this time and she can be discharged home. She will follow up with us in the office for further evaluation. She has known normal ejection fraction by imaging in 2016
72 F with PMHx of DM, Pulm HTN, hld, ILD, who presents with sob admitted for acute respiratory distress secondary to ILD exacerbation found to be positive for influenza B .
72 F with PMHx of DM, Pulm HTN, hld, ILD, who presents with sob admitted for acute respiratory distress secondary to ILD exacerbation found to be positive for influenza B .
72F with PMHx of DM, Pulm HTN, ILD, p/w acute sob, respiratory distress, placed on Bipap support. ABG 7.38/53/117/29/5.6/50%/98% on Bipap 10/5 50% FiO2. Respiratory distress improved post bipap initiated. Pt also started on empiric abx/iv steroids. Pt refusing ICU admission due to her immunocompromised status and fear of developing "sepsis." Pt also does not want to be intubated should the need arise. Given this, there is no clear benefit of ICU admission at this time.  -Continue Bipap support. Titrate to FiO > 92%. check ABG. F/U Pulmonology (Dr. Neil)  -chest PT/IS/Nebs  -Continue abx/steroids  -NPO while on bipap. PPI  DVT PPx  -Supportive care  -Poor prognosis  -Case d/w Dr. Quintanilla, EICU attending

## 2017-04-03 NOTE — PHYSICAL THERAPY INITIAL EVALUATION ADULT - TRANSFER TRAINING, PT EVAL
in 3-5 sessions pt will transfer supine to sit to stand with supervision in 3-5 sessions pt will transfer sit<->stand with supervision and rolling walker

## 2017-04-03 NOTE — PHYSICAL THERAPY INITIAL EVALUATION ADULT - IMPAIRMENTS CONTRIBUTING TO GAIT DEVIATIONS, PT EVAL
SOB impaired postural control/impaired balance/decreased strength/SOB, SaO2 after ambulation 85% /c 7 L, improved to 89% in 1 minute/decreased flexibility/decreased ROM

## 2017-04-05 DIAGNOSIS — M40.209 UNSPECIFIED KYPHOSIS, SITE UNSPECIFIED: ICD-10-CM

## 2017-04-05 DIAGNOSIS — Z66 DO NOT RESUSCITATE: ICD-10-CM

## 2017-04-05 DIAGNOSIS — J44.9 CHRONIC OBSTRUCTIVE PULMONARY DISEASE, UNSPECIFIED: ICD-10-CM

## 2017-04-05 DIAGNOSIS — J10.1 INFLUENZA DUE TO OTHER IDENTIFIED INFLUENZA VIRUS WITH OTHER RESPIRATORY MANIFESTATIONS: ICD-10-CM

## 2017-04-05 DIAGNOSIS — I47.2 VENTRICULAR TACHYCARDIA: ICD-10-CM

## 2017-04-05 DIAGNOSIS — J84.9 INTERSTITIAL PULMONARY DISEASE, UNSPECIFIED: ICD-10-CM

## 2017-04-05 DIAGNOSIS — I27.2 OTHER SECONDARY PULMONARY HYPERTENSION: ICD-10-CM

## 2017-04-05 DIAGNOSIS — E11.9 TYPE 2 DIABETES MELLITUS WITHOUT COMPLICATIONS: ICD-10-CM

## 2017-04-06 LAB
CULTURE RESULTS: SIGNIFICANT CHANGE UP
CULTURE RESULTS: SIGNIFICANT CHANGE UP
SPECIMEN SOURCE: SIGNIFICANT CHANGE UP
SPECIMEN SOURCE: SIGNIFICANT CHANGE UP

## 2017-05-02 ENCOUNTER — INPATIENT (INPATIENT)
Facility: HOSPITAL | Age: 72
LOS: 4 days | Discharge: HOME HEALTH | DRG: 391 | End: 2017-05-07
Attending: HOSPITALIST | Admitting: HOSPITALIST
Payer: COMMERCIAL

## 2017-05-02 VITALS
HEART RATE: 100 BPM | OXYGEN SATURATION: 94 % | WEIGHT: 139.99 LBS | RESPIRATION RATE: 18 BRPM | DIASTOLIC BLOOD PRESSURE: 62 MMHG | TEMPERATURE: 98 F | SYSTOLIC BLOOD PRESSURE: 110 MMHG | HEIGHT: 66 IN

## 2017-05-02 DIAGNOSIS — Z90.710 ACQUIRED ABSENCE OF BOTH CERVIX AND UTERUS: Chronic | ICD-10-CM

## 2017-05-02 DIAGNOSIS — J84.9 INTERSTITIAL PULMONARY DISEASE, UNSPECIFIED: ICD-10-CM

## 2017-05-02 DIAGNOSIS — Z29.9 ENCOUNTER FOR PROPHYLACTIC MEASURES, UNSPECIFIED: ICD-10-CM

## 2017-05-02 DIAGNOSIS — I27.2 OTHER SECONDARY PULMONARY HYPERTENSION: ICD-10-CM

## 2017-05-02 DIAGNOSIS — E11.9 TYPE 2 DIABETES MELLITUS WITHOUT COMPLICATIONS: ICD-10-CM

## 2017-05-02 DIAGNOSIS — R06.00 DYSPNEA, UNSPECIFIED: ICD-10-CM

## 2017-05-02 DIAGNOSIS — R19.7 DIARRHEA, UNSPECIFIED: ICD-10-CM

## 2017-05-02 DIAGNOSIS — M40.209 UNSPECIFIED KYPHOSIS, SITE UNSPECIFIED: ICD-10-CM

## 2017-05-02 DIAGNOSIS — E78.5 HYPERLIPIDEMIA, UNSPECIFIED: ICD-10-CM

## 2017-05-02 DIAGNOSIS — R10.84 GENERALIZED ABDOMINAL PAIN: ICD-10-CM

## 2017-05-02 LAB
ALBUMIN SERPL ELPH-MCNC: 3.9 G/DL — SIGNIFICANT CHANGE UP (ref 3.3–5)
ALP SERPL-CCNC: 111 U/L — SIGNIFICANT CHANGE UP (ref 40–120)
ALT FLD-CCNC: 20 U/L — SIGNIFICANT CHANGE UP (ref 12–78)
AMYLASE P1 CFR SERPL: 44 U/L — SIGNIFICANT CHANGE UP (ref 25–115)
ANION GAP SERPL CALC-SCNC: 9 MMOL/L — SIGNIFICANT CHANGE UP (ref 5–17)
APPEARANCE UR: CLEAR — SIGNIFICANT CHANGE UP
APTT BLD: 29 SEC — SIGNIFICANT CHANGE UP (ref 27.5–37.4)
AST SERPL-CCNC: 15 U/L — SIGNIFICANT CHANGE UP (ref 15–37)
BACTERIA # UR AUTO: ABNORMAL
BASE EXCESS BLDA CALC-SCNC: 0.4 MMOL/L — SIGNIFICANT CHANGE UP (ref -2–2)
BASOPHILS NFR BLD AUTO: 1 % — SIGNIFICANT CHANGE UP (ref 0–2)
BILIRUB SERPL-MCNC: 0.5 MG/DL — SIGNIFICANT CHANGE UP (ref 0.2–1.2)
BILIRUB UR-MCNC: NEGATIVE — SIGNIFICANT CHANGE UP
BLOOD GAS COMMENTS ARTERIAL: SIGNIFICANT CHANGE UP
BUN SERPL-MCNC: 16 MG/DL — SIGNIFICANT CHANGE UP (ref 7–23)
CALCIUM SERPL-MCNC: 9.2 MG/DL — SIGNIFICANT CHANGE UP (ref 8.5–10.1)
CHLORIDE SERPL-SCNC: 94 MMOL/L — LOW (ref 96–108)
CO2 SERPL-SCNC: 32 MMOL/L — HIGH (ref 22–31)
COLOR SPEC: YELLOW — SIGNIFICANT CHANGE UP
CREAT SERPL-MCNC: 0.57 MG/DL — SIGNIFICANT CHANGE UP (ref 0.5–1.3)
DIFF PNL FLD: NEGATIVE — SIGNIFICANT CHANGE UP
EPI CELLS # UR: SIGNIFICANT CHANGE UP
GIANT PLATELETS BLD QL SMEAR: PRESENT — SIGNIFICANT CHANGE UP
GLUCOSE SERPL-MCNC: 177 MG/DL — HIGH (ref 70–99)
GLUCOSE UR QL: NEGATIVE — SIGNIFICANT CHANGE UP
HCO3 BLDA-SCNC: 24 MMOL/L — SIGNIFICANT CHANGE UP (ref 23–27)
HCT VFR BLD CALC: 45.6 % — HIGH (ref 34.5–45)
HGB BLD-MCNC: 14.4 G/DL — SIGNIFICANT CHANGE UP (ref 11.5–15.5)
HOROWITZ INDEX BLDA+IHG-RTO: 44 — SIGNIFICANT CHANGE UP
INR BLD: 1.12 RATIO — SIGNIFICANT CHANGE UP (ref 0.88–1.16)
KETONES UR-MCNC: ABNORMAL
LACTATE SERPL-SCNC: 1.5 MMOL/L — SIGNIFICANT CHANGE UP (ref 0.7–2)
LEUKOCYTE ESTERASE UR-ACNC: ABNORMAL
LG PLATELETS BLD QL AUTO: SLIGHT — SIGNIFICANT CHANGE UP
LIDOCAIN IGE QN: 172 U/L — SIGNIFICANT CHANGE UP (ref 73–393)
LYMPHOCYTES # BLD AUTO: 2 % — LOW (ref 13–44)
MCHC RBC-ENTMCNC: 31.3 PG — SIGNIFICANT CHANGE UP (ref 27–34)
MCHC RBC-ENTMCNC: 31.6 GM/DL — LOW (ref 32–36)
MCV RBC AUTO: 99.2 FL — SIGNIFICANT CHANGE UP (ref 80–100)
MONOCYTES NFR BLD AUTO: 1 % — SIGNIFICANT CHANGE UP (ref 1–9)
NEUTROPHILS NFR BLD AUTO: 93 % — HIGH (ref 43–77)
NEUTS BAND # BLD: 3 % — SIGNIFICANT CHANGE UP (ref 0–8)
NITRITE UR-MCNC: NEGATIVE — SIGNIFICANT CHANGE UP
PCO2 BLDA: 48 MMHG — HIGH (ref 32–46)
PH BLDA: 7.35 — SIGNIFICANT CHANGE UP (ref 7.35–7.45)
PH UR: 5 — SIGNIFICANT CHANGE UP (ref 5–8)
PLAT MORPH BLD: NORMAL — SIGNIFICANT CHANGE UP
PLATELET # BLD AUTO: 337 K/UL — SIGNIFICANT CHANGE UP (ref 150–400)
PO2 BLDA: 62 MMHG — LOW (ref 74–108)
POTASSIUM SERPL-MCNC: 4 MMOL/L — SIGNIFICANT CHANGE UP (ref 3.5–5.3)
POTASSIUM SERPL-SCNC: 4 MMOL/L — SIGNIFICANT CHANGE UP (ref 3.5–5.3)
PROT SERPL-MCNC: 7.5 G/DL — SIGNIFICANT CHANGE UP (ref 6–8.3)
PROT UR-MCNC: NEGATIVE — SIGNIFICANT CHANGE UP
PROTHROM AB SERPL-ACNC: 12.2 SEC — SIGNIFICANT CHANGE UP (ref 9.8–12.7)
RBC # BLD: 4.6 M/UL — SIGNIFICANT CHANGE UP (ref 3.8–5.2)
RBC # FLD: 13.7 % — SIGNIFICANT CHANGE UP (ref 10.3–14.5)
RBC BLD AUTO: SIGNIFICANT CHANGE UP
RBC CASTS # UR COMP ASSIST: SIGNIFICANT CHANGE UP /HPF (ref 0–4)
SAO2 % BLDA: 90 % — LOW (ref 92–96)
SODIUM SERPL-SCNC: 135 MMOL/L — SIGNIFICANT CHANGE UP (ref 135–145)
SP GR SPEC: 1 — LOW (ref 1.01–1.02)
UROBILINOGEN FLD QL: NEGATIVE — SIGNIFICANT CHANGE UP
WBC # BLD: 13.3 K/UL — HIGH (ref 3.8–10.5)
WBC # FLD AUTO: 13.3 K/UL — HIGH (ref 3.8–10.5)
WBC UR QL: SIGNIFICANT CHANGE UP

## 2017-05-02 PROCEDURE — 71010: CPT | Mod: 26

## 2017-05-02 PROCEDURE — 93010 ELECTROCARDIOGRAM REPORT: CPT

## 2017-05-02 PROCEDURE — 99223 1ST HOSP IP/OBS HIGH 75: CPT | Mod: AI,GC

## 2017-05-02 PROCEDURE — 99285 EMERGENCY DEPT VISIT HI MDM: CPT

## 2017-05-02 PROCEDURE — 71250 CT THORAX DX C-: CPT | Mod: 26

## 2017-05-02 PROCEDURE — 74176 CT ABD & PELVIS W/O CONTRAST: CPT | Mod: 26

## 2017-05-02 RX ORDER — SODIUM CHLORIDE 9 MG/ML
1000 INJECTION INTRAMUSCULAR; INTRAVENOUS; SUBCUTANEOUS
Qty: 0 | Refills: 0 | Status: COMPLETED | OUTPATIENT
Start: 2017-05-02 | End: 2017-05-02

## 2017-05-02 RX ORDER — INSULIN LISPRO 100/ML
VIAL (ML) SUBCUTANEOUS
Qty: 0 | Refills: 0 | Status: DISCONTINUED | OUTPATIENT
Start: 2017-05-02 | End: 2017-05-07

## 2017-05-02 RX ORDER — ENOXAPARIN SODIUM 100 MG/ML
40 INJECTION SUBCUTANEOUS DAILY
Qty: 0 | Refills: 0 | Status: DISCONTINUED | OUTPATIENT
Start: 2017-05-02 | End: 2017-05-07

## 2017-05-02 RX ORDER — DEXTROSE 50 % IN WATER 50 %
1 SYRINGE (ML) INTRAVENOUS ONCE
Qty: 0 | Refills: 0 | Status: DISCONTINUED | OUTPATIENT
Start: 2017-05-02 | End: 2017-05-07

## 2017-05-02 RX ORDER — PANTOPRAZOLE SODIUM 20 MG/1
40 TABLET, DELAYED RELEASE ORAL
Qty: 0 | Refills: 0 | Status: DISCONTINUED | OUTPATIENT
Start: 2017-05-02 | End: 2017-05-04

## 2017-05-02 RX ORDER — SIMVASTATIN 20 MG/1
20 TABLET, FILM COATED ORAL AT BEDTIME
Qty: 0 | Refills: 0 | Status: DISCONTINUED | OUTPATIENT
Start: 2017-05-02 | End: 2017-05-07

## 2017-05-02 RX ORDER — BUDESONIDE, MICRONIZED 100 %
1 POWDER (GRAM) MISCELLANEOUS
Qty: 0 | Refills: 0 | Status: DISCONTINUED | OUTPATIENT
Start: 2017-05-02 | End: 2017-05-07

## 2017-05-02 RX ORDER — SODIUM CHLORIDE 9 MG/ML
1000 INJECTION INTRAMUSCULAR; INTRAVENOUS; SUBCUTANEOUS
Qty: 0 | Refills: 0 | Status: DISCONTINUED | OUTPATIENT
Start: 2017-05-02 | End: 2017-05-04

## 2017-05-02 RX ORDER — DEXTROSE 50 % IN WATER 50 %
12.5 SYRINGE (ML) INTRAVENOUS ONCE
Qty: 0 | Refills: 0 | Status: DISCONTINUED | OUTPATIENT
Start: 2017-05-02 | End: 2017-05-07

## 2017-05-02 RX ORDER — GLIMEPIRIDE 1 MG
1 TABLET ORAL
Qty: 0 | Refills: 0 | COMMUNITY

## 2017-05-02 RX ORDER — ALBUTEROL 90 UG/1
2.5 AEROSOL, METERED ORAL EVERY 8 HOURS
Qty: 0 | Refills: 0 | Status: DISCONTINUED | OUTPATIENT
Start: 2017-05-02 | End: 2017-05-07

## 2017-05-02 RX ORDER — SODIUM CHLORIDE 9 MG/ML
1000 INJECTION, SOLUTION INTRAVENOUS
Qty: 0 | Refills: 0 | Status: DISCONTINUED | OUTPATIENT
Start: 2017-05-02 | End: 2017-05-07

## 2017-05-02 RX ORDER — DEXTROSE 50 % IN WATER 50 %
25 SYRINGE (ML) INTRAVENOUS ONCE
Qty: 0 | Refills: 0 | Status: DISCONTINUED | OUTPATIENT
Start: 2017-05-02 | End: 2017-05-07

## 2017-05-02 RX ORDER — INSULIN LISPRO 100/ML
VIAL (ML) SUBCUTANEOUS AT BEDTIME
Qty: 0 | Refills: 0 | Status: DISCONTINUED | OUTPATIENT
Start: 2017-05-02 | End: 2017-05-07

## 2017-05-02 RX ORDER — AMLODIPINE BESYLATE 2.5 MG/1
1 TABLET ORAL
Qty: 0 | Refills: 0 | COMMUNITY

## 2017-05-02 RX ORDER — SPIRONOLACTONE 25 MG/1
25 TABLET, FILM COATED ORAL DAILY
Qty: 0 | Refills: 0 | Status: DISCONTINUED | OUTPATIENT
Start: 2017-05-02 | End: 2017-05-07

## 2017-05-02 RX ORDER — MYCOPHENOLATE MOFETIL 250 MG/1
500 CAPSULE ORAL
Qty: 0 | Refills: 0 | Status: DISCONTINUED | OUTPATIENT
Start: 2017-05-02 | End: 2017-05-03

## 2017-05-02 RX ORDER — IOHEXOL 300 MG/ML
30 INJECTION, SOLUTION INTRAVENOUS ONCE
Qty: 0 | Refills: 0 | Status: COMPLETED | OUTPATIENT
Start: 2017-05-02 | End: 2017-05-02

## 2017-05-02 RX ORDER — GLUCAGON INJECTION, SOLUTION 0.5 MG/.1ML
1 INJECTION, SOLUTION SUBCUTANEOUS ONCE
Qty: 0 | Refills: 0 | Status: DISCONTINUED | OUTPATIENT
Start: 2017-05-02 | End: 2017-05-07

## 2017-05-02 RX ADMIN — IOHEXOL 30 MILLILITER(S): 300 INJECTION, SOLUTION INTRAVENOUS at 15:39

## 2017-05-02 RX ADMIN — SODIUM CHLORIDE 75 MILLILITER(S): 9 INJECTION INTRAMUSCULAR; INTRAVENOUS; SUBCUTANEOUS at 22:49

## 2017-05-02 RX ADMIN — SODIUM CHLORIDE 1000 MILLILITER(S): 9 INJECTION INTRAMUSCULAR; INTRAVENOUS; SUBCUTANEOUS at 17:48

## 2017-05-02 RX ADMIN — ALBUTEROL 2.5 MILLIGRAM(S): 90 AEROSOL, METERED ORAL at 23:24

## 2017-05-02 RX ADMIN — SODIUM CHLORIDE 1000 MILLILITER(S): 9 INJECTION INTRAMUSCULAR; INTRAVENOUS; SUBCUTANEOUS at 15:44

## 2017-05-02 RX ADMIN — SODIUM CHLORIDE 1000 MILLILITER(S): 9 INJECTION INTRAMUSCULAR; INTRAVENOUS; SUBCUTANEOUS at 15:38

## 2017-05-02 NOTE — H&P ADULT - PROBLEM SELECTOR PLAN 2
-strict i/o  -f/u stool cultures, will check for C. Diff if diarrhea worsens  -cont IVF at 75 cc/hour x 12 hours -reassess fluid status in AM  -monitor lytes closely, replete as necessary with AM labs -likely viral in etiology  -strict i/o  -f/u stool cultures, will check for C. Diff if diarrhea worsens  -cont IVF at 75 cc/hour x 12 hours -reassess fluid status in AM  -monitor lytes closely, replete as necessary with AM labs -likely viral in etiology  -strict i/o, encourage PO intake  -f/u stool cultures, will check for C. Diff if diarrhea worsens  -cont IVF at 75 cc/hour x 12 hours -reassess fluid status in AM  -monitor lytes closely, replete as necessary with AM labs -likely viral in etiology  -f/u stool cx and stool studies  -strict i/o, encourage PO intake  -f/u stool cultures, will check for C. Diff if diarrhea worsens  -cont IVF at 75 cc/hour x 12 hours -reassess fluid status in AM  -monitor lytes closely, replete as necessary with AM labs

## 2017-05-02 NOTE — H&P ADULT - PROBLEM SELECTOR PLAN 1
-admit to tele per ICU eval  -Dr. Neil, pulm saw pt in ED-appreciate recs  -cont supp O2 and encourage BiPAP use for resp distress to keep O2 sats >86%  -cont Albuterol TID  -cont prednisone 20mg daily with GI ppx (protonix 40mg daily)  -cont Pulmicort  -f/u AM labs  -f/u BCx and UCx  -cont ativan PRN for resp distress and anxiety  -monitor continuous pulse ox -admit to tele per ICU eval  -Dr. Neil, pulm saw pt in ED-appreciate recs  -cont supp O2 and encourage BiPAP use for resp distress to keep O2 sats >86%  -cont Albuterol TID  -cont prednisone 20mg daily with GI ppx (protonix 40mg daily)  -cont Pulmicort  -f/u AM labs  -f/u BCx and UCx  -cont ativan PRN for resp distress and anxiety  -monitor continuous pulse ox  -will check RVP now, f/u results

## 2017-05-02 NOTE — ED PROVIDER NOTE - CHPI ED SYMPTOMS NEG
no burning urination/no vomiting/no blood in stool/no fever/no chills/no dysuria/no abdominal distension/no hematuria/no nausea

## 2017-05-02 NOTE — ED ADULT NURSE REASSESSMENT NOTE - NS ED NURSE REASSESS COMMENT FT1
pt aox3, feeling much better, o2 sat. 96 in 6l nc, sent to ct, Dr Lola gonsalez
pt aox3, in resp distress after using commode, re evaluated by Dr Gordon, seen by resp therapist, refusing BIPAP, place on 6L O2, tolerating well
pt back from ct, no resp. distress, o2 sat 94% on 4l nc, pain free, c m s tachycardia, rate 106
called  to give report on 3rd floor, AURORA Sharma refused report at this time, charge RN Rodrigue kapadia aware.   patient resting comfortably in bed, respirations even and unlabored.  patient denies shortness of breath, chest pain, dizziness, nausea, palpitations.
received patient from Ronak SIMON.   patient presents alert and oriented X4. respirations even and slightly labored, Dr Davila and Dr. March aware.   Dr Davila and Dr. March aware when patient is lowered in bed that pulse ox drops in 80's.  patient placed on BIPAP as ordered.   patient denies chest pain, dizziness, plantations, headache, blurred vision.
patient is on BIPAP as ordered, respirations even and unlabored. b/l upper lungs clear. lower lungs slight rhonchi noted.   patient denies shortness of breath, chest pain, dizziness, plantations, headache, blurred vision. IV intact.

## 2017-05-02 NOTE — CONSULT NOTE ADULT - PROBLEM SELECTOR RECOMMENDATION 5
anxiolytics  Ativan PRN  albuterol  pulmicort  cont tx for Pulm HTN - adcirca, tyvaso,   may need Prednisone  ABG ordered  BIPAP ordered  pt encouraged to use BIPAP  keep sat > 86 pct  tele admit  I and O  prognosis guarded  pt has a very frail pulmonary status in the setting of severe anxiety and panic attacks

## 2017-05-02 NOTE — ED PROVIDER NOTE - OBJECTIVE STATEMENT
73 yo F p/w diarrhea x past ~ 6 days with lower abd discomfort. No fever/chills. No n/v. Some loss of appetite. no neck / back pain. No numb/ting/focal weak. no recent travel / sick contacts. No recent illness. No agg/allev factors. No other inj or co.

## 2017-05-02 NOTE — ED ADULT NURSE REASSESSMENT NOTE - GENERAL PATIENT STATE
comfortable appearance
comfortable appearance
cooperative/comfortable appearance/improvement verbalized

## 2017-05-02 NOTE — CONSULT NOTE ADULT - PROBLEM SELECTOR RECOMMENDATION 9
cont home meds  adcirca  tyvaso inhalation  pt can use her own home meds  o2 support  keep sat > 86 pct

## 2017-05-02 NOTE — CONSULT NOTE ADULT - SUBJECTIVE AND OBJECTIVE BOX
Date/Time Patient Seen:  		  Referring MD:   Data Reviewed	       Patient is a 72y old  Female who presents with a chief complaint of Diarrhea and Distress and SOB and GAUTHIER  pt with diarrhea for several days  sick contacts positive  has been to [facility] in [facility]  treated and released  cont to have frequent bouts of diarrhea  self medicated with immodium without relief  now with increasing work of breathing  has end stage ILD and severe Pulm HTN and restrictive lung disease due to significant Kyphosis      Subjective/HPI       Medication list         MEDICATIONS  (STANDING):  sodium chloride 0.9% Bolus 1000milliLiter(s) IV Bolus every 1 hour  buDESOnide  180 MICROgram(s) Inhaler 1Puff(s) Inhalation daily  ALBUTerol    0.083% 2.5milliGRAM(s) Nebulizer every 8 hours    MEDICATIONS  (PRN):  LORazepam   Injectable 0.5milliGRAM(s) IV Push every 4 hours PRN anxiety, distress, resp distress, agitation         Vitals log        ICU Vital Signs Last 24 Hrs  T(C): 37.3, Max: 37.3 (05-02 @ 15:28)  T(F): 99.2, Max: 99.2 (05-02 @ 15:28)  HR: 98 (98 - 100)  BP: 112/60 (110/62 - 112/60)  BP(mean): --  ABP: --  ABP(mean): --  RR: 18 (18 - 18)  SpO2: 97% (94% - 97%)           Input and Output:  I&O's Detail      Lab Data                        14.4   13.3  )-----------( 337      ( 02 May 2017 15:30 )             45.6     05-02    135  |  94<L>  |  16  ----------------------------<  177<H>  4.0   |  32<H>  |  0.57    Ca    9.2      02 May 2017 15:30    TPro  7.5  /  Alb  3.9  /  TBili  0.5  /  DBili  x   /  AST  15  /  ALT  20  /  AlkPhos  111  05-02    ABG - ( 02 May 2017 17:31 )  pH: 7.35  /  pCO2: 48    /  pO2: 62    / HCO3: 24    / Base Excess: 0.4   /  SaO2: 90            non smoker  non drinker    lives at home with hubby and extended family  retired from [employer]             	  resp distress, head at, anxious, kyphosis, extr - no gross edema, cn grossly int, moves all extr,   lungs - no wheezing, diminished BS      Objective     Physical Examination        Pertinent Lab findings & Imaging      [place]:  NO   Adequate UO     I&O's Detail           Discussed with:     Cultures:	        Radiology

## 2017-05-02 NOTE — H&P ADULT - HISTORY OF PRESENT ILLNESS
This is a 72 female with PMHx of DM2, Pulm HTN, end stage ILD followed closely with Pulm @ [facility] Dr. Barajas p/w diarrhea x 1 week and worsening weakness, SOB, GAUTHIER. Pt states she first noticed the diarrhea last Wednesday, bc her son-in-law was sick with the same symptoms. She tried Imodium with minimal relief. She went to [facility] this past Friday, but due to no electrolyte abnormalities was discharged. Pt developed worsening watery, non-bloody diarrhea over the last 24 hours, with 4-5 episodes today. Pt states "this is not C.Diff because I had in last year and know what it is like." Pt states the diarrhea got worse due to having +sick contacts the last few days in the house. Pt was recently admitted for SOB with +influenza 4/1/2017, treated with tamiflu and azithromycin. Pt states she is on home O2 6L, can ambulate minimally with walker without GAUTHIER. 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 Pt denies CP, palpitations, n/v/, HA, dizziness, fevers or chills. Pt admits to lower abd discomfort, nasal congestion and sick contacts.     In the ED, pt was tachypneic and tachycardic. Pt O2 sats decrease to 60-70% if she moves around or lies supine. Pt O2 sats now 96% on BiPaP. Pt does not want intubation. Pt labs sig for WBC of 13.3, ABG done, UA negative, XRAY stable, CT chest/abd stable. Pt given 1L NS bolus.     ICU eval done in ED, deemed appropriate for tele admission.  [place], Dr. Neil, saw pt in ED-appreciate recs. This is a 72 female with PMHx of DM2, HLD, Pulm HTN, end stage ILD followed closely with Pulm @ [facility] Dr. Barajas p/w diarrhea x 1 week and worsening weakness, SOB, GAUTHIER. Pt states she first noticed the diarrhea last Wednesday, bc her son-in-law was sick with the same symptoms. She tried Imodium with minimal relief. She went to [facility] this past Friday, but due to no electrolyte abnormalities was discharged. Pt developed worsening watery, non-bloody diarrhea over the last 24 hours, with 4-5 episodes today. Pt states "this is not C.Diff because I had in last year and know what it is like." Pt states the diarrhea got worse due to having +sick contacts the last few days in the house. Pt was recently admitted for SOB with +influenza 4/1/2017, treated with tamiflu and azithromycin. Pt states she is on home O2 6L, can ambulate minimally with walker without GAUTHIER. 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 Pt denies CP, palpitations, n/v/, HA, dizziness, fevers or chills. Pt admits to lower abd discomfort, nasal congestion and sick contacts. Pt denies recent travel, hematochezia or melena.    In the ED, pt was tachypneic and tachycardic. Pt O2 sats decrease to 60-70% if she moves around or lies supine. Pt O2 sats now 96% on BiPaP. Pt does not want intubation. Pt labs sig for WBC of 13.3, ABG done, UA negative, XRAY stable, CT chest/abd stable. Pt given 1L NS bolus.     ICU eval done in ED, deemed appropriate for tele admission.  Pulm, Dr. Neil, saw pt in ED-appreciate recs.

## 2017-05-02 NOTE — H&P ADULT - NSHPLABSRESULTS_GEN_ALL_CORE
14.4   13.3  )-----------( 337      ( 02 May 2017 15:30 )             45.6     02 May 2017 15:30    135    |  94     |  16     ----------------------------<  177    4.0     |  32     |  0.57     Ca    9.2        02 May 2017 15:30    TPro  7.5    /  Alb  3.9    /  TBili  0.5    /  DBili  x      /  AST  15     /  ALT  20     /  AlkPhos  111    02 May 2017 15:30    LIVER FUNCTIONS - ( 02 May 2017 15:30 )  Alb: 3.9 g/dL / Pro: 7.5 g/dL / ALK PHOS: 111 U/L / ALT: 20 U/L / AST: 15 U/L / GGT: x           PT/INR - ( 02 May 2017 15:30 )   PT: 12.2 sec;   INR: 1.12 ratio         PTT - ( 02 May 2017 15:30 )  PTT:29.0 sec  CAPILLARY BLOOD GLUCOSE        Urinalysis Basic - ( 02 May 2017 20:17 )    Color: Yellow / Appearance: Clear / S.005 / pH: x  Gluc: x / Ketone: Small  / Bili: Negative / Urobili: Negative   Blood: x / Protein: Negative / Nitrite: Negative   Leuk Esterase: Trace / RBC: 0-2 /HPF / WBC 0-2   Sq Epi: x / Non Sq Epi: Occasional / Bacteria: Occasional

## 2017-05-02 NOTE — H&P ADULT - NEGATIVE ENMT SYMPTOMS
no nasal congestion/no tinnitus/no ear pain/no hearing difficulty/no vertigo/no nasal discharge/no sinus symptoms no vertigo/no tinnitus/no ear pain/no hearing difficulty/no nasal discharge/no throat pain/no sinus symptoms

## 2017-05-02 NOTE — H&P ADULT - NEGATIVE CARDIOVASCULAR SYMPTOMS
no chest pain/no dyspnea on exertion/no palpitations no peripheral edema/no palpitations/no chest pain

## 2017-05-02 NOTE — H&P ADULT - PMH
DM (diabetes mellitus)  pulmonary htn  Pulmonary hypertension Chronic interstitial lung disease    DM (diabetes mellitus)  pulmonary htn  Pulmonary hypertension

## 2017-05-02 NOTE — CONSULT NOTE ADULT - PROBLEM SELECTOR RECOMMENDATION 4
IVF  I and O  tele admit  monitor labs and lytes and cr and volume status  consider C diff if cont to have voluminous diarrhea  enc PO intake

## 2017-05-02 NOTE — H&P ADULT - NSHPSOCIALHISTORY_GEN_ALL_CORE
Social History:    Marital Status:  (  x )    (   ) Single    (   )    (  )   Occupation:   Lives with: (  ) alone  (  ) children   ( x ) spouse   (  ) parents  (  ) other    Substance Use (street drugs): ( x ) never used  (  ) other:  Tobacco Usage:  (  x ) never smoked   (   ) former smoker   (   ) current smoker  (     ) pack year  (        ) last cigarette date  Alcohol Usage:  Sexual History:     Health Management     For female:   Last Mammo: few years    Last Pap: (     ) No  (    ) Yes  (    ) Normal   (    ) Date       Immunization Hx:   ( x ) flu shot                               (     ) date   ( x ) pneumonia shot               (     ) date  (  ) tetanus                               (     ) date     (     ) Advanced Directives: (     ) None    (      ) DNR    (     ) DNI    (     ) Health Care Proxy: Social History:    Marital Status:  (  x )    (   ) Single    (   )    (  )   Occupation: retired   Lives with: (  ) alone  (  ) children   ( x ) spouse   (  ) parents  (  ) other    Substance Use (street drugs): ( x ) never used  (  ) other:  Tobacco Usage:  (  x ) never smoked   (   ) former smoker   (   ) current smoker  (     ) pack year  (        ) last cigarette date  Alcohol Usage: never      (     ) Advanced Directives: (     ) None    (   x   ) DNR    (   x  ) DNI    (     ) Health Care Proxy: -pt needs to fill out MOLST form in morning Social History:    Marital Status:  (  x )    (   ) Single    (   )    (  )   Occupation: retired   Lives with: (  ) alone  (  ) children   ( x ) spouse   (  ) parents  (  ) other    Substance Use (street drugs): ( x ) never used  (  ) other:  Tobacco Usage:  (  x ) never smoked   (   ) former smoker   (   ) current smoker  (     ) pack year  (        ) last cigarette date  Alcohol Usage: never      (     ) Advanced Directives: (     ) None    (   x   ) DNR    (   x  ) DNI    (     ) Health Care Proxy: -form filled out in ED with Dr. March

## 2017-05-02 NOTE — H&P ADULT - ASSESSMENT
This is a 72 female with PMHx of DM2, Pulm HTN, end stage ILD followed closely with Pulm @ [facility] Dr. Barajas p/w diarrhea x 1 week and worsening weakness, SOB, GAUTHIER admitted to tele for acute respiratory distress and viral gastroenteritis.

## 2017-05-02 NOTE — ED PROVIDER NOTE - ENMT, MLM
Airway patent, Nasal mucosa clear. Mouth with normal mucosa. Throat has no vesicles, no oropharyngeal exudates and uvula is midline. MM Slight dry. Neck supple.

## 2017-05-02 NOTE — CHART NOTE - NSCHARTNOTEFT_GEN_A_CORE
71 y/o female with hx ILD, pulm HTN on home O2, cellcept, tadalafil, treprostinil, anxiety, DM2 came with nausea and diarrhea with mild abdominal pain, reports sick contact.  Denies chest pain, palpitations, vomiting, fever, chills  C/o breathing a little faster than baseline due to dehydration    VS: 119/55, , 99% on 5L, RR 26  AAO x 3, no focal deficits  pupils reactive  dry mucus membranes  coarse crackles on lung auscultation  regular and tachy S1S2  abd soft, +hernia, mild mid abdominal tenderness, no rebound/guarding, +BS  no edema  skin well perfused    labs and CTs reviewed    Imp:   suspected viral gastroenteritis  ILD, pulm HTN  DM2  anxiety    Plan:  ivf, symptomatic mx  continue pulm HTN meds  O2 (5-6L)  other mx per med, pulm  patient is not in any resp distress, does not need higher level of care  no need for ICU at present, reconsult prn  d/w  at bedside and Dr. Davila (ED)

## 2017-05-02 NOTE — H&P ADULT - ATTENDING COMMENTS
This is a 72 female with PMHx of DM2, Pulm HTN, end stage ILD admitted to tele for acute respiratory distress and viral gastroenteritis.  Pulmonary saw pt in the ED already, appreciate recommendations.  Pt in respiratory distress, keep on BIPAP overnight, monitor for any changes.  NOt an ICU candidate.  If respiratory symptoms worsen, then consider CTA to rule out PE.

## 2017-05-03 LAB
ANION GAP SERPL CALC-SCNC: 9 MMOL/L — SIGNIFICANT CHANGE UP (ref 5–17)
BUN SERPL-MCNC: 10 MG/DL — SIGNIFICANT CHANGE UP (ref 7–23)
CALCIUM SERPL-MCNC: 8.6 MG/DL — SIGNIFICANT CHANGE UP (ref 8.5–10.1)
CHLORIDE SERPL-SCNC: 107 MMOL/L — SIGNIFICANT CHANGE UP (ref 96–108)
CO2 SERPL-SCNC: 30 MMOL/L — SIGNIFICANT CHANGE UP (ref 22–31)
CREAT SERPL-MCNC: 0.36 MG/DL — LOW (ref 0.5–1.3)
GLUCOSE SERPL-MCNC: 90 MG/DL — SIGNIFICANT CHANGE UP (ref 70–99)
HCT VFR BLD CALC: 36.7 % — SIGNIFICANT CHANGE UP (ref 34.5–45)
HGB BLD-MCNC: 11.4 G/DL — LOW (ref 11.5–15.5)
MCHC RBC-ENTMCNC: 31.2 GM/DL — LOW (ref 32–36)
MCHC RBC-ENTMCNC: 31.5 PG — SIGNIFICANT CHANGE UP (ref 27–34)
MCV RBC AUTO: 101 FL — HIGH (ref 80–100)
PLATELET # BLD AUTO: 278 K/UL — SIGNIFICANT CHANGE UP (ref 150–400)
POTASSIUM SERPL-MCNC: 3.9 MMOL/L — SIGNIFICANT CHANGE UP (ref 3.5–5.3)
POTASSIUM SERPL-SCNC: 3.9 MMOL/L — SIGNIFICANT CHANGE UP (ref 3.5–5.3)
RAPID RVP RESULT: SIGNIFICANT CHANGE UP
RBC # BLD: 3.63 M/UL — LOW (ref 3.8–5.2)
RBC # FLD: 14.2 % — SIGNIFICANT CHANGE UP (ref 10.3–14.5)
SODIUM SERPL-SCNC: 146 MMOL/L — HIGH (ref 135–145)
WBC # BLD: 9.3 K/UL — SIGNIFICANT CHANGE UP (ref 3.8–10.5)
WBC # FLD AUTO: 9.3 K/UL — SIGNIFICANT CHANGE UP (ref 3.8–10.5)

## 2017-05-03 PROCEDURE — 99233 SBSQ HOSP IP/OBS HIGH 50: CPT | Mod: GC

## 2017-05-03 RX ORDER — ACETAMINOPHEN 500 MG
650 TABLET ORAL EVERY 6 HOURS
Qty: 0 | Refills: 0 | Status: COMPLETED | OUTPATIENT
Start: 2017-05-03 | End: 2017-05-05

## 2017-05-03 RX ORDER — MYCOPHENOLATE MOFETIL 250 MG/1
1000 CAPSULE ORAL
Qty: 0 | Refills: 0 | Status: DISCONTINUED | OUTPATIENT
Start: 2017-05-03 | End: 2017-05-07

## 2017-05-03 RX ORDER — ALPRAZOLAM 0.25 MG
0.25 TABLET ORAL ONCE
Qty: 0 | Refills: 0 | Status: DISCONTINUED | OUTPATIENT
Start: 2017-05-03 | End: 2017-05-03

## 2017-05-03 RX ORDER — MYCOPHENOLATE MOFETIL 250 MG/1
1 CAPSULE ORAL
Qty: 0 | Refills: 0 | COMMUNITY

## 2017-05-03 RX ORDER — ALPRAZOLAM 0.25 MG
0.25 TABLET ORAL DAILY
Qty: 0 | Refills: 0 | Status: DISCONTINUED | OUTPATIENT
Start: 2017-05-04 | End: 2017-05-07

## 2017-05-03 RX ORDER — TADALAFIL 10 MG/1
40 TABLET, FILM COATED ORAL DAILY
Qty: 0 | Refills: 0 | Status: DISCONTINUED | OUTPATIENT
Start: 2017-05-03 | End: 2017-05-07

## 2017-05-03 RX ORDER — TREPROSTINIL 48 UG/1
18 INHALANT ORAL
Qty: 0 | Refills: 0 | Status: DISCONTINUED | OUTPATIENT
Start: 2017-05-03 | End: 2017-05-07

## 2017-05-03 RX ADMIN — Medication 0: at 17:24

## 2017-05-03 RX ADMIN — Medication 1: at 12:20

## 2017-05-03 RX ADMIN — Medication 650 MILLIGRAM(S): at 21:50

## 2017-05-03 RX ADMIN — ALBUTEROL 2.5 MILLIGRAM(S): 90 AEROSOL, METERED ORAL at 16:06

## 2017-05-03 RX ADMIN — TREPROSTINIL 18 MICROGRAM(S): 48 INHALANT ORAL at 07:00

## 2017-05-03 RX ADMIN — TREPROSTINIL 18 MICROGRAM(S): 48 INHALANT ORAL at 19:47

## 2017-05-03 RX ADMIN — ALBUTEROL 2.5 MILLIGRAM(S): 90 AEROSOL, METERED ORAL at 23:11

## 2017-05-03 RX ADMIN — Medication 650 MILLIGRAM(S): at 22:49

## 2017-05-03 RX ADMIN — ENOXAPARIN SODIUM 40 MILLIGRAM(S): 100 INJECTION SUBCUTANEOUS at 11:15

## 2017-05-03 RX ADMIN — Medication 20 MILLIGRAM(S): at 06:06

## 2017-05-03 RX ADMIN — SPIRONOLACTONE 25 MILLIGRAM(S): 25 TABLET, FILM COATED ORAL at 06:06

## 2017-05-03 RX ADMIN — TADALAFIL 40 MILLIGRAM(S): 10 TABLET, FILM COATED ORAL at 07:00

## 2017-05-03 RX ADMIN — Medication 0: at 08:03

## 2017-05-03 RX ADMIN — MYCOPHENOLATE MOFETIL 1000 MILLIGRAM(S): 250 CAPSULE ORAL at 17:24

## 2017-05-03 RX ADMIN — SIMVASTATIN 20 MILLIGRAM(S): 20 TABLET, FILM COATED ORAL at 21:52

## 2017-05-03 RX ADMIN — ALBUTEROL 2.5 MILLIGRAM(S): 90 AEROSOL, METERED ORAL at 08:09

## 2017-05-03 RX ADMIN — Medication 0.25 MILLIGRAM(S): at 06:06

## 2017-05-03 RX ADMIN — MYCOPHENOLATE MOFETIL 1000 MILLIGRAM(S): 250 CAPSULE ORAL at 06:19

## 2017-05-03 RX ADMIN — TREPROSTINIL 18 MICROGRAM(S): 48 INHALANT ORAL at 15:15

## 2017-05-03 RX ADMIN — Medication 1 PUFF(S): at 19:07

## 2017-05-03 RX ADMIN — TREPROSTINIL 18 MICROGRAM(S): 48 INHALANT ORAL at 12:22

## 2017-05-03 RX ADMIN — Medication 0.25 MILLIGRAM(S): at 20:06

## 2017-05-03 NOTE — PROGRESS NOTE ADULT - SUBJECTIVE AND OBJECTIVE BOX
INTERVAL HPI/OVERNIGHT EVENTS: No acute events overnight. Tolerated BIPAP. States that she was upset last night due to her room placement. Admits to anxiety. States that she had a loose bowel movement this morning. Denies fevers, chills, CP, palpitations, SOB, nausea, vomiting, and abd pain. Would like to be on a diet without renal restrictions, as she enjoys bananas and fresh fruit.    MEDICATIONS  (STANDING):  buDESOnide  180 MICROgram(s) Inhaler 1Puff(s) Inhalation daily  ALBUTerol    0.083% 2.5milliGRAM(s) Nebulizer every 8 hours  predniSONE   Tablet 20milliGRAM(s) Oral daily  spironolactone 25milliGRAM(s) Oral daily  simvastatin 20milliGRAM(s) Oral at bedtime  pantoprazole    Tablet 40milliGRAM(s) Oral before breakfast  enoxaparin Injectable 40milliGRAM(s) SubCutaneous daily  sodium chloride 0.9%. 1000milliLiter(s) IV Continuous <Continuous>  insulin lispro (HumaLOG) corrective regimen sliding scale  SubCutaneous three times a day before meals  insulin lispro (HumaLOG) corrective regimen sliding scale  SubCutaneous at bedtime  dextrose 5%. 1000milliLiter(s) IV Continuous <Continuous>  dextrose 50% Injectable 12.5Gram(s) IV Push once  dextrose 50% Injectable 25Gram(s) IV Push once  dextrose 50% Injectable 25Gram(s) IV Push once  mycophenolate mofetil 1000milliGRAM(s) Oral two times a day  tadalafil 40milliGRAM(s) Oral daily  treprostinil Inhalation 18MICROGram(s) Inhalation four times a day    MEDICATIONS  (PRN):  LORazepam   Injectable 0.5milliGRAM(s) IV Push every 4 hours PRN anxiety, distress, resp distress, agitation  dextrose Gel 1Dose(s) Oral once PRN Blood Glucose LESS THAN 70 milliGRAM(s)/deciliter  glucagon  Injectable 1milliGRAM(s) IntraMuscular once PRN Glucose LESS THAN 70 milligrams/deciliter      Allergies    No Known Allergies    Intolerances        CONSTITUTIONAL: No weakness, fevers or chills  EYES/ENT: No visual changes;  No vertigo or throat pain   NECK: No pain or stiffness  RESPIRATORY: No cough, wheezing, hemoptysis; No shortness of breath  CARDIOVASCULAR: No chest pain or palpitations  GASTROINTESTINAL: No abdominal or epigastric pain. No nausea, vomiting, or hematemesis; No diarrhea or constipation. No melena or hematochezia.  GENITOURINARY: No dysuria, frequency or hematuria  NEUROLOGICAL: No numbness or weakness  SKIN: No itching, burning, rashes, or lesions   PSYCH: +anxiety  All other review of systems is negative unless indicated above.    Vital Signs Last 24 Hrs  T(C): 36.3, Max: 37.3 ( @ 15:28)  T(F): 97.4, Max: 99.2 ( @ 15:28)  HR: 88 (88 - 123)  BP: 102/65 (93/56 - 120/82)  BP(mean): --  RR: 25 (16 - 25)  SpO2: 90% (88% - 99%)      General: thin female in NAD  Neurology: A&Ox3, nonfocal, BLACKMAN x 4  Respiratory: decreased breath sounds, able to speak full sentences  CV: +S1S2, tachycardic  Abdominal: Soft, NT, ND +BS all 4 quadrants  Extremities: No edema, +peripheral pulses  Skin: chronic ecchymosis b/l UE      LABS:                        11.4   9.3   )-----------( 278      ( 03 May 2017 07:16 )             36.7     05-    146<H>  |  107  |  10  ----------------------------<  90  3.9   |  30  |  0.36<L>    Ca    8.6      03 May 2017 07:16    TPro  7.5  /  Alb  3.9  /  TBili  0.5  /  DBili  x   /  AST  15  /  ALT  20  /  AlkPhos  111  05-02    PT/INR - ( 02 May 2017 15:30 )   PT: 12.2 sec;   INR: 1.12 ratio         PTT - ( 02 May 2017 15:30 )  PTT:29.0 sec  Urinalysis Basic - ( 02 May 2017 20:17 )    Color: Yellow / Appearance: Clear / S.005 / pH: x  Gluc: x / Ketone: Small  / Bili: Negative / Urobili: Negative   Blood: x / Protein: Negative / Nitrite: Negative   Leuk Esterase: Trace / RBC: 0-2 /HPF / WBC 0-2   Sq Epi: x / Non Sq Epi: Occasional / Bacteria: Occasional        RADIOLOGY & ADDITIONAL TESTS: INTERVAL HPI/OVERNIGHT EVENTS: No acute events overnight. Tolerated BIPAP. States that she was upset last night due to her room placement. Admits to anxiety. States that she had a loose bowel movement this morning. Denies fevers, chills, CP, palpitations, SOB, nausea, vomiting, and abd pain. Would like to be on a diet without renal restrictions, as she enjoys bananas and fresh fruit.    MEDICATIONS  (STANDING):  buDESOnide  180 MICROgram(s) Inhaler 1Puff(s) Inhalation daily  ALBUTerol    0.083% 2.5milliGRAM(s) Nebulizer every 8 hours  predniSONE   Tablet 20milliGRAM(s) Oral daily  spironolactone 25milliGRAM(s) Oral daily  simvastatin 20milliGRAM(s) Oral at bedtime  pantoprazole    Tablet 40milliGRAM(s) Oral before breakfast  enoxaparin Injectable 40milliGRAM(s) SubCutaneous daily  sodium chloride 0.9%. 1000milliLiter(s) IV Continuous <Continuous>  insulin lispro (HumaLOG) corrective regimen sliding scale  SubCutaneous three times a day before meals  insulin lispro (HumaLOG) corrective regimen sliding scale  SubCutaneous at bedtime  dextrose 5%. 1000milliLiter(s) IV Continuous <Continuous>  dextrose 50% Injectable 12.5Gram(s) IV Push once  dextrose 50% Injectable 25Gram(s) IV Push once  dextrose 50% Injectable 25Gram(s) IV Push once  mycophenolate mofetil 1000milliGRAM(s) Oral two times a day  tadalafil 40milliGRAM(s) Oral daily  treprostinil Inhalation 18MICROGram(s) Inhalation four times a day    MEDICATIONS  (PRN):  LORazepam   Injectable 0.5milliGRAM(s) IV Push every 4 hours PRN anxiety, distress, resp distress, agitation  dextrose Gel 1Dose(s) Oral once PRN Blood Glucose LESS THAN 70 milliGRAM(s)/deciliter  glucagon  Injectable 1milliGRAM(s) IntraMuscular once PRN Glucose LESS THAN 70 milligrams/deciliter      Allergies    No Known Allergies    Intolerances        CONSTITUTIONAL: No weakness, fevers or chills  EYES/ENT: No visual changes;  No vertigo or throat pain   NECK: No pain or stiffness  RESPIRATORY: No cough, wheezing, hemoptysis; No shortness of breath  CARDIOVASCULAR: No chest pain or palpitations  GASTROINTESTINAL: No abdominal or epigastric pain. No nausea, vomiting, or hematemesis; No diarrhea or constipation. No melena or hematochezia.  GENITOURINARY: No dysuria, frequency or hematuria  NEUROLOGICAL: No numbness or weakness  SKIN: No itching, burning, rashes, or lesions   PSYCH: +anxiety  All other review of systems is negative unless indicated above.    Vital Signs Last 24 Hrs  T(C): 36.3, Max: 37.3 ( @ 15:28)  T(F): 97.4, Max: 99.2 ( @ 15:28)  HR: 88 (88 - 123)  BP: 102/65 (93/56 - 120/82)  BP(mean): --  RR: 25 (16 - 25)  SpO2: 90% (88% - 99%)      General: thin female in NAD  Neurology: A&Ox3, nonfocal, BLACKMAN x 4  Respiratory: decreased breath sounds, able to speak full sentences; coarse breath sounds R>L; no wheeze  CV: +S1S2, tachycardic  Abdominal: Soft, NT, ND +BS all 4 quadrants  Extremities: No edema, +peripheral pulses  Skin: chronic ecchymosis b/l UE      LABS:                        11.4   9.3   )-----------( 278      ( 03 May 2017 07:16 )             36.7     05-03    146<H>  |  107  |  10  ----------------------------<  90  3.9   |  30  |  0.36<L>    Ca    8.6      03 May 2017 07:16    TPro  7.5  /  Alb  3.9  /  TBili  0.5  /  DBili  x   /  AST  15  /  ALT  20  /  AlkPhos  111  05-02    PT/INR - ( 02 May 2017 15:30 )   PT: 12.2 sec;   INR: 1.12 ratio         PTT - ( 02 May 2017 15:30 )  PTT:29.0 sec  Urinalysis Basic - ( 02 May 2017 20:17 )    Color: Yellow / Appearance: Clear / S.005 / pH: x  Gluc: x / Ketone: Small  / Bili: Negative / Urobili: Negative   Blood: x / Protein: Negative / Nitrite: Negative   Leuk Esterase: Trace / RBC: 0-2 /HPF / WBC 0-2   Sq Epi: x / Non Sq Epi: Occasional / Bacteria: Occasional        RADIOLOGY & ADDITIONAL TESTS: INTERVAL HPI/OVERNIGHT EVENTS: No acute events overnight. Tolerated BIPAP. States that she was upset last night due to her room placement. Admits to anxiety, some sob out of baseline. States that she had a loose bowel movement x1 this morning. Denies fevers, chills, CP, palpitations,  nausea, vomiting, and abd pain. Would like to be on a diet without renal restrictions, as she enjoys bananas and fresh fruit.    MEDICATIONS  (STANDING):  buDESOnide  180 MICROgram(s) Inhaler 1Puff(s) Inhalation daily  ALBUTerol    0.083% 2.5milliGRAM(s) Nebulizer every 8 hours  predniSONE   Tablet 20milliGRAM(s) Oral daily  spironolactone 25milliGRAM(s) Oral daily  simvastatin 20milliGRAM(s) Oral at bedtime  pantoprazole    Tablet 40milliGRAM(s) Oral before breakfast  enoxaparin Injectable 40milliGRAM(s) SubCutaneous daily  sodium chloride 0.9%. 1000milliLiter(s) IV Continuous <Continuous>  insulin lispro (HumaLOG) corrective regimen sliding scale  SubCutaneous three times a day before meals  insulin lispro (HumaLOG) corrective regimen sliding scale  SubCutaneous at bedtime  dextrose 5%. 1000milliLiter(s) IV Continuous <Continuous>  dextrose 50% Injectable 12.5Gram(s) IV Push once  dextrose 50% Injectable 25Gram(s) IV Push once  dextrose 50% Injectable 25Gram(s) IV Push once  mycophenolate mofetil 1000milliGRAM(s) Oral two times a day  tadalafil 40milliGRAM(s) Oral daily  treprostinil Inhalation 18MICROGram(s) Inhalation four times a day    MEDICATIONS  (PRN):  LORazepam   Injectable 0.5milliGRAM(s) IV Push every 4 hours PRN anxiety, distress, resp distress, agitation  dextrose Gel 1Dose(s) Oral once PRN Blood Glucose LESS THAN 70 milliGRAM(s)/deciliter  glucagon  Injectable 1milliGRAM(s) IntraMuscular once PRN Glucose LESS THAN 70 milligrams/deciliter      Allergies    No Known Allergies    Intolerances        CONSTITUTIONAL: No weakness, fevers or chills  EYES/ENT: No visual changes;  No vertigo or throat pain   NECK: No pain or stiffness  RESPIRATORY: No cough, wheezing, hemoptysis; No shortness of breath  CARDIOVASCULAR: No chest pain or palpitations  GASTROINTESTINAL: No abdominal or epigastric pain. No nausea, vomiting, or hematemesis; No diarrhea or constipation. No melena or hematochezia.  GENITOURINARY: No dysuria, frequency or hematuria  NEUROLOGICAL: No numbness or weakness  SKIN: No itching, burning, rashes, or lesions   PSYCH: +anxiety  All other review of systems is negative unless indicated above.    Vital Signs Last 24 Hrs  T(C): 36.3, Max: 37.3 ( @ 15:28)  T(F): 97.4, Max: 99.2 ( @ 15:28)  HR: 88 (88 - 123)  BP: 102/65 (93/56 - 120/82)  BP(mean): --  RR: 25 (16 - 25)  SpO2: 90% (88% - 99%)      General: thin female in NAD, chronically ill appearing  Neurology: A&Ox3, nonfocal, BLACKMAN x 4  Respiratory: decreased breath sounds, able to speak full sentences; coarse breath sounds R>L; no wheeze  CV: +S1S2, tachycardic  Abdominal: Soft, NT, ND +BS all 4 quadrants  Extremities: No edema, +peripheral pulses  Skin: chronic ecchymosis b/l UE      LABS:                        11.4   9.3   )-----------( 278      ( 03 May 2017 07:16 )             36.7     -    146<H>  |  107  |  10  ----------------------------<  90  3.9   |  30  |  0.36<L>    Ca    8.6      03 May 2017 07:16    TPro  7.5  /  Alb  3.9  /  TBili  0.5  /  DBili  x   /  AST  15  /  ALT  20  /  AlkPhos  111  05-02    PT/INR - ( 02 May 2017 15:30 )   PT: 12.2 sec;   INR: 1.12 ratio         PTT - ( 02 May 2017 15:30 )  PTT:29.0 sec  Urinalysis Basic - ( 02 May 2017 20:17 )    Color: Yellow / Appearance: Clear / S.005 / pH: x  Gluc: x / Ketone: Small  / Bili: Negative / Urobili: Negative   Blood: x / Protein: Negative / Nitrite: Negative   Leuk Esterase: Trace / RBC: 0-2 /HPF / WBC 0-2   Sq Epi: x / Non Sq Epi: Occasional / Bacteria: Occasional        RADIOLOGY & ADDITIONAL TESTS:

## 2017-05-03 NOTE — PROGRESS NOTE ADULT - ASSESSMENT
This is a 72 female with PMHx of DM2, Pulm HTN, end stage ILD followed closely with Pulm @ [facility] Dr. Barajas p/w diarrhea x 1 week and worsening weakness, SOB, GAUTHIER admitted to tele for acute respiratory distress and viral gastroenteritis. 72 female with PMHx of DM2, Pulm HTN, end stage ILD followed closely with Pulm @ [facility] Dr. Barajas p/w diarrhea x 1 week and worsening weakness, SOB, GAUTHIER admitted to tele for acute respiratory distress and viral gastroenteritis.

## 2017-05-03 NOTE — PROGRESS NOTE ADULT - PROBLEM SELECTOR PLAN 5
-cont HISS, accuchecks, diabetic/dash diet, hypoglycemia protocol type 2  -cont HISS, accuchecks, diabetic/dash diet, hypoglycemia protocol

## 2017-05-03 NOTE — PROGRESS NOTE ADULT - SUBJECTIVE AND OBJECTIVE BOX
Date/Time Patient Seen:  		  Referring MD:   Data Reviewed	       Patient is a 72y old  Female who presents with a chief complaint of shortness of breath/abdominal pain (03 May 2017 00:37)  in bed  seen and examined  off BIPAP  anxious  on IVF  alert        Subjective/HPI       Medication list         MEDICATIONS  (STANDING):  buDESOnide  180 MICROgram(s) Inhaler 1Puff(s) Inhalation daily  ALBUTerol    0.083% 2.5milliGRAM(s) Nebulizer every 8 hours  predniSONE   Tablet 20milliGRAM(s) Oral daily  spironolactone 25milliGRAM(s) Oral daily  simvastatin 20milliGRAM(s) Oral at bedtime  pantoprazole    Tablet 40milliGRAM(s) Oral before breakfast  enoxaparin Injectable 40milliGRAM(s) SubCutaneous daily  sodium chloride 0.9%. 1000milliLiter(s) IV Continuous <Continuous>  insulin lispro (HumaLOG) corrective regimen sliding scale  SubCutaneous three times a day before meals  insulin lispro (HumaLOG) corrective regimen sliding scale  SubCutaneous at bedtime  dextrose 5%. 1000milliLiter(s) IV Continuous <Continuous>  dextrose 50% Injectable 12.5Gram(s) IV Push once  dextrose 50% Injectable 25Gram(s) IV Push once  dextrose 50% Injectable 25Gram(s) IV Push once  mycophenolate mofetil 1000milliGRAM(s) Oral two times a day    MEDICATIONS  (PRN):  LORazepam   Injectable 0.5milliGRAM(s) IV Push every 4 hours PRN anxiety, distress, resp distress, agitation  dextrose Gel 1Dose(s) Oral once PRN Blood Glucose LESS THAN 70 milliGRAM(s)/deciliter  glucagon  Injectable 1milliGRAM(s) IntraMuscular once PRN Glucose LESS THAN 70 milligrams/deciliter         Vitals log        ICU Vital Signs Last 24 Hrs  T(C): 36.3, Max: 37.3 (05-02 @ 15:28)  T(F): 97.4, Max: 99.2 (05-02 @ 15:28)  HR: 98 (96 - 123)  BP: 102/58 (93/56 - 120/82)  BP(mean): --  ABP: --  ABP(mean): --  RR: 19 (16 - 20)  SpO2: 94% (88% - 99%)           Input and Output:  I&O's Detail      Lab Data                        14.4   13.3  )-----------( 337      ( 02 May 2017 15:30 )             45.6     05-02    135  |  94<L>  |  16  ----------------------------<  177<H>  4.0   |  32<H>  |  0.57    Ca    9.2      02 May 2017 15:30    TPro  7.5  /  Alb  3.9  /  TBili  0.5  /  DBili  x   /  AST  15  /  ALT  20  /  AlkPhos  111  05-02    ABG - ( 02 May 2017 17:31 )  pH: 7.35  /  pCO2: 48    /  pO2: 62    / HCO3: 24    / Base Excess: 0.4   /  SaO2: 90                      Review of Systems	      Objective     Physical Examination  heart - s1s2  kyphosis  lungs - dec BS  no wheezing  abd - soft          Pertinent Lab findings & Imaging      [place]:  NO   Adequate UO     I&O's Detail           Discussed with:     Cultures:	        Radiology

## 2017-05-03 NOTE — PROGRESS NOTE ADULT - PROBLEM SELECTOR PLAN 1
-resolved; tolerated BIPAP overnight  -Dr. Neil (pul) following; f/u recs  -cont supp O2 and encourage BIPAP use for resp distress, keep O2 sats >86%  -cont Albuterol, prednisone, pulmicort  -f/u BCx and UCx  -cont ativan PRN for resp distress and anxiety  -monitor continuous pulse ox -acute on chronic  -resolved; tolerated BIPAP overnight  -Dr. Neil (pul) following; f/u recs  -cont supp O2 and encourage BIPAP use for resp distress, keep O2 sats >86%  -cont Albuterol, prednisone, pulmicort  -f/u BCx and UCx  -cont ativan PRN for resp distress and anxiety  -monitor continuous pulse ox

## 2017-05-04 DIAGNOSIS — E87.6 HYPOKALEMIA: ICD-10-CM

## 2017-05-04 LAB
-  AMPICILLIN: SIGNIFICANT CHANGE UP
-  CIPROFLOXACIN: SIGNIFICANT CHANGE UP
-  NITROFURANTOIN: SIGNIFICANT CHANGE UP
-  TETRACYCLINE: SIGNIFICANT CHANGE UP
-  VANCOMYCIN: SIGNIFICANT CHANGE UP
ANION GAP SERPL CALC-SCNC: 10 MMOL/L — SIGNIFICANT CHANGE UP (ref 5–17)
ANION GAP SERPL CALC-SCNC: 6 MMOL/L — SIGNIFICANT CHANGE UP (ref 5–17)
BASOPHILS # BLD AUTO: 0 K/UL — SIGNIFICANT CHANGE UP (ref 0–0.2)
BASOPHILS NFR BLD AUTO: 0.5 % — SIGNIFICANT CHANGE UP (ref 0–2)
BUN SERPL-MCNC: 14 MG/DL — SIGNIFICANT CHANGE UP (ref 7–23)
BUN SERPL-MCNC: 16 MG/DL — SIGNIFICANT CHANGE UP (ref 7–23)
CALCIUM SERPL-MCNC: 8.3 MG/DL — LOW (ref 8.5–10.1)
CALCIUM SERPL-MCNC: 8.4 MG/DL — LOW (ref 8.5–10.1)
CHLORIDE SERPL-SCNC: 106 MMOL/L — SIGNIFICANT CHANGE UP (ref 96–108)
CHLORIDE SERPL-SCNC: 107 MMOL/L — SIGNIFICANT CHANGE UP (ref 96–108)
CO2 SERPL-SCNC: 30 MMOL/L — SIGNIFICANT CHANGE UP (ref 22–31)
CO2 SERPL-SCNC: 30 MMOL/L — SIGNIFICANT CHANGE UP (ref 22–31)
CREAT SERPL-MCNC: 0.5 MG/DL — SIGNIFICANT CHANGE UP (ref 0.5–1.3)
CREAT SERPL-MCNC: 0.57 MG/DL — SIGNIFICANT CHANGE UP (ref 0.5–1.3)
CULTURE RESULTS: SIGNIFICANT CHANGE UP
EOSINOPHIL # BLD AUTO: 0.2 K/UL — SIGNIFICANT CHANGE UP (ref 0–0.5)
EOSINOPHIL NFR BLD AUTO: 1.8 % — SIGNIFICANT CHANGE UP (ref 0–6)
GLUCOSE SERPL-MCNC: 120 MG/DL — HIGH (ref 70–99)
GLUCOSE SERPL-MCNC: 137 MG/DL — HIGH (ref 70–99)
HCT VFR BLD CALC: 35 % — SIGNIFICANT CHANGE UP (ref 34.5–45)
HGB BLD-MCNC: 11.1 G/DL — LOW (ref 11.5–15.5)
LYMPHOCYTES # BLD AUTO: 1.4 K/UL — SIGNIFICANT CHANGE UP (ref 1–3.3)
LYMPHOCYTES # BLD AUTO: 15.6 % — SIGNIFICANT CHANGE UP (ref 13–44)
MAGNESIUM SERPL-MCNC: 1.5 MG/DL — LOW (ref 1.8–2.4)
MCHC RBC-ENTMCNC: 31.4 PG — SIGNIFICANT CHANGE UP (ref 27–34)
MCHC RBC-ENTMCNC: 31.6 GM/DL — LOW (ref 32–36)
MCV RBC AUTO: 99.4 FL — SIGNIFICANT CHANGE UP (ref 80–100)
METHOD TYPE: SIGNIFICANT CHANGE UP
MONOCYTES # BLD AUTO: 0.5 K/UL — SIGNIFICANT CHANGE UP (ref 0–0.9)
MONOCYTES NFR BLD AUTO: 5.3 % — SIGNIFICANT CHANGE UP (ref 1–9)
NEUTROPHILS # BLD AUTO: 7.1 K/UL — SIGNIFICANT CHANGE UP (ref 1.8–7.4)
NEUTROPHILS NFR BLD AUTO: 76.8 % — SIGNIFICANT CHANGE UP (ref 43–77)
ORGANISM # SPEC MICROSCOPIC CNT: SIGNIFICANT CHANGE UP
ORGANISM # SPEC MICROSCOPIC CNT: SIGNIFICANT CHANGE UP
PHOSPHATE SERPL-MCNC: 2.5 MG/DL — SIGNIFICANT CHANGE UP (ref 2.5–4.5)
PLATELET # BLD AUTO: 276 K/UL — SIGNIFICANT CHANGE UP (ref 150–400)
POTASSIUM SERPL-MCNC: 2.9 MMOL/L — CRITICAL LOW (ref 3.5–5.3)
POTASSIUM SERPL-MCNC: 4 MMOL/L — SIGNIFICANT CHANGE UP (ref 3.5–5.3)
POTASSIUM SERPL-SCNC: 2.9 MMOL/L — CRITICAL LOW (ref 3.5–5.3)
POTASSIUM SERPL-SCNC: 4 MMOL/L — SIGNIFICANT CHANGE UP (ref 3.5–5.3)
RBC # BLD: 3.52 M/UL — LOW (ref 3.8–5.2)
RBC # FLD: 13.8 % — SIGNIFICANT CHANGE UP (ref 10.3–14.5)
SODIUM SERPL-SCNC: 143 MMOL/L — SIGNIFICANT CHANGE UP (ref 135–145)
SODIUM SERPL-SCNC: 146 MMOL/L — HIGH (ref 135–145)
SPECIMEN SOURCE: SIGNIFICANT CHANGE UP
WBC # BLD: 9.2 K/UL — SIGNIFICANT CHANGE UP (ref 3.8–10.5)
WBC # FLD AUTO: 9.2 K/UL — SIGNIFICANT CHANGE UP (ref 3.8–10.5)

## 2017-05-04 PROCEDURE — 99233 SBSQ HOSP IP/OBS HIGH 50: CPT

## 2017-05-04 RX ORDER — POTASSIUM CHLORIDE 20 MEQ
40 PACKET (EA) ORAL EVERY 4 HOURS
Qty: 0 | Refills: 0 | Status: COMPLETED | OUTPATIENT
Start: 2017-05-04 | End: 2017-05-04

## 2017-05-04 RX ORDER — MAGNESIUM SULFATE 500 MG/ML
1 VIAL (ML) INJECTION ONCE
Qty: 0 | Refills: 0 | Status: COMPLETED | OUTPATIENT
Start: 2017-05-04 | End: 2017-05-04

## 2017-05-04 RX ORDER — OMEPRAZOLE 10 MG/1
40 CAPSULE, DELAYED RELEASE ORAL
Qty: 0 | Refills: 0 | Status: DISCONTINUED | OUTPATIENT
Start: 2017-05-05 | End: 2017-05-07

## 2017-05-04 RX ADMIN — TREPROSTINIL 18 MICROGRAM(S): 48 INHALANT ORAL at 11:51

## 2017-05-04 RX ADMIN — Medication 1 PUFF(S): at 21:12

## 2017-05-04 RX ADMIN — Medication 40 MILLIEQUIVALENT(S): at 17:19

## 2017-05-04 RX ADMIN — SPIRONOLACTONE 25 MILLIGRAM(S): 25 TABLET, FILM COATED ORAL at 06:35

## 2017-05-04 RX ADMIN — ALBUTEROL 2.5 MILLIGRAM(S): 90 AEROSOL, METERED ORAL at 22:26

## 2017-05-04 RX ADMIN — Medication 100 GRAM(S): at 10:44

## 2017-05-04 RX ADMIN — Medication 20 MILLIGRAM(S): at 06:35

## 2017-05-04 RX ADMIN — Medication 2: at 11:50

## 2017-05-04 RX ADMIN — ALBUTEROL 2.5 MILLIGRAM(S): 90 AEROSOL, METERED ORAL at 15:35

## 2017-05-04 RX ADMIN — TREPROSTINIL 18 MICROGRAM(S): 48 INHALANT ORAL at 08:00

## 2017-05-04 RX ADMIN — ENOXAPARIN SODIUM 40 MILLIGRAM(S): 100 INJECTION SUBCUTANEOUS at 11:50

## 2017-05-04 RX ADMIN — Medication 40 MILLIEQUIVALENT(S): at 13:16

## 2017-05-04 RX ADMIN — MYCOPHENOLATE MOFETIL 1000 MILLIGRAM(S): 250 CAPSULE ORAL at 17:19

## 2017-05-04 RX ADMIN — MYCOPHENOLATE MOFETIL 1000 MILLIGRAM(S): 250 CAPSULE ORAL at 06:35

## 2017-05-04 RX ADMIN — Medication 1 PUFF(S): at 07:58

## 2017-05-04 RX ADMIN — TREPROSTINIL 18 MICROGRAM(S): 48 INHALANT ORAL at 15:01

## 2017-05-04 RX ADMIN — SIMVASTATIN 20 MILLIGRAM(S): 20 TABLET, FILM COATED ORAL at 21:03

## 2017-05-04 RX ADMIN — Medication 0.25 MILLIGRAM(S): at 09:21

## 2017-05-04 RX ADMIN — ALBUTEROL 2.5 MILLIGRAM(S): 90 AEROSOL, METERED ORAL at 08:38

## 2017-05-04 NOTE — PROGRESS NOTE ADULT - PROBLEM SELECTOR PLAN 1
ILD  severe ILD  bronchodilators  inhalers  o2 support  BIPAP prn and Overnight  keep sat > 86 pct  incentive rupesh

## 2017-05-04 NOTE — PROGRESS NOTE ADULT - SUBJECTIVE AND OBJECTIVE BOX
Date/Time Patient Seen:  		  Referring MD:   Data Reviewed	       Patient is a 72y old  Female who presents with a chief complaint of shortness of breath/abdominal pain (03 May 2017 00:37)  in bed  using BIPAP overnight  vs and meds reviviewed      Subjective/HPI       Medication list         MEDICATIONS  (STANDING):  buDESOnide  180 MICROgram(s) Inhaler 1Puff(s) Inhalation two times a day  ALBUTerol    0.083% 2.5milliGRAM(s) Nebulizer every 8 hours  predniSONE   Tablet 20milliGRAM(s) Oral daily  spironolactone 25milliGRAM(s) Oral daily  simvastatin 20milliGRAM(s) Oral at bedtime  pantoprazole    Tablet 40milliGRAM(s) Oral before breakfast  enoxaparin Injectable 40milliGRAM(s) SubCutaneous daily  insulin lispro (HumaLOG) corrective regimen sliding scale  SubCutaneous three times a day before meals  insulin lispro (HumaLOG) corrective regimen sliding scale  SubCutaneous at bedtime  dextrose 5%. 1000milliLiter(s) IV Continuous <Continuous>  dextrose 50% Injectable 12.5Gram(s) IV Push once  dextrose 50% Injectable 25Gram(s) IV Push once  dextrose 50% Injectable 25Gram(s) IV Push once  mycophenolate mofetil 1000milliGRAM(s) Oral two times a day  tadalafil 40milliGRAM(s) Oral daily  treprostinil Inhalation 18MICROGram(s) Inhalation four times a day    MEDICATIONS  (PRN):  LORazepam   Injectable 0.5milliGRAM(s) IV Push every 4 hours PRN anxiety, distress, resp distress, agitation  dextrose Gel 1Dose(s) Oral once PRN Blood Glucose LESS THAN 70 milliGRAM(s)/deciliter  glucagon  Injectable 1milliGRAM(s) IntraMuscular once PRN Glucose LESS THAN 70 milligrams/deciliter  ALPRAZolam 0.25milliGRAM(s) Oral daily PRN anxiety  acetaminophen   Tablet. 650milliGRAM(s) Oral every 6 hours PRN Mild to Moderate Pain         Vitals log        ICU Vital Signs Last 24 Hrs  T(C): 36.6, Max: 36.6 (05-04 @ 05:09)  T(F): 97.9, Max: 97.9 (05-04 @ 05:09)  HR: 86 (86 - 107)  BP: 100/58 (94/53 - 113/74)  BP(mean): --  ABP: --  ABP(mean): --  RR: 22 (20 - 25)  SpO2: 95% (87% - 95%)           Input and Output:  I&O's Detail    I & Os for current day (as of 04 May 2017 06:15)  =============================================  IN:    Total IN: 0 ml  ---------------------------------------------  OUT:    Voided: 1 ml    Total OUT: 1 ml  ---------------------------------------------  Total NET: -1 ml      Lab Data                        11.4   9.3   )-----------( 278      ( 03 May 2017 07:16 )             36.7     05-03    146<H>  |  107  |  10  ----------------------------<  90  3.9   |  30  |  0.36<L>    Ca    8.6      03 May 2017 07:16    TPro  7.5  /  Alb  3.9  /  TBili  0.5  /  DBili  x   /  AST  15  /  ALT  20  /  AlkPhos  111  05-02    ABG - ( 02 May 2017 17:31 )  pH: 7.35  /  pCO2: 48    /  pO2: 62    / HCO3: 24    / Base Excess: 0.4   /  SaO2: 90                      Review of Systems	      Objective     Physical Examination  heart - s1s2  kyphosis  lungs - dec BS  abd - soft        Pertinent Lab findings & Imaging      Francie:  NO   Adequate UO     I&O's Detail    I & Os for current day (as of 04 May 2017 06:15)  =============================================  IN:    Total IN: 0 ml  ---------------------------------------------  OUT:    Voided: 1 ml    Total OUT: 1 ml  ---------------------------------------------  Total NET: -1 ml           Discussed with:     Cultures:	        Radiology

## 2017-05-04 NOTE — INPATIENT CERTIFICATION FOR MEDICARE PATIENTS - RISKS OF ADVERSE EVENTS
Concern for delay in diagnosis and treatment/Concern for worsening infectious process/Other:/Concern for cardiopulmonary deterioration

## 2017-05-04 NOTE — PROGRESS NOTE ADULT - SUBJECTIVE AND OBJECTIVE BOX
Patient is a 72y old  Female who presents with a chief complaint of shortness of breath/abdominal pain (03 May 2017 00:37)      INTERVAL HPI: Pt seen and examined. Still some loose stool but semi formed, sill sob with wob but better than yesterday. Pt concerned of diet.   OVERNIGHT EVENTS: None note  T(F): 97.7, Max: 97.9 (05-04 @ 05:09)  HR: 95 (86 - 107)  BP: 115/75 (94/53 - 115/75)  RR: 20 (20 - 22)  SpO2: 94% (87% - 95%)  Wt(kg): --  I&O's Summary  I & Os for 24h ending 04 May 2017 07:00  =============================================  IN: 0 ml / OUT: 1 ml / NET: -1 ml    I & Os for current day (as of 04 May 2017 15:00)  =============================================  IN: 220 ml / OUT: 0 ml / NET: 220 ml      REVIEW OF SYSTEMS:  CONSTITUTIONAL: No fever, weight loss, or fatigue  EYES: No eye pain, visual disturbances, or discharge  ENMT:  No difficulty hearing, tinnitus, vertigo; No sinus or throat pain  RESPIRATORY: +sob and wob  CARDIOVASCULAR: No chest pain, palpitations, dizziness, or leg swelling  GASTROINTESTINAL: No abdominal or epigastric pain. No nausea, vomiting, or hematemesis; No diarrhea or constipation. No melena or hematochezia.  GENITOURINARY: No dysuria, frequency, hematuria, or incontinence  NEUROLOGICAL: No headaches, memory loss, loss of strength, numbness, or tremors  SKIN: No itching, burning, rashes, or lesions   LYMPH NODES: No enlarged glands  ENDOCRINE: No heat or cold intolerance; No hair loss  MUSCULOSKELETAL: No joint pain or swelling; No muscle, back, or extremity pain  PSYCHIATRIC: No depression, anxiety, mood swings, or difficulty sleeping  HEME/LYMPH: No easy bruising, or bleeding gums  ALLERY AND IMMUNOLOGIC: No hives or eczema    PHYSICAL EXAM:  GENERAL: NAD, chronically ill appearing O2 dependent  HEAD:  Atraumatic, Normocephalic  EYES: EOMI, PERRLA, conjunctiva and sclera clear  ENMT: No tonsillar erythema, exudates, or enlargement; Moist mucous membranes, Good dentition, No lesions  NECK: Supple, No JVD, Normal thyroid  NERVOUS SYSTEM:  Alert & Oriented X3, Good concentration; Motor Strength 5/5 B/L upper and lower extremities; DTRs 2+ intact and symmetric  CHEST/LUNG: coarse sounds r>l  HEART: Regular rate and rhythm; No murmurs, rubs, or gallops  ABDOMEN: Soft, Nontender, Nondistended; Bowel sounds present  EXTREMITIES:  2+ Peripheral Pulses, No clubbing, cyanosis, or edema  LYMPH: No lymphadenopathy noted  SKIN: No rashes or lesions    LABS:                        11.1   9.2   )-----------( 276      ( 04 May 2017 07:05 )             35.0     05-04    146<H>  |  106  |  16  ----------------------------<  120<H>  2.9<LL>   |  30  |  0.57    Ca    8.3<L>      04 May 2017 07:05  Phos  2.5     05-04  Mg     1.5     05-04    TPro  7.5  /  Alb  3.9  /  TBili  0.5  /  DBili  x   /  AST  15  /  ALT  20  /  AlkPhos  111  05-02    PT/INR - ( 02 May 2017 15:30 )   PT: 12.2 sec;   INR: 1.12 ratio         PTT - ( 02 May 2017 15:30 )  PTT:29.0 sec  Urinalysis Basic - ( 02 May 2017 20:17 )    Color: Yellow / Appearance: Clear / S.005 / pH: x  Gluc: x / Ketone: Small  / Bili: Negative / Urobili: Negative   Blood: x / Protein: Negative / Nitrite: Negative   Leuk Esterase: Trace / RBC: 0-2 /HPF / WBC 0-2   Sq Epi: x / Non Sq Epi: Occasional / Bacteria: Occasional      CAPILLARY BLOOD GLUCOSE  202 (04 May 2017 11:49)  137 (04 May 2017 07:37)  141 (03 May 2017 21:00)  137 (03 May 2017 17:15)    ABG - ( 02 May 2017 17:31 )  pH: 7.35  /  pCO2: 48    /  pO2: 62    / HCO3: 24    / Base Excess: 0.4   /  SaO2: 90                 @ 00:06   10,000 - 49,000 CFU/mL Enterococcus faecalis  Normal Urogenital torres present  Revised urine counts reflect clinically significant counts  established by evidence based medicine.  --  --   @ 21:53   No growth to date.  --  --          MEDICATIONS  (STANDING):  buDESOnide  180 MICROgram(s) Inhaler 1Puff(s) Inhalation two times a day  ALBUTerol    0.083% 2.5milliGRAM(s) Nebulizer every 8 hours  predniSONE   Tablet 20milliGRAM(s) Oral daily  spironolactone 25milliGRAM(s) Oral daily  simvastatin 20milliGRAM(s) Oral at bedtime  enoxaparin Injectable 40milliGRAM(s) SubCutaneous daily  insulin lispro (HumaLOG) corrective regimen sliding scale  SubCutaneous three times a day before meals  insulin lispro (HumaLOG) corrective regimen sliding scale  SubCutaneous at bedtime  dextrose 5%. 1000milliLiter(s) IV Continuous <Continuous>  dextrose 50% Injectable 12.5Gram(s) IV Push once  dextrose 50% Injectable 25Gram(s) IV Push once  dextrose 50% Injectable 25Gram(s) IV Push once  mycophenolate mofetil 1000milliGRAM(s) Oral two times a day  tadalafil 40milliGRAM(s) Oral daily  treprostinil Inhalation 18MICROGram(s) Inhalation four times a day  potassium chloride    Tablet ER 40milliEquivalent(s) Oral every 4 hours    MEDICATIONS  (PRN):  LORazepam   Injectable 0.5milliGRAM(s) IV Push every 4 hours PRN anxiety, if PO not tolerated  dextrose Gel 1Dose(s) Oral once PRN Blood Glucose LESS THAN 70 milliGRAM(s)/deciliter  glucagon  Injectable 1milliGRAM(s) IntraMuscular once PRN Glucose LESS THAN 70 milligrams/deciliter  ALPRAZolam 0.25milliGRAM(s) Oral daily PRN anxiety  acetaminophen   Tablet. 650milliGRAM(s) Oral every 6 hours PRN Mild Pain (1 - 3)

## 2017-05-04 NOTE — PROGRESS NOTE ADULT - ASSESSMENT
72 female with PMHx of DM2, Pulm HTN, end stage ILD followed closely with Pulm @ [facility] Dr. Barajas p/w diarrhea x 1 week and worsening weakness, SOB, GAUTHIER admitted to tele for acute respiratory distress and viral gastroenteritis.

## 2017-05-04 NOTE — PROGRESS NOTE ADULT - PROBLEM SELECTOR PLAN 1
-acute on chronic, sec to ILD, improving  -resolved; refused BIPAP overnight  -Dr. Neil (pul) following; f/u recs  -cont supp O2 and encourage BIPAP use for resp distress, keep O2 sats >86%  -cont Albuterol, prednisone, pulmicort  -f/u BCx and UCx  -cont ativan PRN for resp distress and anxiety  -monitor continuous pulse ox

## 2017-05-05 DIAGNOSIS — E83.42 HYPOMAGNESEMIA: ICD-10-CM

## 2017-05-05 LAB
ALBUMIN SERPL ELPH-MCNC: 3.1 G/DL — LOW (ref 3.3–5)
ALP SERPL-CCNC: 82 U/L — SIGNIFICANT CHANGE UP (ref 40–120)
ALT FLD-CCNC: 19 U/L — SIGNIFICANT CHANGE UP (ref 12–78)
ANION GAP SERPL CALC-SCNC: 6 MMOL/L — SIGNIFICANT CHANGE UP (ref 5–17)
AST SERPL-CCNC: 17 U/L — SIGNIFICANT CHANGE UP (ref 15–37)
BASOPHILS NFR BLD AUTO: 0 % — SIGNIFICANT CHANGE UP (ref 0–2)
BILIRUB SERPL-MCNC: 0.3 MG/DL — SIGNIFICANT CHANGE UP (ref 0.2–1.2)
BUN SERPL-MCNC: 15 MG/DL — SIGNIFICANT CHANGE UP (ref 7–23)
CALCIUM SERPL-MCNC: 8.6 MG/DL — SIGNIFICANT CHANGE UP (ref 8.5–10.1)
CHLORIDE SERPL-SCNC: 107 MMOL/L — SIGNIFICANT CHANGE UP (ref 96–108)
CO2 SERPL-SCNC: 30 MMOL/L — SIGNIFICANT CHANGE UP (ref 22–31)
CREAT SERPL-MCNC: 0.48 MG/DL — LOW (ref 0.5–1.3)
EOSINOPHIL NFR BLD AUTO: 0 % — SIGNIFICANT CHANGE UP (ref 0–6)
GLUCOSE SERPL-MCNC: 155 MG/DL — HIGH (ref 70–99)
HCT VFR BLD CALC: 39.9 % — SIGNIFICANT CHANGE UP (ref 34.5–45)
HGB BLD-MCNC: 12.2 G/DL — SIGNIFICANT CHANGE UP (ref 11.5–15.5)
LYMPHOCYTES # BLD AUTO: 7 % — LOW (ref 13–44)
MAGNESIUM SERPL-MCNC: 1.7 MG/DL — LOW (ref 1.8–2.4)
MCHC RBC-ENTMCNC: 30.4 PG — SIGNIFICANT CHANGE UP (ref 27–34)
MCHC RBC-ENTMCNC: 30.6 GM/DL — LOW (ref 32–36)
MCV RBC AUTO: 99.6 FL — SIGNIFICANT CHANGE UP (ref 80–100)
MONOCYTES NFR BLD AUTO: 0 % — LOW (ref 1–9)
NEUTROPHILS NFR BLD AUTO: 94 % — HIGH (ref 43–77)
PLATELET # BLD AUTO: 333 K/UL — SIGNIFICANT CHANGE UP (ref 150–400)
POTASSIUM SERPL-MCNC: 4.3 MMOL/L — SIGNIFICANT CHANGE UP (ref 3.5–5.3)
POTASSIUM SERPL-SCNC: 4.3 MMOL/L — SIGNIFICANT CHANGE UP (ref 3.5–5.3)
PROT SERPL-MCNC: 5.9 G/DL — LOW (ref 6–8.3)
RBC # BLD: 4.01 M/UL — SIGNIFICANT CHANGE UP (ref 3.8–5.2)
RBC # FLD: 13.6 % — SIGNIFICANT CHANGE UP (ref 10.3–14.5)
SODIUM SERPL-SCNC: 143 MMOL/L — SIGNIFICANT CHANGE UP (ref 135–145)
WBC # BLD: 12.6 K/UL — HIGH (ref 3.8–10.5)
WBC # FLD AUTO: 12.6 K/UL — HIGH (ref 3.8–10.5)

## 2017-05-05 PROCEDURE — 99233 SBSQ HOSP IP/OBS HIGH 50: CPT

## 2017-05-05 RX ORDER — MAGNESIUM SULFATE 500 MG/ML
1 VIAL (ML) INJECTION ONCE
Qty: 0 | Refills: 0 | Status: COMPLETED | OUTPATIENT
Start: 2017-05-05 | End: 2017-05-05

## 2017-05-05 RX ADMIN — TREPROSTINIL 18 MICROGRAM(S): 48 INHALANT ORAL at 07:56

## 2017-05-05 RX ADMIN — Medication 100 GRAM(S): at 12:43

## 2017-05-05 RX ADMIN — ENOXAPARIN SODIUM 40 MILLIGRAM(S): 100 INJECTION SUBCUTANEOUS at 11:34

## 2017-05-05 RX ADMIN — MYCOPHENOLATE MOFETIL 1000 MILLIGRAM(S): 250 CAPSULE ORAL at 05:48

## 2017-05-05 RX ADMIN — Medication 20 MILLIGRAM(S): at 05:48

## 2017-05-05 RX ADMIN — Medication 1 PUFF(S): at 05:48

## 2017-05-05 RX ADMIN — Medication 1 PUFF(S): at 21:57

## 2017-05-05 RX ADMIN — SIMVASTATIN 20 MILLIGRAM(S): 20 TABLET, FILM COATED ORAL at 21:57

## 2017-05-05 RX ADMIN — Medication 2: at 11:53

## 2017-05-05 RX ADMIN — OMEPRAZOLE 40 MILLIGRAM(S): 10 CAPSULE, DELAYED RELEASE ORAL at 05:48

## 2017-05-05 RX ADMIN — ALBUTEROL 2.5 MILLIGRAM(S): 90 AEROSOL, METERED ORAL at 14:58

## 2017-05-05 RX ADMIN — TREPROSTINIL 18 MICROGRAM(S): 48 INHALANT ORAL at 16:30

## 2017-05-05 RX ADMIN — TREPROSTINIL 18 MICROGRAM(S): 48 INHALANT ORAL at 12:50

## 2017-05-05 RX ADMIN — Medication 0.25 MILLIGRAM(S): at 11:40

## 2017-05-05 RX ADMIN — Medication 650 MILLIGRAM(S): at 07:10

## 2017-05-05 RX ADMIN — Medication 650 MILLIGRAM(S): at 06:10

## 2017-05-05 RX ADMIN — MYCOPHENOLATE MOFETIL 1000 MILLIGRAM(S): 250 CAPSULE ORAL at 17:12

## 2017-05-05 RX ADMIN — ALBUTEROL 2.5 MILLIGRAM(S): 90 AEROSOL, METERED ORAL at 22:59

## 2017-05-05 RX ADMIN — TADALAFIL 40 MILLIGRAM(S): 10 TABLET, FILM COATED ORAL at 10:07

## 2017-05-05 RX ADMIN — ALBUTEROL 2.5 MILLIGRAM(S): 90 AEROSOL, METERED ORAL at 08:17

## 2017-05-05 NOTE — PROGRESS NOTE ADULT - ASSESSMENT
72 female with PMHx of DM2, Pulm HTN, end stage ILD followed closely with Pulm @ [facility] Dr. Barajas p/w diarrhea x 1 week and worsening weakness, SOB, GAUTHIER admitted to tele for acute respiratory distress and viral gastroenteritis.

## 2017-05-05 NOTE — PHYSICAL THERAPY INITIAL EVALUATION ADULT - ADDITIONAL COMMENTS
Pt lives in a house with her spouse, dtr and dtr's family, no steps. Pt ambulates minimal distances with RW and spouse able to assist with ADLs as needed. Pt has  chair for longer distances and uses home O2 24/7.

## 2017-05-05 NOTE — PROGRESS NOTE ADULT - PROBLEM SELECTOR PLAN 1
ILD  end stage ILD  bronchodilators  Inhalers  keep sat > 86 pct  anxiolytics  mobilize  incentive rupesh  BIPAP prn  dc planning  overall much better

## 2017-05-05 NOTE — PROGRESS NOTE ADULT - SUBJECTIVE AND OBJECTIVE BOX
Patient is a 72y old  Female who presents with a chief complaint of shortness of breath/abdominal pain (03 May 2017 00:37)      INTERVAL HPI: Pt seen and examined. States she has had some more bms and still feels weak with worsening anxiety.  OVERNIGHT EVENTS: None noted  T(F): 97.6, Max: 97.9 (05-05 @ 04:25)  HR: 111 (68 - 111)  BP: 104/64 (87/62 - 110/56)  RR: 20 (19 - 22)  SpO2: 93% (93% - 100%)  Wt(kg): --  I&O's Summary    I & Os for current day (as of 05 May 2017 17:54)  =============================================  IN: 220 ml / OUT: 0 ml / NET: 220 ml      REVIEW OF SYSTEMS:  CONSTITUTIONAL: No fever, weight loss, or fatigue  EYES: No eye pain, visual disturbances, or discharge  RESPIRATORY: + sob  CARDIOVASCULAR: No chest pain, palpitations, dizziness, or leg swelling  GASTROINTESTINAL: No abdominal or epigastric pain. No nausea, vomiting, or hematemesis; No diarrhea or constipation. No melena or hematochezia.  GENITOURINARY: No dysuria, frequency, hematuria, or incontinence  NEUROLOGICAL: No headaches, memory loss, loss of strength, numbness, or tremors  SKIN: No itching, burning, rashes, or lesions   LYMPH NODES: No enlarged glands  ENDOCRINE: No heat or cold intolerance; No hair loss  MUSCULOSKELETAL: No joint pain or swelling; No muscle, back, or extremity pain  PSYCHIATRIC: No depression, anxiety, mood swings, or difficulty sleeping  HEME/LYMPH: No easy bruising, or bleeding gums  ALLERY AND IMMUNOLOGIC: No hives or eczema    PHYSICAL EXAM:  GENERAL: anxious, chronically ill appearing  HEAD:  Atraumatic, Normocephalic  EYES: EOMI, PERRLA, conjunctiva and sclera clear  ENMT: No tonsillar erythema, exudates, or enlargement; Moist mucous membranes, Good dentition, No lesions  NECK: Supple, No JVD, Normal thyroid  NERVOUS SYSTEM:  Alert & Oriented X3, Good concentration; Motor Strength 3/5 B/L upper and lower extremities; DTRs 2+ intact and symmetric  CHEST/LUNG: rhonchorous sounds likely from upper airway,. diminished bases  HEART: Regular rate and rhythm; No murmurs, rubs, or gallops  ABDOMEN: Soft, Nontender, Nondistended; Bowel sounds present  EXTREMITIES:  2+ Peripheral Pulses, No clubbing, cyanosis, or edema  LYMPH: No lymphadenopathy noted  SKIN: No rashes or lesions    LABS:                        12.2   12.6  )-----------( 333      ( 05 May 2017 14:39 )             39.9     05-05    143  |  107  |  15  ----------------------------<  155<H>  4.3   |  30  |  0.48<L>    Ca    8.6      05 May 2017 14:39  Phos  2.5     05-04  Mg     1.7     05-05    TPro  5.9<L>  /  Alb  3.1<L>  /  TBili  0.3  /  DBili  x   /  AST  17  /  ALT  19  /  AlkPhos  82  05-05        CAPILLARY BLOOD GLUCOSE  133 (05 May 2017 16:51)  217 (05 May 2017 11:19)  130 (05 May 2017 07:39)  131 (04 May 2017 22:29)      05-03 @ 00:06   10,000 - 49,000 CFU/mL Enterococcus faecalis  Normal Urogenital torres present  Revised urine counts reflect clinically significant counts  established by evidence based medicine.  --  Enterococcus faecalis  05-02 @ 21:53   No growth to date.  --  --          MEDICATIONS  (STANDING):  buDESOnide  180 MICROgram(s) Inhaler 1Puff(s) Inhalation two times a day  ALBUTerol    0.083% 2.5milliGRAM(s) Nebulizer every 8 hours  predniSONE   Tablet 20milliGRAM(s) Oral daily  spironolactone 25milliGRAM(s) Oral daily  simvastatin 20milliGRAM(s) Oral at bedtime  enoxaparin Injectable 40milliGRAM(s) SubCutaneous daily  insulin lispro (HumaLOG) corrective regimen sliding scale  SubCutaneous three times a day before meals  insulin lispro (HumaLOG) corrective regimen sliding scale  SubCutaneous at bedtime  dextrose 5%. 1000milliLiter(s) IV Continuous <Continuous>  dextrose 50% Injectable 12.5Gram(s) IV Push once  dextrose 50% Injectable 25Gram(s) IV Push once  dextrose 50% Injectable 25Gram(s) IV Push once  mycophenolate mofetil 1000milliGRAM(s) Oral two times a day  tadalafil 40milliGRAM(s) Oral daily  treprostinil Inhalation 18MICROGram(s) Inhalation four times a day  omeprazole 40milliGRAM(s) Oral before breakfast    MEDICATIONS  (PRN):  LORazepam   Injectable 0.5milliGRAM(s) IV Push every 4 hours PRN anxiety, if PO not tolerated  dextrose Gel 1Dose(s) Oral once PRN Blood Glucose LESS THAN 70 milliGRAM(s)/deciliter  glucagon  Injectable 1milliGRAM(s) IntraMuscular once PRN Glucose LESS THAN 70 milligrams/deciliter  ALPRAZolam 0.25milliGRAM(s) Oral daily PRN anxiety

## 2017-05-05 NOTE — PHYSICAL THERAPY INITIAL EVALUATION ADULT - PERTINENT HX OF CURRENT PROBLEM, REHAB EVAL
71 y/o female adm 5/2 with diarrhea, SOB, and GAUTHIER. Pt admitted for GELY and viral gastroenteritis. Rapid RVP negative.

## 2017-05-05 NOTE — PROGRESS NOTE ADULT - SUBJECTIVE AND OBJECTIVE BOX
Date/Time Patient Seen:  		  Referring MD:   Data Reviewed	       Patient is a 72y old  Female who presents with a chief complaint of shortness of breath/abdominal pain (03 May 2017 00:37)  in bed  seen and examined  vs and meds reviewed  overall much better  did not need BIPAP overnight  stool is more formed      Subjective/HPI     PAST MEDICAL & SURGICAL HISTORY:  Chronic interstitial lung disease  Pulmonary hypertension  DM (diabetes mellitus): pulmonary htn  H/O abdominal hysterectomy: 2002        Medication list         MEDICATIONS  (STANDING):  buDESOnide  180 MICROgram(s) Inhaler 1Puff(s) Inhalation two times a day  ALBUTerol    0.083% 2.5milliGRAM(s) Nebulizer every 8 hours  predniSONE   Tablet 20milliGRAM(s) Oral daily  spironolactone 25milliGRAM(s) Oral daily  simvastatin 20milliGRAM(s) Oral at bedtime  enoxaparin Injectable 40milliGRAM(s) SubCutaneous daily  insulin lispro (HumaLOG) corrective regimen sliding scale  SubCutaneous three times a day before meals  insulin lispro (HumaLOG) corrective regimen sliding scale  SubCutaneous at bedtime  dextrose 5%. 1000milliLiter(s) IV Continuous <Continuous>  dextrose 50% Injectable 12.5Gram(s) IV Push once  dextrose 50% Injectable 25Gram(s) IV Push once  dextrose 50% Injectable 25Gram(s) IV Push once  mycophenolate mofetil 1000milliGRAM(s) Oral two times a day  tadalafil 40milliGRAM(s) Oral daily  treprostinil Inhalation 18MICROGram(s) Inhalation four times a day  omeprazole 40milliGRAM(s) Oral before breakfast    MEDICATIONS  (PRN):  LORazepam   Injectable 0.5milliGRAM(s) IV Push every 4 hours PRN anxiety, if PO not tolerated  dextrose Gel 1Dose(s) Oral once PRN Blood Glucose LESS THAN 70 milliGRAM(s)/deciliter  glucagon  Injectable 1milliGRAM(s) IntraMuscular once PRN Glucose LESS THAN 70 milligrams/deciliter  ALPRAZolam 0.25milliGRAM(s) Oral daily PRN anxiety         Vitals log        ICU Vital Signs Last 24 Hrs  T(C): 36.6, Max: 36.6 (05-05 @ 04:25)  T(F): 97.9, Max: 97.9 (05-05 @ 04:25)  HR: 68 (68 - 99)  BP: 87/62 (87/62 - 108/62)  BP(mean): --  ABP: --  ABP(mean): --  RR: 19 (19 - 22)  SpO2: 98% (94% - 100%)           Input and Output:  I&O's Detail    I & Os for current day (as of 05 May 2017 08:47)  =============================================  IN:    Oral Fluid: 120 ml    IV PiggyBack: 100 ml    Total IN: 220 ml  ---------------------------------------------  OUT:    Total OUT: 0 ml  ---------------------------------------------  Total NET: 220 ml      Lab Data                        11.1   9.2   )-----------( 276      ( 04 May 2017 07:05 )             35.0     05-04    143  |  107  |  14  ----------------------------<  137<H>  4.0   |  30  |  0.50    Ca    8.4<L>      04 May 2017 16:48  Phos  2.5     05-04  Mg     1.5     05-04              Review of Systems	      Objective     Physical Examination      heart - s1s2  lungs - dec BS  abd - soft  extr - no gross edema  kyphosis  moves all extr  cn grossly int        Pertinent Lab findings & Imaging      [place]:  NO   Adequate UO     I&O's Detail    I & Os for current day (as of 05 May 2017 08:47)  =============================================  IN:    Oral Fluid: 120 ml    IV PiggyBack: 100 ml    Total IN: 220 ml  ---------------------------------------------  OUT:    Total OUT: 0 ml  ---------------------------------------------  Total NET: 220 ml           Discussed with:     Cultures:	        Radiology

## 2017-05-05 NOTE — PROGRESS NOTE ADULT - PROBLEM SELECTOR PLAN 1
-acute on chronic, sec to ILD, improving slowly  -Dr. Neil (pul) following; f/u recs  -cont supp O2 and encourage BIPAP use for resp distress, keep O2 sats >86%  -cont Albuterol, prednisone, pulmicort  -f/u BCx and UCx  -cont ativan PRN for resp distress and anxiety  -monitor continuous pulse ox

## 2017-05-05 NOTE — PROGRESS NOTE ADULT - ATTENDING COMMENTS
72 female with PMHx of DM2, Pulm HTN, end stage ILD followed closely with Pulm @ [facility] Dr. Barajas p/w diarrhea x 1 week and worsening weakness, SOB, GAUTHIER admitted to tele for acute respiratory distress and viral gastroenteritis. Plan: apprec pulm recs, cont steroids and nebs, i/os, monitor clinical course
likely DC tomorrow, apprec psych recs for worsening acute on chronic anxiety and ?OCD, apprec PT eval and recs

## 2017-05-06 DIAGNOSIS — R06.02 SHORTNESS OF BREATH: ICD-10-CM

## 2017-05-06 LAB
ALBUMIN SERPL ELPH-MCNC: 2.6 G/DL — LOW (ref 3.3–5)
ALP SERPL-CCNC: 69 U/L — SIGNIFICANT CHANGE UP (ref 40–120)
ALT FLD-CCNC: 19 U/L — SIGNIFICANT CHANGE UP (ref 12–78)
ANION GAP SERPL CALC-SCNC: 7 MMOL/L — SIGNIFICANT CHANGE UP (ref 5–17)
AST SERPL-CCNC: 16 U/L — SIGNIFICANT CHANGE UP (ref 15–37)
BASOPHILS # BLD AUTO: 0.1 K/UL — SIGNIFICANT CHANGE UP (ref 0–0.2)
BASOPHILS NFR BLD AUTO: 1.1 % — SIGNIFICANT CHANGE UP (ref 0–2)
BILIRUB SERPL-MCNC: 0.3 MG/DL — SIGNIFICANT CHANGE UP (ref 0.2–1.2)
BUN SERPL-MCNC: 16 MG/DL — SIGNIFICANT CHANGE UP (ref 7–23)
CALCIUM SERPL-MCNC: 8.2 MG/DL — LOW (ref 8.5–10.1)
CHLORIDE SERPL-SCNC: 105 MMOL/L — SIGNIFICANT CHANGE UP (ref 96–108)
CO2 SERPL-SCNC: 30 MMOL/L — SIGNIFICANT CHANGE UP (ref 22–31)
CREAT SERPL-MCNC: 0.3 MG/DL — LOW (ref 0.5–1.3)
EOSINOPHIL # BLD AUTO: 0.2 K/UL — SIGNIFICANT CHANGE UP (ref 0–0.5)
EOSINOPHIL NFR BLD AUTO: 1.8 % — SIGNIFICANT CHANGE UP (ref 0–6)
GLUCOSE SERPL-MCNC: 102 MG/DL — HIGH (ref 70–99)
HCT VFR BLD CALC: 34.9 % — SIGNIFICANT CHANGE UP (ref 34.5–45)
HGB BLD-MCNC: 11.1 G/DL — LOW (ref 11.5–15.5)
LYMPHOCYTES # BLD AUTO: 1.3 K/UL — SIGNIFICANT CHANGE UP (ref 1–3.3)
LYMPHOCYTES # BLD AUTO: 14.6 % — SIGNIFICANT CHANGE UP (ref 13–44)
MCHC RBC-ENTMCNC: 31.8 GM/DL — LOW (ref 32–36)
MCHC RBC-ENTMCNC: 31.9 PG — SIGNIFICANT CHANGE UP (ref 27–34)
MCV RBC AUTO: 100.2 FL — HIGH (ref 80–100)
MONOCYTES # BLD AUTO: 0.6 K/UL — SIGNIFICANT CHANGE UP (ref 0–0.9)
MONOCYTES NFR BLD AUTO: 6.6 % — SIGNIFICANT CHANGE UP (ref 1–9)
NEUTROPHILS # BLD AUTO: 6.7 K/UL — SIGNIFICANT CHANGE UP (ref 1.8–7.4)
NEUTROPHILS NFR BLD AUTO: 75.8 % — SIGNIFICANT CHANGE UP (ref 43–77)
PLATELET # BLD AUTO: 284 K/UL — SIGNIFICANT CHANGE UP (ref 150–400)
POTASSIUM SERPL-MCNC: 3.4 MMOL/L — LOW (ref 3.5–5.3)
POTASSIUM SERPL-SCNC: 3.4 MMOL/L — LOW (ref 3.5–5.3)
PROT SERPL-MCNC: 5 G/DL — LOW (ref 6–8.3)
RBC # BLD: 3.48 M/UL — LOW (ref 3.8–5.2)
RBC # FLD: 14.2 % — SIGNIFICANT CHANGE UP (ref 10.3–14.5)
SODIUM SERPL-SCNC: 142 MMOL/L — SIGNIFICANT CHANGE UP (ref 135–145)
WBC # BLD: 8.9 K/UL — SIGNIFICANT CHANGE UP (ref 3.8–10.5)
WBC # FLD AUTO: 8.9 K/UL — SIGNIFICANT CHANGE UP (ref 3.8–10.5)

## 2017-05-06 PROCEDURE — 99232 SBSQ HOSP IP/OBS MODERATE 35: CPT

## 2017-05-06 RX ORDER — POTASSIUM CHLORIDE 20 MEQ
40 PACKET (EA) ORAL ONCE
Qty: 0 | Refills: 0 | Status: COMPLETED | OUTPATIENT
Start: 2017-05-06 | End: 2017-05-06

## 2017-05-06 RX ADMIN — TREPROSTINIL 18 MICROGRAM(S): 48 INHALANT ORAL at 15:56

## 2017-05-06 RX ADMIN — MYCOPHENOLATE MOFETIL 1000 MILLIGRAM(S): 250 CAPSULE ORAL at 05:32

## 2017-05-06 RX ADMIN — MYCOPHENOLATE MOFETIL 1000 MILLIGRAM(S): 250 CAPSULE ORAL at 20:29

## 2017-05-06 RX ADMIN — ALBUTEROL 2.5 MILLIGRAM(S): 90 AEROSOL, METERED ORAL at 08:15

## 2017-05-06 RX ADMIN — Medication 1: at 16:54

## 2017-05-06 RX ADMIN — SIMVASTATIN 20 MILLIGRAM(S): 20 TABLET, FILM COATED ORAL at 21:26

## 2017-05-06 RX ADMIN — ENOXAPARIN SODIUM 40 MILLIGRAM(S): 100 INJECTION SUBCUTANEOUS at 11:45

## 2017-05-06 RX ADMIN — TADALAFIL 40 MILLIGRAM(S): 10 TABLET, FILM COATED ORAL at 08:01

## 2017-05-06 RX ADMIN — Medication 1: at 12:19

## 2017-05-06 RX ADMIN — Medication 40 MILLIEQUIVALENT(S): at 11:45

## 2017-05-06 RX ADMIN — OMEPRAZOLE 40 MILLIGRAM(S): 10 CAPSULE, DELAYED RELEASE ORAL at 05:32

## 2017-05-06 RX ADMIN — TREPROSTINIL 18 MICROGRAM(S): 48 INHALANT ORAL at 20:31

## 2017-05-06 RX ADMIN — Medication 20 MILLIGRAM(S): at 05:32

## 2017-05-06 RX ADMIN — Medication 1 PUFF(S): at 05:32

## 2017-05-06 RX ADMIN — Medication 0.25 MILLIGRAM(S): at 09:22

## 2017-05-06 RX ADMIN — Medication 1 PUFF(S): at 20:38

## 2017-05-06 RX ADMIN — ALBUTEROL 2.5 MILLIGRAM(S): 90 AEROSOL, METERED ORAL at 23:27

## 2017-05-06 RX ADMIN — TREPROSTINIL 18 MICROGRAM(S): 48 INHALANT ORAL at 13:04

## 2017-05-06 RX ADMIN — TREPROSTINIL 18 MICROGRAM(S): 48 INHALANT ORAL at 06:50

## 2017-05-06 NOTE — PROGRESS NOTE ADULT - SUBJECTIVE AND OBJECTIVE BOX
Patient is a 72y old  Female who presents with a chief complaint of shortness of breath/abdominal pain (03 May 2017 00:37)      INTERVAL HPI/OVERNIGHT EVENTS: patient feels better, still complains of having diarrhea, 2 episode loose stool, denies pain. she has mild sob.     MEDICATIONS  (STANDING):  buDESOnide  180 MICROgram(s) Inhaler 1Puff(s) Inhalation two times a day  ALBUTerol    0.083% 2.5milliGRAM(s) Nebulizer every 8 hours  predniSONE   Tablet 20milliGRAM(s) Oral daily  spironolactone 25milliGRAM(s) Oral daily  simvastatin 20milliGRAM(s) Oral at bedtime  enoxaparin Injectable 40milliGRAM(s) SubCutaneous daily  insulin lispro (HumaLOG) corrective regimen sliding scale  SubCutaneous three times a day before meals  insulin lispro (HumaLOG) corrective regimen sliding scale  SubCutaneous at bedtime  dextrose 5%. 1000milliLiter(s) IV Continuous <Continuous>  dextrose 50% Injectable 12.5Gram(s) IV Push once  dextrose 50% Injectable 25Gram(s) IV Push once  dextrose 50% Injectable 25Gram(s) IV Push once  mycophenolate mofetil 1000milliGRAM(s) Oral two times a day  tadalafil 40milliGRAM(s) Oral daily  treprostinil Inhalation 18MICROGram(s) Inhalation four times a day  omeprazole 40milliGRAM(s) Oral before breakfast    MEDICATIONS  (PRN):  LORazepam   Injectable 0.5milliGRAM(s) IV Push every 4 hours PRN anxiety, if PO not tolerated  dextrose Gel 1Dose(s) Oral once PRN Blood Glucose LESS THAN 70 milliGRAM(s)/deciliter  glucagon  Injectable 1milliGRAM(s) IntraMuscular once PRN Glucose LESS THAN 70 milligrams/deciliter  ALPRAZolam 0.25milliGRAM(s) Oral daily PRN anxiety      Allergies    No Known Allergies    Intolerances        REVIEW OF SYSTEMS:  CONSTITUTIONAL: No fever, weight loss, or fatigue  EYES: No eye pain, visual disturbances, or discharge  ENMT:  No difficulty hearing, tinnitus, vertigo; No sinus or throat pain  NECK: No pain or stiffness  BREASTS: No pain, masses, or nipple discharge  RESPIRATORY: No cough, wheezing, chills or hemoptysis; No shortness of breath  CARDIOVASCULAR: No chest pain, palpitations, dizziness, or leg swelling  GASTROINTESTINAL: No abdominal or epigastric pain. No nausea, vomiting, or hematemesis; No diarrhea or constipation. No melena or hematochezia.  GENITOURINARY: No dysuria, frequency, hematuria, or incontinence  NEUROLOGICAL: No headaches, memory loss, loss of strength, numbness, or tremors  SKIN: No itching, burning, rashes, or lesions   LYMPH NODES: No enlarged glands  ENDOCRINE: No heat or cold intolerance; No hair loss; No polydipsia or polyuria  MUSCULOSKELETAL: No joint pain or swelling; No muscle, back, or extremity pain  PSYCHIATRIC: No depression, anxiety, mood swings, or difficulty sleeping  HEME/LYMPH: No easy bruising, or bleeding gums  ALLERGY AND IMMUNOLOGIC: No hives or eczema    Vital Signs Last 24 Hrs  T(C): 36.5, Max: 36.7 (05-06 @ 04:41)  T(F): 97.7, Max: 98.1 (05-06 @ 04:41)  HR: 102 (89 - 115)  BP: 100/63 (90/56 - 120/80)  BP(mean): --  RR: 24 (21 - 44)  SpO2: 92% (91% - 100%)    PHYSICAL EXAM:  GENERAL: NAD, well-groomed, well-developed  HEAD:  Atraumatic, Normocephalic  EYES: EOMI, PERRLA, conjunctiva and sclera clear  ENMT: No tonsillar erythema, exudates, or enlargement; Moist mucous membranes, Good dentition, No lesions  NECK: Supple, No JVD, Normal thyroid  NERVOUS SYSTEM:  Alert & Oriented X3, Good concentration; Motor Strength 5/5 B/L upper and lower extremities; DTRs 2+ intact and symmetric  CHEST/LUNG: bibasilar rales, negative for rhonchi, wheezing, or rubs  HEART: Regular rate and rhythm; No murmurs, rubs, or gallops  ABDOMEN: Soft, Nontender, Nondistended; Bowel sounds present  EXTREMITIES:  2+ Peripheral Pulses, No clubbing, cyanosis, or edema  LYMPH: No lymphadenopathy noted  SKIN: No rashes or lesions    LABS:                        11.1   8.9   )-----------( 284      ( 06 May 2017 06:04 )             34.9     06 May 2017 06:04    142    |  105    |  16     ----------------------------<  102    3.4     |  30     |  0.30     Ca    8.2        06 May 2017 06:04  Mg     1.8       06 May 2017 06:04    TPro  5.0    /  Alb  2.6    /  TBili  0.3    /  DBili  x      /  AST  16     /  ALT  19     /  AlkPhos  69     06 May 2017 06:04        CAPILLARY BLOOD GLUCOSE  152 (06 May 2017 16:24)  182 (06 May 2017 12:15)  116 (06 May 2017 07:17)  110 (05 May 2017 22:50)      RADIOLOGY & ADDITIONAL TESTS:    Imaging Personally Reviewed:  [ ] YES  [ ] NO    Consultant(s) Notes Reviewed:  [x ] YES  [ ] NO    Care Discussed with Consultants/Other Providers [x ] YES  [ ] NO

## 2017-05-06 NOTE — PROGRESS NOTE ADULT - PROBLEM SELECTOR PLAN 8
corrected with po Kcl. monitor k level.
resolved  sec to gastroenteritis and decreased po  replete with kcl tabs and magnesium for hypomagnesemia  recheck bmp  replete prn
sec to gastroenteritis and decreased po  replete with kcl tabs and magnesium for hypomagnesemia  recheck bmp  replete prn

## 2017-05-06 NOTE — PROGRESS NOTE ADULT - PROBLEM SELECTOR PLAN 1
severe ILD  supportive regimen  bronchodilators  oxygen  keep sat > 88 pct  out of bed  increase activity

## 2017-05-06 NOTE — PROGRESS NOTE ADULT - PROBLEM SELECTOR PLAN 7
-DVT ppx: Lovenox 40mg SQ daily

## 2017-05-06 NOTE — PROGRESS NOTE ADULT - ASSESSMENT
72 female with PMHx of DM2, Pulm HTN, end stage ILD followed closely with Pulm @ [facility] Dr. Barajas p/w diarrhea x 1 week and worsening weakness, SOB, GAUTHIER admitted to tele for acute respiratory distress and viral gastroenteritis.

## 2017-05-06 NOTE — PROGRESS NOTE ADULT - SUBJECTIVE AND OBJECTIVE BOX
Date/Time Patient Seen:  		  Referring MD:   Data Reviewed	       Patient is a 72y old  Female who presents with a chief complaint of shortness of breath/abdominal pain (03 May 2017 00:37)  in bed  seen and examined  vs and meds reviewed  anxious  on oxygen      Subjective/HPI     PAST MEDICAL & SURGICAL HISTORY:  Chronic interstitial lung disease  Pulmonary hypertension  DM (diabetes mellitus): pulmonary htn  H/O abdominal hysterectomy: 2002        Medication list         MEDICATIONS  (STANDING):  buDESOnide  180 MICROgram(s) Inhaler 1Puff(s) Inhalation two times a day  ALBUTerol    0.083% 2.5milliGRAM(s) Nebulizer every 8 hours  predniSONE   Tablet 20milliGRAM(s) Oral daily  spironolactone 25milliGRAM(s) Oral daily  simvastatin 20milliGRAM(s) Oral at bedtime  enoxaparin Injectable 40milliGRAM(s) SubCutaneous daily  insulin lispro (HumaLOG) corrective regimen sliding scale  SubCutaneous three times a day before meals  insulin lispro (HumaLOG) corrective regimen sliding scale  SubCutaneous at bedtime  dextrose 5%. 1000milliLiter(s) IV Continuous <Continuous>  dextrose 50% Injectable 12.5Gram(s) IV Push once  dextrose 50% Injectable 25Gram(s) IV Push once  dextrose 50% Injectable 25Gram(s) IV Push once  mycophenolate mofetil 1000milliGRAM(s) Oral two times a day  tadalafil 40milliGRAM(s) Oral daily  treprostinil Inhalation 18MICROGram(s) Inhalation four times a day  omeprazole 40milliGRAM(s) Oral before breakfast    MEDICATIONS  (PRN):  LORazepam   Injectable 0.5milliGRAM(s) IV Push every 4 hours PRN anxiety, if PO not tolerated  dextrose Gel 1Dose(s) Oral once PRN Blood Glucose LESS THAN 70 milliGRAM(s)/deciliter  glucagon  Injectable 1milliGRAM(s) IntraMuscular once PRN Glucose LESS THAN 70 milligrams/deciliter  ALPRAZolam 0.25milliGRAM(s) Oral daily PRN anxiety         Vitals log        ICU Vital Signs Last 24 Hrs  T(C): 36.7, Max: 36.7 (05-06 @ 04:41)  T(F): 98.1, Max: 98.1 (05-06 @ 04:41)  HR: 92 (68 - 111)  BP: 90/56 (87/62 - 111/71)  BP(mean): --  ABP: --  ABP(mean): --  RR: 21 (19 - 24)  SpO2: 100% (93% - 100%)           Input and Output:  I&O's Detail  I & Os for 24h ending 05 May 2017 07:00  =============================================  IN:    Oral Fluid: 120 ml    IV PiggyBack: 100 ml    Total IN: 220 ml  ---------------------------------------------  OUT:    Total OUT: 0 ml  ---------------------------------------------  Total NET: 220 ml    I & Os for current day (as of 06 May 2017 06:08)  =============================================  IN:    Total IN: 0 ml  ---------------------------------------------  OUT:    Voided: 400 ml    Total OUT: 400 ml  ---------------------------------------------  Total NET: -400 ml      Lab Data                        12.2   12.6  )-----------( 333      ( 05 May 2017 14:39 )             39.9     05-05    143  |  107  |  15  ----------------------------<  155<H>  4.3   |  30  |  0.48<L>    Ca    8.6      05 May 2017 14:39  Phos  2.5     05-04  Mg     1.7     05-05    TPro  5.9<L>  /  Alb  3.1<L>  /  TBili  0.3  /  DBili  x   /  AST  17  /  ALT  19  /  AlkPhos  82  05-05            Review of Systems	      Objective     Physical Examination  heart - s1s2  lungs - dec BS  abd - soft  extr - no gross edema        Pertinent Lab findings & Imaging      Francie:  NO   Adequate UO     I&O's Detail  I & Os for 24h ending 05 May 2017 07:00  =============================================  IN:    Oral Fluid: 120 ml    IV PiggyBack: 100 ml    Total IN: 220 ml  ---------------------------------------------  OUT:    Total OUT: 0 ml  ---------------------------------------------  Total NET: 220 ml    I & Os for current day (as of 06 May 2017 06:08)  =============================================  IN:    Total IN: 0 ml  ---------------------------------------------  OUT:    Voided: 400 ml    Total OUT: 400 ml  ---------------------------------------------  Total NET: -400 ml           Discussed with:     Cultures:	        Radiology

## 2017-05-06 NOTE — PROGRESS NOTE ADULT - PROBLEM SELECTOR PROBLEM 7
Need for prophylactic measure

## 2017-05-07 ENCOUNTER — TRANSCRIPTION ENCOUNTER (OUTPATIENT)
Age: 72
End: 2017-05-07

## 2017-05-07 VITALS — OXYGEN SATURATION: 97 %

## 2017-05-07 LAB
ANION GAP SERPL CALC-SCNC: 5 MMOL/L — SIGNIFICANT CHANGE UP (ref 5–17)
BUN SERPL-MCNC: 15 MG/DL — SIGNIFICANT CHANGE UP (ref 7–23)
CALCIUM SERPL-MCNC: 8.6 MG/DL — SIGNIFICANT CHANGE UP (ref 8.5–10.1)
CHLORIDE SERPL-SCNC: 105 MMOL/L — SIGNIFICANT CHANGE UP (ref 96–108)
CO2 SERPL-SCNC: 33 MMOL/L — HIGH (ref 22–31)
CREAT SERPL-MCNC: 0.35 MG/DL — LOW (ref 0.5–1.3)
CULTURE RESULTS: SIGNIFICANT CHANGE UP
CULTURE RESULTS: SIGNIFICANT CHANGE UP
GLUCOSE SERPL-MCNC: 109 MG/DL — HIGH (ref 70–99)
MAGNESIUM SERPL-MCNC: 1.9 MG/DL — SIGNIFICANT CHANGE UP (ref 1.8–2.4)
POTASSIUM SERPL-MCNC: 3.5 MMOL/L — SIGNIFICANT CHANGE UP (ref 3.5–5.3)
POTASSIUM SERPL-SCNC: 3.5 MMOL/L — SIGNIFICANT CHANGE UP (ref 3.5–5.3)
SODIUM SERPL-SCNC: 143 MMOL/L — SIGNIFICANT CHANGE UP (ref 135–145)
SPECIMEN SOURCE: SIGNIFICANT CHANGE UP
SPECIMEN SOURCE: SIGNIFICANT CHANGE UP

## 2017-05-07 PROCEDURE — 84145 PROCALCITONIN (PCT): CPT

## 2017-05-07 PROCEDURE — 99285 EMERGENCY DEPT VISIT HI MDM: CPT | Mod: 25

## 2017-05-07 PROCEDURE — 36600 WITHDRAWAL OF ARTERIAL BLOOD: CPT

## 2017-05-07 PROCEDURE — 99239 HOSP IP/OBS DSCHRG MGMT >30: CPT

## 2017-05-07 PROCEDURE — 81001 URINALYSIS AUTO W/SCOPE: CPT

## 2017-05-07 PROCEDURE — 94640 AIRWAY INHALATION TREATMENT: CPT

## 2017-05-07 PROCEDURE — 80053 COMPREHEN METABOLIC PANEL: CPT

## 2017-05-07 PROCEDURE — 93005 ELECTROCARDIOGRAM TRACING: CPT

## 2017-05-07 PROCEDURE — 94660 CPAP INITIATION&MGMT: CPT

## 2017-05-07 PROCEDURE — 85610 PROTHROMBIN TIME: CPT

## 2017-05-07 PROCEDURE — 82803 BLOOD GASES ANY COMBINATION: CPT

## 2017-05-07 PROCEDURE — 71250 CT THORAX DX C-: CPT

## 2017-05-07 PROCEDURE — 87581 M.PNEUMON DNA AMP PROBE: CPT

## 2017-05-07 PROCEDURE — 87486 CHLMYD PNEUM DNA AMP PROBE: CPT

## 2017-05-07 PROCEDURE — 83690 ASSAY OF LIPASE: CPT

## 2017-05-07 PROCEDURE — 87633 RESP VIRUS 12-25 TARGETS: CPT

## 2017-05-07 PROCEDURE — 85730 THROMBOPLASTIN TIME PARTIAL: CPT

## 2017-05-07 PROCEDURE — 84100 ASSAY OF PHOSPHORUS: CPT

## 2017-05-07 PROCEDURE — 85027 COMPLETE CBC AUTOMATED: CPT

## 2017-05-07 PROCEDURE — 87040 BLOOD CULTURE FOR BACTERIA: CPT

## 2017-05-07 PROCEDURE — 83605 ASSAY OF LACTIC ACID: CPT

## 2017-05-07 PROCEDURE — 87798 DETECT AGENT NOS DNA AMP: CPT

## 2017-05-07 PROCEDURE — 74176 CT ABD & PELVIS W/O CONTRAST: CPT

## 2017-05-07 PROCEDURE — 87186 SC STD MICRODIL/AGAR DIL: CPT

## 2017-05-07 PROCEDURE — 80048 BASIC METABOLIC PNL TOTAL CA: CPT

## 2017-05-07 PROCEDURE — 96372 THER/PROPH/DIAG INJ SC/IM: CPT | Mod: 59

## 2017-05-07 PROCEDURE — 97162 PT EVAL MOD COMPLEX 30 MIN: CPT

## 2017-05-07 PROCEDURE — 94760 N-INVAS EAR/PLS OXIMETRY 1: CPT

## 2017-05-07 PROCEDURE — 82150 ASSAY OF AMYLASE: CPT

## 2017-05-07 PROCEDURE — 71045 X-RAY EXAM CHEST 1 VIEW: CPT

## 2017-05-07 PROCEDURE — 83735 ASSAY OF MAGNESIUM: CPT

## 2017-05-07 PROCEDURE — 87086 URINE CULTURE/COLONY COUNT: CPT

## 2017-05-07 RX ADMIN — TREPROSTINIL 18 MICROGRAM(S): 48 INHALANT ORAL at 08:26

## 2017-05-07 RX ADMIN — MYCOPHENOLATE MOFETIL 1000 MILLIGRAM(S): 250 CAPSULE ORAL at 06:01

## 2017-05-07 RX ADMIN — Medication 1 PUFF(S): at 08:15

## 2017-05-07 RX ADMIN — TREPROSTINIL 18 MICROGRAM(S): 48 INHALANT ORAL at 12:02

## 2017-05-07 RX ADMIN — OMEPRAZOLE 40 MILLIGRAM(S): 10 CAPSULE, DELAYED RELEASE ORAL at 06:53

## 2017-05-07 RX ADMIN — ALBUTEROL 2.5 MILLIGRAM(S): 90 AEROSOL, METERED ORAL at 07:51

## 2017-05-07 RX ADMIN — TADALAFIL 40 MILLIGRAM(S): 10 TABLET, FILM COATED ORAL at 08:19

## 2017-05-07 RX ADMIN — TREPROSTINIL 18 MICROGRAM(S): 48 INHALANT ORAL at 15:19

## 2017-05-07 RX ADMIN — Medication 20 MILLIGRAM(S): at 06:02

## 2017-05-07 RX ADMIN — Medication 0.25 MILLIGRAM(S): at 12:01

## 2017-05-07 RX ADMIN — Medication 2: at 12:08

## 2017-05-07 RX ADMIN — ALBUTEROL 2.5 MILLIGRAM(S): 90 AEROSOL, METERED ORAL at 13:31

## 2017-05-07 NOTE — DISCHARGE NOTE ADULT - PLAN OF CARE
due to viral gastroentritis, cont hydration, as above con home meds and o2 NC, f/u with her pulmonalogist in [facility].

## 2017-05-07 NOTE — PROGRESS NOTE ADULT - PROBLEM SELECTOR PLAN 1
multifactorial   anxiety and ILD and Pulm HTN and restr Lung Disease  monitor sat  keep sat > 88 pct  oral and skin care  pt has o2 at home

## 2017-05-07 NOTE — PROGRESS NOTE ADULT - SUBJECTIVE AND OBJECTIVE BOX
Date/Time Patient Seen:  		  Referring MD:   Data Reviewed	       Patient is a 72y old  Female who presents with a chief complaint of shortness of breath/abdominal pain (03 May 2017 00:37)  in bed  seen and examined  vs and meds reviewed  on oxygen  anxious  stool is more formed and diarrhea has resolved      Subjective/HPI     PAST MEDICAL & SURGICAL HISTORY:  Chronic interstitial lung disease  Pulmonary hypertension  DM (diabetes mellitus): pulmonary htn  H/O abdominal hysterectomy: 2002        Medication list         MEDICATIONS  (STANDING):  buDESOnide  180 MICROgram(s) Inhaler 1Puff(s) Inhalation two times a day  ALBUTerol    0.083% 2.5milliGRAM(s) Nebulizer every 8 hours  predniSONE   Tablet 20milliGRAM(s) Oral daily  spironolactone 25milliGRAM(s) Oral daily  simvastatin 20milliGRAM(s) Oral at bedtime  enoxaparin Injectable 40milliGRAM(s) SubCutaneous daily  insulin lispro (HumaLOG) corrective regimen sliding scale  SubCutaneous three times a day before meals  insulin lispro (HumaLOG) corrective regimen sliding scale  SubCutaneous at bedtime  dextrose 5%. 1000milliLiter(s) IV Continuous <Continuous>  dextrose 50% Injectable 12.5Gram(s) IV Push once  dextrose 50% Injectable 25Gram(s) IV Push once  dextrose 50% Injectable 25Gram(s) IV Push once  mycophenolate mofetil 1000milliGRAM(s) Oral two times a day  tadalafil 40milliGRAM(s) Oral daily  treprostinil Inhalation 18MICROGram(s) Inhalation four times a day  omeprazole 40milliGRAM(s) Oral before breakfast    MEDICATIONS  (PRN):  LORazepam   Injectable 0.5milliGRAM(s) IV Push every 4 hours PRN anxiety, if PO not tolerated  dextrose Gel 1Dose(s) Oral once PRN Blood Glucose LESS THAN 70 milliGRAM(s)/deciliter  glucagon  Injectable 1milliGRAM(s) IntraMuscular once PRN Glucose LESS THAN 70 milligrams/deciliter  ALPRAZolam 0.25milliGRAM(s) Oral daily PRN anxiety         Vitals log        ICU Vital Signs Last 24 Hrs  T(C): 36.8, Max: 36.8 (05-07 @ 04:15)  T(F): 98.2, Max: 98.2 (05-07 @ 04:15)  HR: 100 (87 - 115)  BP: 100/58 (97/63 - 120/80)  BP(mean): --  ABP: --  ABP(mean): --  RR: 19 (19 - 44)  SpO2: 100% (91% - 100%)           Input and Output:  I&O's Detail    I & Os for current day (as of 07 May 2017 06:22)  =============================================  IN:    Total IN: 0 ml  ---------------------------------------------  OUT:    Voided: 400 ml    Total OUT: 400 ml  ---------------------------------------------  Total NET: -400 ml      Lab Data                        11.1   8.9   )-----------( 284      ( 06 May 2017 06:04 )             34.9     05-07    143  |  105  |  15  ----------------------------<  109<H>  3.5   |  33<H>  |  0.35<L>    Ca    8.6      07 May 2017 05:45  Mg     1.9     05-07    TPro  5.0<L>  /  Alb  2.6<L>  /  TBili  0.3  /  DBili  x   /  AST  16  /  ALT  19  /  AlkPhos  69  05-06            Review of Systems	      Objective     Physical Examination    heart - s1s2  lungs - dec BS  abd - soft  extr - trace edema      Pertinent Lab findings & Imaging      Francie:  NO   Adequate UO     I&O's Detail    I & Os for current day (as of 07 May 2017 06:22)  =============================================  IN:    Total IN: 0 ml  ---------------------------------------------  OUT:    Voided: 400 ml    Total OUT: 400 ml  ---------------------------------------------  Total NET: -400 ml           Discussed with:     Cultures:	        Radiology

## 2017-05-07 NOTE — DISCHARGE NOTE ADULT - HOSPITAL COURSE
History of Present Illness: 	  This is a 72 female with PMHx of DM2, HLD, Pulm HTN, end stage ILD followed closely with Pulm @ [facility] Dr. Barajas p/w diarrhea x 1 week and worsening weakness, SOB, GAUTHIER. Pt states she first noticed the diarrhea last Wednesday, bc her son-in-law was sick with the same symptoms. She tried Imodium with minimal relief. She went to [facility] this past Friday, but due to no electrolyte abnormalities was discharged. Pt developed worsening watery, non-bloody diarrhea over the last 24 hours, with 4-5 episodes today. Pt states "this is not C.Diff because I had in last year and know what it is like." Pt states the diarrhea got worse due to having +sick contacts the last few days in the house. Pt was recently admitted for SOB with +influenza 4/1/2017, treated with tamiflu and azithromycin. Pt states she is on home O2 6L, can ambulate minimally with walker without GAUTHIER. 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 Pt denies CP, palpitations, n/v/, HA, dizziness, fevers or chills. Pt admits to lower abd discomfort, nasal congestion and sick contacts. Pt denies recent travel, hematochezia or melena.    In the ED, pt was tachypneic and tachycardic. Pt O2 sats decrease to 60-70% if she moves around or lies supine. Pt O2 sats now 96% on BiPaP. Pt does not want intubation. Pt labs sig for WBC of 13.3, ABG done, UA negative, XRAY stable, CT chest/abd stable. Pt given 1L NS bolus.     ICU eval done in ED, deemed appropriate for tele admission.  Pulm, Dr. Neil, saw pt in ED-appreciate recs.      Problem/Plan - 1:  ·  Problem: Respiratory distress.  Plan: -acute on chronic, sec to ILD, improving slowly  -Dr. Neil (pulm) following; cont steroid, f/u with her pulmonalogist.    Problem/Plan - 2:  ·  Problem: Diarrhea.  Plan: -likely viral gastroentritis in etiology, resolved.       Problem/Plan - 3:  ·  Problem: Chronic interstitial lung disease.  Plan: -continue albuterol nebs, prednisone, pulmicort  -cont cellcept 50mg BID.     Problem/Plan - 4:  ·  Problem: Pulmonary hypertension.  Plan: -severe  -cont Adcirca, Tyvaso, aldactone.     Problem/Plan - 5:  ·  Problem: DM (diabetes mellitus).  Plan: type 2  -cont home meds. .     Problem/Plan - 6:  Problem: Hyperlipidemia. Plan: -chronic; cont zocor.     Problem/Plan - 8:  ·  Problem: Hypokalemia.  Plan: corrected with po Kcl. monitor k level.     Problem/Plan - 9:  ·  Problem: Hypomagnesemia.  Plan: resolved.     I spent 40 min and notified her pcp Dr patel  PHYSICAL EXAM    Constitutional: NAD, well-groomed, well-developed  HEENT: PERRLA, EOMI, Normal Hearing, MMM  Neck: No LAD, No JVD  Back: Normal spine flexure, No CVA tenderness  Respiratory: rales,   Cardiovascular: S1 and S2, RRR, no M/G/R  Gastrointestinal: BS+, soft, NT/ND  Extremities: No peripheral edema  Vascular: 2+ peripheral pulses  Neurological: A/O x 3, no focal deficits  Skin: No rashes

## 2017-05-07 NOTE — PROGRESS NOTE ADULT - PROBLEM SELECTOR PROBLEM 5
DM (diabetes mellitus)
Diarrhea
Pulmonary hypertension

## 2017-05-07 NOTE — PROGRESS NOTE ADULT - PROBLEM SELECTOR PLAN 3
-continue albuterol nebs, prednisone, pulmicort  -cont cellcept 50mg BID
causing restrictive lung disease  incentive rupesh  BIPAP use  monitor sats  keep sat > 86 pct  mobilize as tolerated  overall better  dc planning  am LABS pending
cvs regimen  cont tyvaso and adcirca  consider diuresis  I and O  tele monitor  dc planning
kyphosis causing restrictive lung disease  incentive rupesh  02  keep sat > 86 pct  oral and skin care  BIPAP prn for resp distress  cont tele monitoring
resolved  stool is formed  pt feels better  dc planning
resolving issue  o2 support  I and O  tele monitoring  dc planning  cont present Pulm regimen  anxiolytics  discussed plan of care with the patient

## 2017-05-07 NOTE — DISCHARGE NOTE ADULT - CARE PLAN
Principal Discharge DX:	Diarrhea  Goal:	due to viral gastroentritis, cont hydration,  Instructions for follow-up, activity and diet:	as above  Secondary Diagnosis:	Chronic interstitial lung disease  Goal:	con home meds and o2 NC, f/u with her pulmonalogist in [facility]. Principal Discharge DX:	Diarrhea  Goal:	due to viral gastroentritis, cont hydration,  Instructions for follow-up, activity and diet:	as above  Secondary Diagnosis:	Chronic interstitial lung disease  Goal:	con home meds and o2 NC, f/u with her pulmonalogist in [facility]. Principal Discharge DX:	Diarrhea  Goal:	due to viral gastroentritis, cont hydration,  Instructions for follow-up, activity and diet:	as above  Secondary Diagnosis:	Chronic interstitial lung disease  Goal:	con home meds and o2 NC, f/u with her pulmonalogist in [facility]. Principal Discharge DX:	Diarrhea  Goal:	due to viral gastroentritis, cont hydration,  Instructions for follow-up, activity and diet:	as above  Secondary Diagnosis:	Chronic interstitial lung disease  Goal:	con home meds and o2 NC, f/u with her pulmonalogist in [facility].

## 2017-05-07 NOTE — PROGRESS NOTE ADULT - PROBLEM SELECTOR PLAN 2
-likely viral in etiology, resolving, pct negative  -f/u stool cx and stool studies  -strict i/o, encourage PO intake  -f/u stool cultures, will check for C. Diff if diarrhea worsens  -monitor lytes closely, replete as necessary with AM labs  -had one loose bowel movement today, not watery/foul smelling
-likely viral in etiology, resolving, pct negative  -f/u stool cx and stool studies  -strict i/o, encourage PO intake  -f/u stool cultures, will check for C. Diff if diarrhea worsens  -monitor lytes closely, replete as necessary with AM labs  -had one loose bowel movement today, not watery/foul smelling
-likely viral in etiology  -f/u stool cx and stool studies  -strict i/o, encourage PO intake  -f/u stool cultures, will check for C. Diff if diarrhea worsens  -monitor lytes closely, replete as necessary with AM labs  -had one loose bowel movement today, not watery/foul smelling
-likely viral in etiology, resolving  -f/u stool cx and stool studies  -strict i/o, encourage PO intake  -f/u stool cultures, will check for C. Diff if diarrhea worsens  -monitor lytes closely, replete as necessary with AM labs  -had one loose bowel movement today, not watery/foul smelling
anxiolytics  reassurance  oxygen  pulmonary regimen
cvs regimen  eventually needs diuresis  cont adcirca and tyvaso  will follow up with her regular PMD Pulm in [facility]  end stage disease  o2 dependent
on pulm HTN regimen  remodulin and adcirca  monitor BP   on tele monitor
pt must use her meds for Adcirca and Tyvaso  caution with IVF  pt has advanced Pulm HTN, caution with volume overload  diarrhea subsided
supportive care  bronchodilators  o2 support  dc planning  will follow up with MARIANN Holt MD in [facility]

## 2017-05-07 NOTE — PROGRESS NOTE ADULT - PROBLEM SELECTOR PROBLEM 3
Chronic interstitial lung disease
Diarrhea
Kyphosis
Kyphosis
Pulmonary hypertension
Respiratory distress

## 2017-05-07 NOTE — DISCHARGE NOTE ADULT - MEDICATION SUMMARY - MEDICATIONS TO TAKE
I will START or STAY ON the medications listed below when I get home from the hospital:    Pulmicort Flexhaler 180 mcg/inh inhalation powder  -- 1 puff(s) inhaled 2 times a day  -- Indication: For Interstitial lung disease    predniSONE 20 mg oral tablet  -- 1 tab(s) by mouth once a day  -- Indication: For Interstitial lung disease    Adcirca 20 mg oral tablet  -- 2 tab(s) by mouth once a day  -- Indication: For Pulmonary hypertension    Tyvaso 0.6 mg/mL inhalation solution  -- 3 puff(s) inhaled 4 times a day upto 9puff  -- Indication: For Pulmonary hypertension    Aldactone 25 mg oral tablet  -- 1 tab(s) by mouth once a day  -- Indication: For Pulmonary hypertension    glimepiride 1 mg oral tablet  -- 1 tab(s) by mouth once a day  -- Indication: For DM (diabetes mellitus)    simvastatin 20 mg oral tablet  -- 1 tab(s) by mouth once a day (at bedtime)  -- Indication: For Hyperlipidemia    ALPRAZolam 0.25 mg oral tablet  -- 1 tab(s) by mouth every 12 hours, As needed, anxiety MDD:2 tabs  -- Indication: For Home meds    Proventil HFA 90 mcg/inh inhalation aerosol  -- 2 puff(s) inhaled 4 times a day, As Needed  -- Indication: For Shortness of breath    CellCept 500 mg oral tablet  -- 2 tab(s) by mouth 2 times a day  -- Indication: For Interstitial lung disease    NexIUM 40 mg oral delayed release capsule  -- 1 cap(s) by mouth once a day  -- Indication: For Home meds    Mucinex DM  -- 1 tab(s) by mouth every 12 hours, As Needed cough  -- Indication: For Interstitial lung disease

## 2017-05-07 NOTE — DISCHARGE NOTE ADULT - PATIENT PORTAL LINK FT
“You can access the [facility] Patient Portal, offered by [facility], by registering with the following website: [email or web address]”

## 2017-05-07 NOTE — PROGRESS NOTE ADULT - PROBLEM SELECTOR PROBLEM 4
Pulmonary hypertension
Diarrhea
Kyphosis

## 2017-05-07 NOTE — PROGRESS NOTE ADULT - PROBLEM SELECTOR PROBLEM 2
Diarrhea
Pulmonary hypertension
Chronic interstitial lung disease
Pulmonary hypertension
Pulmonary hypertension
Shortness of breath
Diarrhea

## 2017-05-07 NOTE — PROGRESS NOTE ADULT - PROBLEM SELECTOR PROBLEM 1
Respiratory distress
Chronic interstitial lung disease
Shortness of breath

## 2017-05-12 DIAGNOSIS — A08.4 VIRAL INTESTINAL INFECTION, UNSPECIFIED: ICD-10-CM

## 2017-05-12 DIAGNOSIS — E83.42 HYPOMAGNESEMIA: ICD-10-CM

## 2017-05-12 DIAGNOSIS — J96.21 ACUTE AND CHRONIC RESPIRATORY FAILURE WITH HYPOXIA: ICD-10-CM

## 2017-05-12 DIAGNOSIS — Z90.710 ACQUIRED ABSENCE OF BOTH CERVIX AND UTERUS: ICD-10-CM

## 2017-05-12 DIAGNOSIS — E87.6 HYPOKALEMIA: ICD-10-CM

## 2017-05-12 DIAGNOSIS — J84.9 INTERSTITIAL PULMONARY DISEASE, UNSPECIFIED: ICD-10-CM

## 2017-05-12 DIAGNOSIS — E11.9 TYPE 2 DIABETES MELLITUS WITHOUT COMPLICATIONS: ICD-10-CM

## 2017-05-12 DIAGNOSIS — R00.0 TACHYCARDIA, UNSPECIFIED: ICD-10-CM

## 2017-05-12 DIAGNOSIS — E78.5 HYPERLIPIDEMIA, UNSPECIFIED: ICD-10-CM

## 2017-05-12 DIAGNOSIS — I27.2 OTHER SECONDARY PULMONARY HYPERTENSION: ICD-10-CM

## 2017-09-12 ENCOUNTER — INPATIENT (INPATIENT)
Facility: HOSPITAL | Age: 72
LOS: 9 days | Discharge: NURSING, REHAB OR LONG-TERM CARE | DRG: 314 | End: 2017-09-22
Attending: INTERNAL MEDICINE | Admitting: INTERNAL MEDICINE
Payer: COMMERCIAL

## 2017-09-12 VITALS
OXYGEN SATURATION: 98 % | RESPIRATION RATE: 24 BRPM | DIASTOLIC BLOOD PRESSURE: 83 MMHG | TEMPERATURE: 98 F | HEART RATE: 108 BPM | WEIGHT: 139.99 LBS | SYSTOLIC BLOOD PRESSURE: 149 MMHG

## 2017-09-12 DIAGNOSIS — Z90.710 ACQUIRED ABSENCE OF BOTH CERVIX AND UTERUS: Chronic | ICD-10-CM

## 2017-09-12 DIAGNOSIS — J84.9 INTERSTITIAL PULMONARY DISEASE, UNSPECIFIED: ICD-10-CM

## 2017-09-12 DIAGNOSIS — I63.9 CEREBRAL INFARCTION, UNSPECIFIED: ICD-10-CM

## 2017-09-12 DIAGNOSIS — I27.2 OTHER SECONDARY PULMONARY HYPERTENSION: ICD-10-CM

## 2017-09-12 DIAGNOSIS — R06.00 DYSPNEA, UNSPECIFIED: ICD-10-CM

## 2017-09-12 DIAGNOSIS — Z90.49 ACQUIRED ABSENCE OF OTHER SPECIFIED PARTS OF DIGESTIVE TRACT: Chronic | ICD-10-CM

## 2017-09-12 LAB
ALBUMIN SERPL ELPH-MCNC: 3.6 G/DL — SIGNIFICANT CHANGE UP (ref 3.3–5)
ALP SERPL-CCNC: 59 U/L — SIGNIFICANT CHANGE UP (ref 40–120)
ALT FLD-CCNC: 23 U/L — SIGNIFICANT CHANGE UP (ref 12–78)
ANION GAP SERPL CALC-SCNC: 10 MMOL/L — SIGNIFICANT CHANGE UP (ref 5–17)
APPEARANCE UR: CLEAR — SIGNIFICANT CHANGE UP
AST SERPL-CCNC: 19 U/L — SIGNIFICANT CHANGE UP (ref 15–37)
BACTERIA # UR AUTO: ABNORMAL
BASE EXCESS BLDA CALC-SCNC: 8.9 MMOL/L — HIGH (ref -2–2)
BASOPHILS # BLD AUTO: 0.1 K/UL — SIGNIFICANT CHANGE UP (ref 0–0.2)
BASOPHILS NFR BLD AUTO: 0.6 % — SIGNIFICANT CHANGE UP (ref 0–2)
BILIRUB SERPL-MCNC: 0.8 MG/DL — SIGNIFICANT CHANGE UP (ref 0.2–1.2)
BILIRUB UR-MCNC: NEGATIVE — SIGNIFICANT CHANGE UP
BLOOD GAS COMMENTS ARTERIAL: SIGNIFICANT CHANGE UP
BUN SERPL-MCNC: 20 MG/DL — SIGNIFICANT CHANGE UP (ref 7–23)
CALCIUM SERPL-MCNC: 9.4 MG/DL — SIGNIFICANT CHANGE UP (ref 8.5–10.1)
CHLORIDE SERPL-SCNC: 98 MMOL/L — SIGNIFICANT CHANGE UP (ref 96–108)
CO2 SERPL-SCNC: 32 MMOL/L — HIGH (ref 22–31)
COLOR SPEC: YELLOW — SIGNIFICANT CHANGE UP
CREAT SERPL-MCNC: 0.62 MG/DL — SIGNIFICANT CHANGE UP (ref 0.5–1.3)
DIFF PNL FLD: NEGATIVE — SIGNIFICANT CHANGE UP
EOSINOPHIL # BLD AUTO: 0 K/UL — SIGNIFICANT CHANGE UP (ref 0–0.5)
EOSINOPHIL NFR BLD AUTO: 0.1 % — SIGNIFICANT CHANGE UP (ref 0–6)
EPI CELLS # UR: SIGNIFICANT CHANGE UP
GLUCOSE SERPL-MCNC: 177 MG/DL — HIGH (ref 70–99)
GLUCOSE UR QL: NEGATIVE — SIGNIFICANT CHANGE UP
HCO3 BLDA-SCNC: 32 MMOL/L — HIGH (ref 23–27)
HCT VFR BLD CALC: 43.5 % — SIGNIFICANT CHANGE UP (ref 34.5–45)
HGB BLD-MCNC: 13.6 G/DL — SIGNIFICANT CHANGE UP (ref 11.5–15.5)
HOROWITZ INDEX BLDA+IHG-RTO: 40 — SIGNIFICANT CHANGE UP
KETONES UR-MCNC: ABNORMAL
LACTATE SERPL-SCNC: 1.6 MMOL/L — SIGNIFICANT CHANGE UP (ref 0.7–2)
LEUKOCYTE ESTERASE UR-ACNC: ABNORMAL
LYMPHOCYTES # BLD AUTO: 0.9 K/UL — LOW (ref 1–3.3)
LYMPHOCYTES # BLD AUTO: 5.4 % — LOW (ref 13–44)
MCHC RBC-ENTMCNC: 30.4 PG — SIGNIFICANT CHANGE UP (ref 27–34)
MCHC RBC-ENTMCNC: 31.3 GM/DL — LOW (ref 32–36)
MCV RBC AUTO: 97.2 FL — SIGNIFICANT CHANGE UP (ref 80–100)
MONOCYTES # BLD AUTO: 0.3 K/UL — SIGNIFICANT CHANGE UP (ref 0–0.9)
MONOCYTES NFR BLD AUTO: 2 % — SIGNIFICANT CHANGE UP (ref 1–9)
NEUTROPHILS # BLD AUTO: 14.5 K/UL — HIGH (ref 1.8–7.4)
NEUTROPHILS NFR BLD AUTO: 91.9 % — HIGH (ref 43–77)
NITRITE UR-MCNC: NEGATIVE — SIGNIFICANT CHANGE UP
PCO2 BLDA: 51 MMHG — HIGH (ref 32–46)
PH BLDA: 7.43 — SIGNIFICANT CHANGE UP (ref 7.35–7.45)
PH UR: 5 — SIGNIFICANT CHANGE UP (ref 5–8)
PLATELET # BLD AUTO: 280 K/UL — SIGNIFICANT CHANGE UP (ref 150–400)
PO2 BLDA: 80 MMHG — SIGNIFICANT CHANGE UP (ref 74–108)
POTASSIUM SERPL-MCNC: 4.8 MMOL/L — SIGNIFICANT CHANGE UP (ref 3.5–5.3)
POTASSIUM SERPL-SCNC: 4.8 MMOL/L — SIGNIFICANT CHANGE UP (ref 3.5–5.3)
PROCALCITONIN SERPL-MCNC: <0.05 — SIGNIFICANT CHANGE UP (ref 0–0.04)
PROT SERPL-MCNC: 7 G/DL — SIGNIFICANT CHANGE UP (ref 6–8.3)
PROT UR-MCNC: NEGATIVE — SIGNIFICANT CHANGE UP
RAPID RVP RESULT: SIGNIFICANT CHANGE UP
RBC # BLD: 4.48 M/UL — SIGNIFICANT CHANGE UP (ref 3.8–5.2)
RBC # FLD: 14.3 % — SIGNIFICANT CHANGE UP (ref 10.3–14.5)
RBC CASTS # UR COMP ASSIST: SIGNIFICANT CHANGE UP /HPF (ref 0–4)
SAO2 % BLDA: 94 % — SIGNIFICANT CHANGE UP (ref 92–96)
SODIUM SERPL-SCNC: 140 MMOL/L — SIGNIFICANT CHANGE UP (ref 135–145)
SP GR SPEC: 1.01 — SIGNIFICANT CHANGE UP (ref 1.01–1.02)
UROBILINOGEN FLD QL: NEGATIVE — SIGNIFICANT CHANGE UP
WBC # BLD: 15.8 K/UL — HIGH (ref 3.8–10.5)
WBC # FLD AUTO: 15.8 K/UL — HIGH (ref 3.8–10.5)
WBC UR QL: SIGNIFICANT CHANGE UP

## 2017-09-12 PROCEDURE — 71010: CPT | Mod: 26

## 2017-09-12 PROCEDURE — 99285 EMERGENCY DEPT VISIT HI MDM: CPT

## 2017-09-12 PROCEDURE — 70450 CT HEAD/BRAIN W/O DYE: CPT | Mod: 26

## 2017-09-12 PROCEDURE — 99291 CRITICAL CARE FIRST HOUR: CPT

## 2017-09-12 PROCEDURE — 93010 ELECTROCARDIOGRAM REPORT: CPT

## 2017-09-12 RX ORDER — INFLUENZA VIRUS VACCINE 15; 15; 15; 15 UG/.5ML; UG/.5ML; UG/.5ML; UG/.5ML
0.5 SUSPENSION INTRAMUSCULAR ONCE
Qty: 0 | Refills: 0 | Status: DISCONTINUED | OUTPATIENT
Start: 2017-09-12 | End: 2017-09-22

## 2017-09-12 RX ORDER — IPRATROPIUM/ALBUTEROL SULFATE 18-103MCG
3 AEROSOL WITH ADAPTER (GRAM) INHALATION ONCE
Qty: 0 | Refills: 0 | Status: COMPLETED | OUTPATIENT
Start: 2017-09-12 | End: 2017-09-12

## 2017-09-12 RX ORDER — IPRATROPIUM BROMIDE 0.2 MG/ML
500 SOLUTION, NON-ORAL INHALATION ONCE
Qty: 0 | Refills: 0 | Status: COMPLETED | OUTPATIENT
Start: 2017-09-12 | End: 2017-09-12

## 2017-09-12 RX ORDER — ENOXAPARIN SODIUM 100 MG/ML
40 INJECTION SUBCUTANEOUS DAILY
Qty: 0 | Refills: 0 | Status: DISCONTINUED | OUTPATIENT
Start: 2017-09-12 | End: 2017-09-22

## 2017-09-12 RX ORDER — SIMVASTATIN 20 MG/1
10 TABLET, FILM COATED ORAL AT BEDTIME
Qty: 0 | Refills: 0 | Status: DISCONTINUED | OUTPATIENT
Start: 2017-09-12 | End: 2017-09-13

## 2017-09-12 RX ORDER — ALBUTEROL 90 UG/1
2.5 AEROSOL, METERED ORAL ONCE
Qty: 0 | Refills: 0 | Status: COMPLETED | OUTPATIENT
Start: 2017-09-12 | End: 2017-09-12

## 2017-09-12 RX ORDER — SODIUM CHLORIDE 9 MG/ML
1000 INJECTION INTRAMUSCULAR; INTRAVENOUS; SUBCUTANEOUS ONCE
Qty: 0 | Refills: 0 | Status: COMPLETED | OUTPATIENT
Start: 2017-09-12 | End: 2017-09-12

## 2017-09-12 RX ORDER — AZITHROMYCIN 500 MG/1
500 TABLET, FILM COATED ORAL ONCE
Qty: 0 | Refills: 0 | Status: COMPLETED | OUTPATIENT
Start: 2017-09-12 | End: 2017-09-12

## 2017-09-12 RX ORDER — CEFTRIAXONE 500 MG/1
1 INJECTION, POWDER, FOR SOLUTION INTRAMUSCULAR; INTRAVENOUS ONCE
Qty: 0 | Refills: 0 | Status: COMPLETED | OUTPATIENT
Start: 2017-09-12 | End: 2017-09-12

## 2017-09-12 RX ORDER — ALPRAZOLAM 0.25 MG
0.25 TABLET ORAL ONCE
Qty: 0 | Refills: 0 | Status: DISCONTINUED | OUTPATIENT
Start: 2017-09-12 | End: 2017-09-12

## 2017-09-12 RX ORDER — VANCOMYCIN HCL 1 G
1000 VIAL (EA) INTRAVENOUS EVERY 12 HOURS
Qty: 0 | Refills: 0 | Status: DISCONTINUED | OUTPATIENT
Start: 2017-09-12 | End: 2017-09-14

## 2017-09-12 RX ORDER — PIPERACILLIN AND TAZOBACTAM 4; .5 G/20ML; G/20ML
3.38 INJECTION, POWDER, LYOPHILIZED, FOR SOLUTION INTRAVENOUS ONCE
Qty: 0 | Refills: 0 | Status: COMPLETED | OUTPATIENT
Start: 2017-09-12 | End: 2017-09-12

## 2017-09-12 RX ORDER — BUDESONIDE, MICRONIZED 100 %
0.5 POWDER (GRAM) MISCELLANEOUS
Qty: 0 | Refills: 0 | Status: DISCONTINUED | OUTPATIENT
Start: 2017-09-12 | End: 2017-09-22

## 2017-09-12 RX ORDER — ALBUTEROL 90 UG/1
2.5 AEROSOL, METERED ORAL EVERY 6 HOURS
Qty: 0 | Refills: 0 | Status: DISCONTINUED | OUTPATIENT
Start: 2017-09-12 | End: 2017-09-22

## 2017-09-12 RX ORDER — ASPIRIN/CALCIUM CARB/MAGNESIUM 324 MG
325 TABLET ORAL DAILY
Qty: 0 | Refills: 0 | Status: DISCONTINUED | OUTPATIENT
Start: 2017-09-12 | End: 2017-09-22

## 2017-09-12 RX ORDER — SODIUM CHLORIDE 9 MG/ML
1000 INJECTION, SOLUTION INTRAVENOUS
Qty: 0 | Refills: 0 | Status: DISCONTINUED | OUTPATIENT
Start: 2017-09-12 | End: 2017-09-13

## 2017-09-12 RX ORDER — PIPERACILLIN AND TAZOBACTAM 4; .5 G/20ML; G/20ML
3.38 INJECTION, POWDER, LYOPHILIZED, FOR SOLUTION INTRAVENOUS EVERY 8 HOURS
Qty: 0 | Refills: 0 | Status: COMPLETED | OUTPATIENT
Start: 2017-09-12 | End: 2017-09-16

## 2017-09-12 RX ADMIN — Medication 0.5 MILLIGRAM(S): at 20:09

## 2017-09-12 RX ADMIN — PIPERACILLIN AND TAZOBACTAM 25 GRAM(S): 4; .5 INJECTION, POWDER, LYOPHILIZED, FOR SOLUTION INTRAVENOUS at 23:10

## 2017-09-12 RX ADMIN — Medication 3 MILLILITER(S): at 13:18

## 2017-09-12 RX ADMIN — Medication 0.25 MILLIGRAM(S): at 13:14

## 2017-09-12 RX ADMIN — Medication 125 MILLIGRAM(S): at 13:10

## 2017-09-12 RX ADMIN — ALBUTEROL 2.5 MILLIGRAM(S): 90 AEROSOL, METERED ORAL at 20:09

## 2017-09-12 RX ADMIN — Medication 500 MICROGRAM(S): at 13:52

## 2017-09-12 RX ADMIN — SODIUM CHLORIDE 1000 MILLILITER(S): 9 INJECTION INTRAMUSCULAR; INTRAVENOUS; SUBCUTANEOUS at 14:00

## 2017-09-12 RX ADMIN — CEFTRIAXONE 100 GRAM(S): 500 INJECTION, POWDER, FOR SOLUTION INTRAMUSCULAR; INTRAVENOUS at 13:00

## 2017-09-12 RX ADMIN — ALBUTEROL 2.5 MILLIGRAM(S): 90 AEROSOL, METERED ORAL at 13:38

## 2017-09-12 RX ADMIN — AZITHROMYCIN 255 MILLIGRAM(S): 500 TABLET, FILM COATED ORAL at 14:00

## 2017-09-12 RX ADMIN — Medication 40 MILLIGRAM(S): at 22:00

## 2017-09-12 RX ADMIN — PIPERACILLIN AND TAZOBACTAM 200 GRAM(S): 4; .5 INJECTION, POWDER, LYOPHILIZED, FOR SOLUTION INTRAVENOUS at 18:13

## 2017-09-12 RX ADMIN — Medication 3 MILLILITER(S): at 15:04

## 2017-09-12 RX ADMIN — Medication 250 MILLIGRAM(S): at 21:56

## 2017-09-12 RX ADMIN — Medication 345.83 MILLIGRAM(S): at 21:55

## 2017-09-12 NOTE — ED PROVIDER NOTE - OBJECTIVE STATEMENT
sob.  Forget to take Proventil , ams today as per home aide, couldn't remember pmd's name and med.  pmd- [facility].  Home O2 8L/min

## 2017-09-12 NOTE — ED ADULT NURSE REASSESSMENT NOTE - NS ED NURSE REASSESS COMMENT FT1
resp effort improved, patient now sleeping but arousable, reports feeling improved, still diaphoretic, Davila MD made aware. Will continue to monitor.

## 2017-09-12 NOTE — PROGRESS NOTE ADULT - SUBJECTIVE AND OBJECTIVE BOX
ICU Progress Note    HPI:    S:    Pt seen and examined  HD # 1  Pt here for SOB, dyspnea  PMHx advanced pulmonary fibrosis (2/2 [employer]), on cellcerp and chronic steroids, pulm HTN, chronic resp failure (on 6-8L NC at home)  Awoke confused today and SOB  In resp distress in ED, placed on bipap with some improvement  Tx for PNA with rocephin, azithromycin, steroids  CTH shows subacute L PCA infarct        Allergies    No Known Allergies    Intolerances        MEDICATIONS  (STANDING):  piperacillin/tazobactam IVPB. 3.375 Gram(s) IV Intermittent every 8 hours  vancomycin  IVPB 1000 milliGRAM(s) IV Intermittent every 12 hours  methylPREDNISolone sodium succinate Injectable 40 milliGRAM(s) IV Push every 8 hours  buDESOnide   0.5 milliGRAM(s) Respule 0.5 milliGRAM(s) Inhalation two times a day  ALBUTerol    0.083% 2.5 milliGRAM(s) Nebulizer every 6 hours  trimethoprim / sulfamethoxazole IVPB 300 milliGRAM(s) IV Intermittent every 8 hours  lactated ringers. 1000 milliLiter(s) (50 mL/Hr) IV Continuous <Continuous>  enoxaparin Injectable 40 milliGRAM(s) SubCutaneous daily  influenza   Vaccine 0.5 milliLiter(s) IntraMuscular once    MEDICATIONS  (PRN):  LORazepam   Injectable 0.5 milliGRAM(s) IV Push every 6 hours PRN dyspnea, resp distress      Drug Dosing Weight  Height (cm): 167.64 (02 May 2017 14:56)  Weight (kg): 63.5 (12 Sep 2017 12:20)  BMI (kg/m2): 22.6 (12 Sep 2017 12:20)  BSA (m2): 1.72 (12 Sep 2017 12:20)      PAST MEDICAL & SURGICAL HISTORY:  Chronic interstitial lung disease  Pulmonary hypertension  DM (diabetes mellitus): pulmonary htn  History of cholecystectomy  H/O abdominal hysterectomy:       FAMILY HISTORY:      ROS: See HPI; otherwise, all systems reviewed and negative.    O:    ICU Vital Signs Last 24 Hrs  T(C): 36.9 (12 Sep 2017 19:53), Max: 37.5 (12 Sep 2017 14:25)  T(F): 98.4 (12 Sep 2017 19:53), Max: 99.5 (12 Sep 2017 14:25)  HR: 107 (12 Sep 2017 23:09) (99 - 111)  BP: 109/64 (12 Sep 2017 21:00) (103/41 - 149/83)  BP(mean): 82 (12 Sep 2017 21:00) (74 - 88)  ABP: --  ABP(mean): --  RR: 32 (12 Sep 2017 21:00) (18 - 40)  SpO2: 98% (12 Sep 2017 23:09) (96% - 100%)      ABG - ( 12 Sep 2017 13:10 )  pH: 7.43  /  pCO2: 51    /  pO2: 80    / HCO3: 32    / Base Excess: 8.9   /  SaO2: 94          I&O's Detail    12 Sep 2017 07:01  -  12 Sep 2017 23:25  --------------------------------------------------------  IN:    IV PiggyBack: 750 mL    lactated ringers.: 100 mL  Total IN: 850 mL    OUT:  Total OUT: 0 mL    Total NET: 850 mL      PE:    Constitutional: Chronically ill appearing F sitting up in bed, NIV mask on.   Neck: No JVD, trachea midline.   Respiratory: Mild bibasillar crackles, no other adventitious sounds appreciated.  Cardiovascular: S1S2+ RRR no M/R/G.  Gastrointestinal: Soft, NTND.  Extremities: No peripheral edema, No cyanosis, clubbing.  Neurological: Awake, conversive, alert, no gross deficits.  Skin: No rashes, warm, moist.    LABS:    CBC Full  -  ( 12 Sep 2017 12:57 )  WBC Count : 15.8 K/uL  Hemoglobin : 13.6 g/dL  Hematocrit : 43.5 %  Platelet Count - Automated : 280 K/uL  Mean Cell Volume : 97.2 fl  Mean Cell Hemoglobin : 30.4 pg  Mean Cell Hemoglobin Concentration : 31.3 gm/dL  Auto Neutrophil # : 14.5 K/uL  Auto Lymphocyte # : 0.9 K/uL  Auto Monocyte # : 0.3 K/uL  Auto Eosinophil # : 0.0 K/uL  Auto Basophil # : 0.1 K/uL  Auto Neutrophil % : 91.9 %  Auto Lymphocyte % : 5.4 %  Auto Monocyte % : 2.0 %  Auto Eosinophil % : 0.1 %  Auto Basophil % : 0.6 %        140  |  98  |  20  ----------------------------<  177<H>  4.8   |  32<H>  |  0.62    Ca    9.4      12 Sep 2017 12:57    TPro  7.0  /  Alb  3.6  /  TBili  0.8  /  DBili  x   /  AST  19  /  ALT  23  /  AlkPhos  59        Urinalysis Basic - ( 12 Sep 2017 16:45 )    Color: Yellow / Appearance: Clear / S.010 / pH: x  Gluc: x / Ketone: Moderate  / Bili: Negative / Urobili: Negative   Blood: x / Protein: Negative / Nitrite: Negative   Leuk Esterase: Trace / RBC: 0-2 /HPF / WBC 3-5   Sq Epi: x / Non Sq Epi: x / Bacteria: x      CAPILLARY BLOOD GLUCOSE  190 (12 Sep 2017 18:16)            LIVER FUNCTIONS - ( 12 Sep 2017 12:57 )  Alb: 3.6 g/dL / Pro: 7.0 g/dL / ALK PHOS: 59 U/L / ALT: 23 U/L / AST: 19 U/L / GGT: x

## 2017-09-12 NOTE — CONSULT NOTE ADULT - PROBLEM SELECTOR RECOMMENDATION 2
NEBS  Steroids Systemic and Nebulized  BIPAP  keep sat > 88 pct  oral care  HOB elevation  prognosis guarded to poor  pt is DNR DNI  pall care eval for MOLST

## 2017-09-12 NOTE — ED ADULT NURSE NOTE - OBJECTIVE STATEMENT
Patient with increased respiratory effort over the past for days, worst today. patient BIBEMS, unable to speak in full sentences, tachypneic, on 8L NC with O2 sat of 98%. patient reports persistent headache and frequent periods of forgetfulness. will continue to monitor.

## 2017-09-12 NOTE — H&P ADULT - HISTORY OF PRESENT ILLNESS
Pt complains of worsening sob since last night requiring more o2 then usual, felt better on 8 liters, in er with bipap, also states had brief episode of ams. pt with significant sob going to icu for further monitoring. no prior hx of cva, has significant hx of pulm htn, all her doctors including pulm at [facility]

## 2017-09-12 NOTE — CONSULT NOTE ADULT - PROBLEM SELECTOR RECOMMENDATION 9
kyphosis, restr lung disease, pulm HTN, ILD  NIPPV  ABG noted  I and O  pulse ox cont  keep sat > 88 pct  ativan PRN  tyvaso, pulm HTN regimen  BP control  diuresis PRN

## 2017-09-12 NOTE — ED PROVIDER NOTE - CARE PLAN
Principal Discharge DX:	Dyspnea Principal Discharge DX:	Dyspnea  Secondary Diagnosis:	CVA (cerebral vascular accident)

## 2017-09-12 NOTE — H&P ADULT - NSHPSOCIALHISTORY_GEN_ALL_CORE
lives at home with  and aid  on home o2 via nc  walks with a walker without problems  no prior hx of cva

## 2017-09-12 NOTE — CONSULT NOTE ADULT - SUBJECTIVE AND OBJECTIVE BOX
Date/Time Patient Seen:  		  Referring MD:   Data Reviewed	       Patient is a 72y old  Female who presents with a chief complaint of worsening sob (12 Sep 2017 17:35)      Subjective/HPI    seen and examined  on BIPAP  resp distress  verbal  alert  vs and meds reviewed  well known to me from multiple prior admissions    has extensive medical hx, ILD, Pulm HTN, on Tyvaso, on bronchodilators, sees Dr. Barajas for ILD  on oxygen at home  no recent travel  no recent sick contacts       PAST MEDICAL & SURGICAL HISTORY:  Chronic interstitial lung disease  Pulmonary hypertension  DM (diabetes mellitus): pulmonary htn  History of cholecystectomy  H/O abdominal hysterectomy: 2002        Medication list         MEDICATIONS  (STANDING):  piperacillin/tazobactam IVPB. 3.375 Gram(s) IV Intermittent every 8 hours  vancomycin  IVPB 1000 milliGRAM(s) IV Intermittent every 12 hours  methylPREDNISolone sodium succinate Injectable 40 milliGRAM(s) IV Push every 8 hours  buDESOnide   0.5 milliGRAM(s) Respule 0.5 milliGRAM(s) Inhalation two times a day  ALBUTerol    0.083% 2.5 milliGRAM(s) Nebulizer every 6 hours    MEDICATIONS  (PRN):  LORazepam   Injectable 0.5 milliGRAM(s) IV Push every 6 hours PRN dyspnea, resp distress         Vitals log        ICU Vital Signs Last 24 Hrs  T(C): 37.5 (12 Sep 2017 14:25), Max: 37.5 (12 Sep 2017 14:25)  T(F): 99.5 (12 Sep 2017 14:25), Max: 99.5 (12 Sep 2017 14:25)  HR: 103 (12 Sep 2017 18:43) (99 - 111)  BP: 112/42 (12 Sep 2017 17:33) (103/41 - 149/83)  BP(mean): --  ABP: --  ABP(mean): --  RR: 18 (12 Sep 2017 14:25) (18 - 24)  SpO2: 100% (12 Sep 2017 18:43) (96% - 100%)           Input and Output:  I&O's Detail      Lab Data                        13.6   15.8  )-----------( 280      ( 12 Sep 2017 12:57 )             43.5     09-12    140  |  98  |  20  ----------------------------<  177<H>  4.8   |  32<H>  |  0.62    Ca    9.4      12 Sep 2017 12:57    TPro  7.0  /  Alb  3.6  /  TBili  0.8  /  DBili  x   /  AST  19  /  ALT  23  /  AlkPhos  59  09-12    ABG - ( 12 Sep 2017 13:10 )  pH: 7.43  /  pCO2: 51    /  pO2: 80    / HCO3: 32    / Base Excess: 8.9   /  SaO2: 94                non 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

## 2017-09-12 NOTE — CONSULT NOTE ADULT - ATTENDING COMMENTS
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    --acute/subacute L PCA infarct  start ASA rectal  permissive htn  check echo, carotid dopplars, lipid panel  neuro consult  speech/PT evals when stable from respiratory standpoint  --pulm htn, continue adcirca, restart tyvaso ( will bring from home) when able to use inhaler  obtain last outpt echo  appears euvolemic but continue home aldactone  --acute on chronic hypoxemic respiratory failure from HCAP v ILD flare v progession of fibrosis  currently on BiPAP, alternate with high flow NC as tolerates  ILD flare, empiric vanc, zosyn, IV steroids  check sputum cx and RVP, f/u BCx  continue bronchodilators  ?PCP given cellcept and prednisone, start treatment dose bactrim  hold cellcept and prednisone in setting of possible active infection  --NPO for now  --renal function stable  --DVT ppx  --discussed GOC with .  Given her advanced pulmonary fibrosis, he wishes for her to be DNR/DNI  --pt critically ill. CC time 60min

## 2017-09-12 NOTE — PROGRESS NOTE ADULT - ASSESSMENT
A:    72yFemale  HD # 1    Here for:    1. Mixed resp failure requiring bipap  2. PNA  3. Subacute CVA    Pt requires ICU level care for NIV, mgmt of pulmonary status    P:    Neuro: GCS 15. Monitor for delirium.  Continue to optimize pain control. Serial Neurologic assessments. f/u Neuro. Carotid US.    HEENT: No issues.    CV: Continue hemodynamic monitoring. ASA. f/u echo.    Pulm: NIV, wean as tolerated. f/u AM ABG. Nebs, steroids.    GI/Nutrition: NPO while on bipap. Cont diet, bowel Regimen.    /Renal: Monitor UOP. Monitor BMP.  Replete Lytes as needed.    HEME- DVT prophylaxis, SCDs, f/u CBC.    ID:  Cont abx. f/u Cx's.    Lines/Tubes: PIV.    Endo: Statin.    Skin: Skin care.    Dispo: Cont critical care.

## 2017-09-12 NOTE — CONSULT NOTE ADULT - PROBLEM SELECTOR RECOMMENDATION 3
BIPAP  ABG noted  CXR Reviewed  Ativan PRN  pulm Rx regimen - nebs, steroids, ativan, HOB elevation, suction PRN  chest pt  may need diuresis  I and O  icu supportive care  check RVP  emp ABX  cx pending  biomarkers  supportive regimen

## 2017-09-12 NOTE — CONSULT NOTE ADULT - SUBJECTIVE AND OBJECTIVE BOX
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  Attributed it to Tyvaso so decreased dose from 9 puffs to 4 puffs.  Her  noticed yesterday she had increased dyspnea and she increased her O2 from 6L to 8.5L (with SpO2 95).  This am awoke and was confused (unsure if she took her Tyvaso etc).   brought her to ED.    In ED was in respiratory distress and placed on BiPAP with some subjective improvement.  Received CTX, azithro, 1L NS, solumedrol 125.  CTH showed subacute L PCA infarct.    Allergies: No Known Allergies    PAST MEDICAL & SURGICAL HISTORY:  advanced pulmonary fibrosis from textile chemicals  Pulmonary hypertension  DM (diabetes mellitus):   History of cholecystectomy  H/O abdominal hysterectomy:     SOCIAL HISTORY:  former professor at [employer], worked in designing [employer]    Review of Systems: unable to obtain due to respiratory distress    T(F): 98.4 (17 @ 19:53), Max: 99.5 (17 @ 14:25)  HR: 103 (17 @ 18:43) (99 - 111)  BP: 111/86 (17 @ 19:00) (103/41 - 149/83)  RR: 40 (17 @ 19:00) (18 - 40)  SpO2: 100% (17 @ 19:00)    CAPILLARY BLOOD GLUCOSE  190 (12 Sep 2017 18:16)    Physical Exam:   Gen: chronically ill appearing, dyspneic on BiPAP  Neuro: alert, answers questions appropriately  HEENT: PERRL  CVS: tachy, regular  Resp: fine crackles b/l bases  Abd: soft, NTND    Meds:  piperacillin/tazobactam IVPB. 3.375 Gram(s) IV Intermittent every 8 hours  vancomycin  IVPB 1000 milliGRAM(s) IV Intermittent every 12 hours  trimethoprim / sulfamethoxazole IVPB 300 milliGRAM(s) IV Intermittent every 8 hours    methylPREDNISolone sodium succinate Injectable 40 milliGRAM(s) IV Push every 8 hours     buDESOnide   0.5 milliGRAM(s) Respule 0.5 milliGRAM(s) Inhalation two times a day  ALBUTerol    0.083% 2.5 milliGRAM(s) Nebulizer every 6 hours     LORazepam   Injectable 0.5 milliGRAM(s) IV Push every 6 hours PRN                              13.6   15.8  )-----------( 280      ( 12 Sep 2017 12:57 )             43.5           140  |  98  |  20  ----------------------------<  177<H>  4.8   |  32<H>  |  0.62    Ca    9.4      12 Sep 2017 12:57    TPro  7.0  /  Alb  3.6  /  TBili  0.8  /  DBili  x   /  AST  19  /  ALT  23  /  AlkPhos  59      Lactate 1.6            @ 12:57      Urinalysis Basic - ( 12 Sep 2017 16:45 )    Color: Yellow / Appearance: Clear / S.010 / pH: x  Gluc: x / Ketone: Moderate  / Bili: Negative / Urobili: Negative   Blood: x / Protein: Negative / Nitrite: Negative   Leuk Esterase: Trace / RBC: 0-2 /HPF / WBC 3-5   Sq Epi: x / Non Sq Epi: x / Bacteria: x      ABG - ( 12 Sep 2017 13:10 )  pH: 7.43  /  pCO2: 51    /  pO2: 80    / HCO3: 32    / Base Excess: 8.9   /  SaO2: 94        Radiology:   CT Head No Cont (17 @ 13:38) >  INTERPRETATION:  History: Altered mental status.  Noncontrast head CT. No priors.    Age-appropriate parenchymal volume loss.  There is a segmental area of low-attenuation 4.3 x 2.4 cm left occipital   gray and white matter with effacement of the regional sulci consistent   with acute or subacute infarct. Questionable small amount of petechial   hemorrhage. No significant mass effect. No other ischemic or hemorrhagic   foci noted. No extra-axial collections. Clear sinuses. Cranium intact    Impression:    Acute/subacute left PCA infarct. Possible trace petechial hemorrhage.    Discussed with Dr. Davila prior to this dictation    Xray Chest 1 View AP/PA (17 @ 13:19) >  Low lung volumes may reflect a component of restrictive lung disease.   Generalized bilateral interstitial fibrosis similar to prior. No focal   consolidation or pleural effusion. Stable global cardiomegaly. Clips   right upper quadrant.    Impression: As above    CT Chest No Cont (17 @ 18:14) >  Chest: Interstitial lung disease, centrilobular emphysema. Honeycombing   visible at the lung bases. Groundglass opacities both lower lobes. No   effusion. No vascular congestion. No pneumothorax. No suspicious   pulmonary nodules. Central airway intact. No mediastinal lesions,   evaluation limited due to lack of IV contrast. No hilar lesions,   evaluation limited due to lack of IV contrast.  No evidence of pericardial effusion or thoracic aortic aneurysm. Heart   size appears enlarged. Coronary calcifications present.

## 2017-09-13 DIAGNOSIS — M40.209 UNSPECIFIED KYPHOSIS, SITE UNSPECIFIED: ICD-10-CM

## 2017-09-13 LAB
ANION GAP SERPL CALC-SCNC: 6 MMOL/L — SIGNIFICANT CHANGE UP (ref 5–17)
BASE EXCESS BLDA CALC-SCNC: 9.5 MMOL/L — HIGH (ref -2–2)
BLOOD GAS COMMENTS ARTERIAL: SIGNIFICANT CHANGE UP
BLOOD GAS COMMENTS ARTERIAL: SIGNIFICANT CHANGE UP
BUN SERPL-MCNC: 16 MG/DL — SIGNIFICANT CHANGE UP (ref 7–23)
CALCIUM SERPL-MCNC: 8.7 MG/DL — SIGNIFICANT CHANGE UP (ref 8.5–10.1)
CHLORIDE SERPL-SCNC: 97 MMOL/L — SIGNIFICANT CHANGE UP (ref 96–108)
CHOLEST SERPL-MCNC: 159 MG/DL — SIGNIFICANT CHANGE UP (ref 10–199)
CO2 SERPL-SCNC: 35 MMOL/L — HIGH (ref 22–31)
CREAT SERPL-MCNC: 0.55 MG/DL — SIGNIFICANT CHANGE UP (ref 0.5–1.3)
CRP SERPL-MCNC: 2 MG/DL — HIGH (ref 0–0.4)
GLUCOSE SERPL-MCNC: 200 MG/DL — HIGH (ref 70–99)
HBA1C BLD-MCNC: 7 % — HIGH (ref 4–5.6)
HCO3 BLDA-SCNC: 33 MMOL/L — HIGH (ref 23–27)
HCT VFR BLD CALC: 36.1 % — SIGNIFICANT CHANGE UP (ref 34.5–45)
HDLC SERPL-MCNC: 58 MG/DL — SIGNIFICANT CHANGE UP (ref 40–125)
HGB BLD-MCNC: 11.5 G/DL — SIGNIFICANT CHANGE UP (ref 11.5–15.5)
HOROWITZ INDEX BLDA+IHG-RTO: 50 — SIGNIFICANT CHANGE UP
LIPID PNL WITH DIRECT LDL SERPL: 83 MG/DL — SIGNIFICANT CHANGE UP
MAGNESIUM SERPL-MCNC: 1.9 MG/DL — SIGNIFICANT CHANGE UP (ref 1.6–2.6)
MCHC RBC-ENTMCNC: 30.8 PG — SIGNIFICANT CHANGE UP (ref 27–34)
MCHC RBC-ENTMCNC: 31.9 GM/DL — LOW (ref 32–36)
MCV RBC AUTO: 96.6 FL — SIGNIFICANT CHANGE UP (ref 80–100)
PCO2 BLDA: 51 MMHG — HIGH (ref 32–46)
PH BLDA: 7.44 — SIGNIFICANT CHANGE UP (ref 7.35–7.45)
PHOSPHATE SERPL-MCNC: 3.2 MG/DL — SIGNIFICANT CHANGE UP (ref 2.5–4.5)
PLATELET # BLD AUTO: 232 K/UL — SIGNIFICANT CHANGE UP (ref 150–400)
PO2 BLDA: 98 MMHG — SIGNIFICANT CHANGE UP (ref 74–108)
POTASSIUM SERPL-MCNC: 4.7 MMOL/L — SIGNIFICANT CHANGE UP (ref 3.5–5.3)
POTASSIUM SERPL-SCNC: 4.7 MMOL/L — SIGNIFICANT CHANGE UP (ref 3.5–5.3)
PROCALCITONIN SERPL-MCNC: <0.05 NG/ML — HIGH (ref 0–0.04)
RBC # BLD: 3.74 M/UL — LOW (ref 3.8–5.2)
RBC # FLD: 13.8 % — SIGNIFICANT CHANGE UP (ref 10.3–14.5)
SAO2 % BLDA: 95 % — SIGNIFICANT CHANGE UP (ref 92–96)
SODIUM SERPL-SCNC: 138 MMOL/L — SIGNIFICANT CHANGE UP (ref 135–145)
TOTAL CHOLESTEROL/HDL RATIO MEASUREMENT: 2.7 RATIO — LOW (ref 3.3–7.1)
TRIGL SERPL-MCNC: 90 MG/DL — SIGNIFICANT CHANGE UP (ref 10–149)
WBC # BLD: 8.9 K/UL — SIGNIFICANT CHANGE UP (ref 3.8–10.5)
WBC # FLD AUTO: 8.9 K/UL — SIGNIFICANT CHANGE UP (ref 3.8–10.5)

## 2017-09-13 PROCEDURE — 99291 CRITICAL CARE FIRST HOUR: CPT

## 2017-09-13 RX ORDER — TADALAFIL 10 MG/1
2 TABLET, FILM COATED ORAL
Qty: 0 | Refills: 0 | COMMUNITY

## 2017-09-13 RX ORDER — GLIMEPIRIDE 1 MG
1 TABLET ORAL
Qty: 0 | Refills: 0 | COMMUNITY

## 2017-09-13 RX ORDER — SIMVASTATIN 20 MG/1
20 TABLET, FILM COATED ORAL AT BEDTIME
Qty: 0 | Refills: 0 | Status: DISCONTINUED | OUTPATIENT
Start: 2017-09-13 | End: 2017-09-22

## 2017-09-13 RX ORDER — ALBUTEROL 90 UG/1
2 AEROSOL, METERED ORAL
Qty: 0 | Refills: 0 | COMMUNITY

## 2017-09-13 RX ORDER — SIMVASTATIN 20 MG/1
1 TABLET, FILM COATED ORAL
Qty: 0 | Refills: 0 | COMMUNITY

## 2017-09-13 RX ORDER — TREPROSTINIL 48 UG/1
9 INHALANT ORAL
Qty: 0 | Refills: 0 | COMMUNITY

## 2017-09-13 RX ORDER — ESOMEPRAZOLE MAGNESIUM 40 MG/1
1 CAPSULE, DELAYED RELEASE ORAL
Qty: 0 | Refills: 0 | COMMUNITY

## 2017-09-13 RX ORDER — BUDESONIDE, MICRONIZED 100 %
1 POWDER (GRAM) MISCELLANEOUS
Qty: 0 | Refills: 0 | COMMUNITY

## 2017-09-13 RX ORDER — MYCOPHENOLATE MOFETIL 250 MG/1
1000 CAPSULE ORAL
Qty: 0 | Refills: 0 | Status: DISCONTINUED | OUTPATIENT
Start: 2017-09-13 | End: 2017-09-15

## 2017-09-13 RX ORDER — MYCOPHENOLATE MOFETIL 250 MG/1
2 CAPSULE ORAL
Qty: 0 | Refills: 0 | COMMUNITY

## 2017-09-13 RX ORDER — PANTOPRAZOLE SODIUM 20 MG/1
40 TABLET, DELAYED RELEASE ORAL
Qty: 0 | Refills: 0 | Status: DISCONTINUED | OUTPATIENT
Start: 2017-09-13 | End: 2017-09-14

## 2017-09-13 RX ORDER — TADALAFIL 10 MG/1
40 TABLET, FILM COATED ORAL DAILY
Qty: 0 | Refills: 0 | Status: DISCONTINUED | OUTPATIENT
Start: 2017-09-13 | End: 2017-09-15

## 2017-09-13 RX ORDER — SPIRONOLACTONE 25 MG/1
1 TABLET, FILM COATED ORAL
Qty: 0 | Refills: 0 | COMMUNITY

## 2017-09-13 RX ORDER — TREPROSTINIL 48 UG/1
3 INHALANT ORAL
Qty: 0 | Refills: 0 | COMMUNITY

## 2017-09-13 RX ORDER — ALPRAZOLAM 0.25 MG
1 TABLET ORAL
Qty: 0 | Refills: 0 | COMMUNITY

## 2017-09-13 RX ORDER — SPIRONOLACTONE 25 MG/1
25 TABLET, FILM COATED ORAL DAILY
Qty: 0 | Refills: 0 | Status: DISCONTINUED | OUTPATIENT
Start: 2017-09-13 | End: 2017-09-22

## 2017-09-13 RX ORDER — AZTREONAM 2 G
0 VIAL (EA) INJECTION
Qty: 0 | Refills: 0 | COMMUNITY

## 2017-09-13 RX ADMIN — Medication 600 MILLIGRAM(S): at 18:33

## 2017-09-13 RX ADMIN — SIMVASTATIN 20 MILLIGRAM(S): 20 TABLET, FILM COATED ORAL at 22:12

## 2017-09-13 RX ADMIN — Medication 40 MILLIGRAM(S): at 13:31

## 2017-09-13 RX ADMIN — Medication 345.83 MILLIGRAM(S): at 05:29

## 2017-09-13 RX ADMIN — PIPERACILLIN AND TAZOBACTAM 25 GRAM(S): 4; .5 INJECTION, POWDER, LYOPHILIZED, FOR SOLUTION INTRAVENOUS at 13:31

## 2017-09-13 RX ADMIN — ALBUTEROL 2.5 MILLIGRAM(S): 90 AEROSOL, METERED ORAL at 19:58

## 2017-09-13 RX ADMIN — Medication 40 MILLIGRAM(S): at 22:12

## 2017-09-13 RX ADMIN — ALBUTEROL 2.5 MILLIGRAM(S): 90 AEROSOL, METERED ORAL at 01:05

## 2017-09-13 RX ADMIN — ENOXAPARIN SODIUM 40 MILLIGRAM(S): 100 INJECTION SUBCUTANEOUS at 11:12

## 2017-09-13 RX ADMIN — ALBUTEROL 2.5 MILLIGRAM(S): 90 AEROSOL, METERED ORAL at 14:08

## 2017-09-13 RX ADMIN — MYCOPHENOLATE MOFETIL 1000 MILLIGRAM(S): 250 CAPSULE ORAL at 18:33

## 2017-09-13 RX ADMIN — PIPERACILLIN AND TAZOBACTAM 25 GRAM(S): 4; .5 INJECTION, POWDER, LYOPHILIZED, FOR SOLUTION INTRAVENOUS at 22:12

## 2017-09-13 RX ADMIN — ALBUTEROL 2.5 MILLIGRAM(S): 90 AEROSOL, METERED ORAL at 08:02

## 2017-09-13 RX ADMIN — Medication 0.5 MILLIGRAM(S): at 08:02

## 2017-09-13 RX ADMIN — SPIRONOLACTONE 25 MILLIGRAM(S): 25 TABLET, FILM COATED ORAL at 11:13

## 2017-09-13 RX ADMIN — Medication 250 MILLIGRAM(S): at 09:20

## 2017-09-13 RX ADMIN — Medication 40 MILLIGRAM(S): at 05:29

## 2017-09-13 RX ADMIN — TADALAFIL 40 MILLIGRAM(S): 10 TABLET, FILM COATED ORAL at 13:32

## 2017-09-13 RX ADMIN — Medication 0.5 MILLIGRAM(S): at 19:55

## 2017-09-13 RX ADMIN — PIPERACILLIN AND TAZOBACTAM 25 GRAM(S): 4; .5 INJECTION, POWDER, LYOPHILIZED, FOR SOLUTION INTRAVENOUS at 05:29

## 2017-09-13 RX ADMIN — Medication 325 MILLIGRAM(S): at 11:13

## 2017-09-13 RX ADMIN — Medication 250 MILLIGRAM(S): at 22:11

## 2017-09-13 NOTE — PROGRESS NOTE ADULT - SUBJECTIVE AND OBJECTIVE BOX
Neurology Follow up note    DAVID [id number]    HPI:  Pt complains of worsening sob since last night requiring more o2 then usual, felt better on 8 liters, in er with bipap, also states had brief episode of ams. pt with significant sob going to icu for further monitoring. no prior hx of cva, has significant hx of pulm htn, all her doctors including pulm at [facility] (12 Sep 2017 17:35)      Interval History -doing better.    Patient is seen, chart was reviewed and case was discussed with the treatment team.  Pt is not in any distress.   Lying on bed comfortably.   No events reported overnight.   No clinical seizure was reported.  Sitting on chair bed comfortably.    is at bedside.    Vital Signs Last 24 Hrs  T(C): 36.7 (13 Sep 2017 11:59), Max: 37.5 (12 Sep 2017 14:25)  T(F): 98 (13 Sep 2017 11:59), Max: 99.5 (12 Sep 2017 14:25)  HR: 101 (13 Sep 2017 12:00) (97 - 111)  BP: 99/58 (13 Sep 2017 12:00) (83/51 - 118/69)  BP(mean): 74 (13 Sep 2017 12:00) (63 - 88)  RR: 36 (13 Sep 2017 12:00) (18 - 47)  SpO2: 97% (13 Sep 2017 12:00) (92% - 100%)    Height (cm): 167.6 (-13 @ 04:30)    REVIEW OF SYSTEMS:    Constitutional: No fever, weight loss or fatigue  Eyes: No eye pain, visual disturbances, or discharge  ENT:  No difficulty hearing, tinnitus, vertigo; No sinus or throat pain  Neck: No pain or stiffness.  Cardiovascular: No chest pain, palpitations, shortness of breath, dizziness or leg swelling  Gastrointestinal: No abdominal or epigastric pain. No nausea, vomiting or hematemesis; No diarrhea or constipation. No melena or hematochezia.  Genitourinary: No dysuria, frequency, hematuria or incontinence  Neurological: No headaches, memory loss, loss of strength, numbness or tremors  Psychiatric: No depression, anxiety, mood swings or difficulty sleeping  Musculoskeletal: No joint pain or swelling; No muscle, back or extremity pain  Skin: No itching, burning, rashes or lesions   Lymph Nodes: No enlarged glands  Endocrine: No heat or cold intolerance; No hair loss, No h/o diabetes or thyroid dysfunction  Allergy and Immunologic: No hives or eczema    On Neurological Examination:    Mental Status - Pt is alert, awake, oriented X3. Follows commands well and able to answer questions appropriately.Mood and affect  normal    Speech -  Normal.    Cranial Nerves - Pupils 3 mm equal and reactive to light, extraocular eye movements intact. Pt has right visual field deficit.  Pt has no  facial asymmetry. Facial sensation is intact.Tongue - is in midline.    Muscle tone - is normal all over. Moves all extremities equally. No asymmetry is seen.      Motor Exam - 5/5 of UE. 4/5 OF LE.    Sensory Exam -  Pt withdraws all extremities equally on stimulation. No asymmetry seen. No complaints of tingling, numbness.       coordination:    Finger to nose: normal.    Deep tendon Reflexes - 2 plus all over.        Romberg - Negative.    Neck Supple -  Yes.     MEDICATIONS    piperacillin/tazobactam IVPB. 3.375 Gram(s) IV Intermittent every 8 hours  vancomycin  IVPB 1000 milliGRAM(s) IV Intermittent every 12 hours  methylPREDNISolone sodium succinate Injectable 40 milliGRAM(s) IV Push every 8 hours  buDESOnide   0.5 milliGRAM(s) Respule 0.5 milliGRAM(s) Inhalation two times a day  ALBUTerol    0.083% 2.5 milliGRAM(s) Nebulizer every 6 hours  enoxaparin Injectable 40 milliGRAM(s) SubCutaneous daily  influenza   Vaccine 0.5 milliLiter(s) IntraMuscular once  aspirin 325 milliGRAM(s) Oral daily  simvastatin 10 milliGRAM(s) Oral at bedtime  spironolactone 25 milliGRAM(s) Oral daily  mycophenolate mofetil 1000 milliGRAM(s) Oral two times a day  tadalafil 40 milliGRAM(s) Oral daily  tyvaso inhalation 7 Puff(s) 7 Puff(s) Inhalation four times a day      Allergies    No Known Allergies    Intolerances        LABS:  CBC Full  -  ( 13 Sep 2017 06:47 )  WBC Count : 8.9 K/uL  Hemoglobin : 11.5 g/dL  Hematocrit : 36.1 %  Platelet Count - Automated : 232 K/uL  Mean Cell Volume : 96.6 fl  Mean Cell Hemoglobin : 30.8 pg  Mean Cell Hemoglobin Concentration : 31.9 gm/dL      Urinalysis Basic - ( 12 Sep 2017 16:45 )    Color: Yellow / Appearance: Clear / S.010 / pH: x  Gluc: x / Ketone: Moderate  / Bili: Negative / Urobili: Negative   Blood: x / Protein: Negative / Nitrite: Negative   Leuk Esterase: Trace / RBC: 0-2 /HPF / WBC 3-5   Sq Epi: x / Non Sq Epi: x / Bacteria: x          138  |  97  |  16  ----------------------------<  200<H>  4.7   |  35<H>  |  0.55    Ca    8.7      13 Sep 2017 06:47  Phos  3.2       Mg     1.9         TPro  7.0  /  Alb  3.6  /  TBili  0.8  /  DBili  x   /  AST  19  /  ALT  23  /  AlkPhos  59      Hemoglobin A1C: Hemoglobin A1C, Whole Blood: 7.0 % ( @ 08:28)    Lipid Panel  @ 08:28  Total Cholesterol, Serum 159  LDL 83  Triglycerides 90    Vitamin B12 .    RADIOLOGY.    ASSESSMENT AND PLAN:       SUBACUTE LEFT PCA INFARCT WITH HA/CONFUSION AND RIGHT HOMONYMOUS HEMIANOPSIA.  SP RESPIRATORY  FAILURE     CONTINUE ASA.  CONSIDER BRAIN MRI-MRA WHEN MEDICALLY STABLE.  ECHO.  CAROTID DOPPLER.  Physical therapy evaluation.  OOB to chair/ambulation with assistance only.  Plan of care was discussed with family. Questions answered.  Would continue to follow.

## 2017-09-13 NOTE — PROVIDER CONTACT NOTE (OTHER) - SITUATION
pt hgb was 13   pt now 11.5 with a hct of 36.1. pt is on methylprednisolone  40 mg q8h  asa 325 mg daily and enoxaparin   40 mg sq daily

## 2017-09-13 NOTE — PROGRESS NOTE ADULT - SUBJECTIVE AND OBJECTIVE BOX
Patient is a 72y old  Female who presents with a chief complaint of SOB x4 days, headache, forgetfulness (12 Sep 2017 19:33)      INTERVAL HPI/OVERNIGHT EVENTS: stable, feels better, pulm noted     MEDICATIONS  (STANDING):  piperacillin/tazobactam IVPB. 3.375 Gram(s) IV Intermittent every 8 hours  vancomycin  IVPB 1000 milliGRAM(s) IV Intermittent every 12 hours  methylPREDNISolone sodium succinate Injectable 40 milliGRAM(s) IV Push every 8 hours  buDESOnide   0.5 milliGRAM(s) Respule 0.5 milliGRAM(s) Inhalation two times a day  ALBUTerol    0.083% 2.5 milliGRAM(s) Nebulizer every 6 hours  enoxaparin Injectable 40 milliGRAM(s) SubCutaneous daily  influenza   Vaccine 0.5 milliLiter(s) IntraMuscular once  aspirin 325 milliGRAM(s) Oral daily  simvastatin 10 milliGRAM(s) Oral at bedtime  spironolactone 25 milliGRAM(s) Oral daily  Tyvaso 4 Puff(s) 4 Puff(s) Inhalation <User Schedule>  mycophenolate mofetil 1000 milliGRAM(s) Oral two times a day  tadalafil 40 milliGRAM(s) Oral daily    MEDICATIONS  (PRN):      Allergies    No Known Allergies    Intolerances        REVIEW OF SYSTEMS:  CONSTITUTIONAL: No fever, weight loss, or fatigue  EYES: No eye pain, visual disturbances  ENMT:  No difficulty hearing, tinnitus, vertigo; No sinus or throat pain  NECK: No pain or stiffness  RESPIRATORY: less sob  CARDIOVASCULAR: No chest pain, palpitations, dizziness  GASTROINTESTINAL: No abdominal or epigastric pain. No nausea, vomiting, or hematemesis; No diarrhea or constipation. No melena or hematochezia.  GENITOURINARY: No dysuria, frequency, hematuria, or incontinence  NEUROLOGICAL: No headaches, memory loss, loss of strength, numbness, or tremors  SKIN: No itching, burning  LYMPH NODES: No enlarged glands  MUSCULOSKELETAL: No joint pain or swelling; No muscle, back, or extremity pain  PSYCHIATRIC: No depression, mood swings  HEME/LYMPH: No easy bruising, or bleeding gums  ALLERGY AND IMMUNOLOGIC: No hives    Vital Signs Last 24 Hrs  T(C): 36.7 (13 Sep 2017 11:59), Max: 37.5 (12 Sep 2017 14:25)  T(F): 98 (13 Sep 2017 11:59), Max: 99.5 (12 Sep 2017 14:25)  HR: 101 (13 Sep 2017 12:00) (97 - 111)  BP: 99/58 (13 Sep 2017 12:00) (83/51 - 149/83)  BP(mean): 74 (13 Sep 2017 12:00) (63 - 88)  RR: 36 (13 Sep 2017 12:00) (18 - 47)  SpO2: 97% (13 Sep 2017 12:00) (92% - 100%)    PHYSICAL EXAM:  GENERAL: NAD, well-groomed, well-developed  HEAD:  Atraumatic, Normocephalic  EYES: EOMI, PERRLA, conjunctiva and sclera clear  ENMT: No tonsillar erythema, exudates, or enlargement   NECK: Supple, No JVD  NERVOUS SYSTEM:  Alert & Oriented   CHEST/LUNG: Clear to auscultation bilaterally; No rales, rhonchi, wheezing  HEART: Regular rate and rhythm  ABDOMEN: Soft, Nontender, Nondistended; Bowel sounds present  EXTREMITIES:  2+ Peripheral Pulses   LYMPH: No lymphadenopathy noted  SKIN: No rashes     LABS:                        11.5   8.9   )-----------( 232      ( 13 Sep 2017 06:47 )             36.1     13 Sep 2017 06:47    138    |  97     |  16     ----------------------------<  200    4.7     |  35     |  0.55     Ca    8.7        13 Sep 2017 06:47  Phos  3.2       13 Sep 2017 06:47  Mg     1.9       13 Sep 2017 06:47    TPro  7.0    /  Alb  3.6    /  TBili  0.8    /  DBili  x      /  AST  19     /  ALT  23     /  AlkPhos  59     12 Sep 2017 12:57      Urinalysis Basic - ( 12 Sep 2017 16:45 )    Color: Yellow / Appearance: Clear / S.010 / pH: x  Gluc: x / Ketone: Moderate  / Bili: Negative / Urobili: Negative   Blood: x / Protein: Negative / Nitrite: Negative   Leuk Esterase: Trace / RBC: 0-2 /HPF / WBC 3-5   Sq Epi: x / Non Sq Epi: x / Bacteria: x      CAPILLARY BLOOD GLUCOSE  190 (12 Sep 2017 18:16)                RADIOLOGY & ADDITIONAL TESTS:  < from: CT Head No Cont (17 @ 13:38) >  XAM:  CT BRAIN                            PROCEDURE DATE:  2017          INTERPRETATION:  History: Altered mental status.  Noncontrast head CT. No priors.    Age-appropriate parenchymal volume loss.  There is a segmental area of low-attenuation 4.3 x 2.4 cm left occipital   gray and white matter with effacement of the regional sulci consistent   with acute or subacute infarct. Questionable small amount of petechial   hemorrhage. No significant mass effect. No other ischemic or hemorrhagic   foci noted. No extra-axial collections. Clear sinuses. Cranium intact    Impression:    Acute/subacute left PCA infarct. Possible trace petechial hemorrhage.    Discussed with Dr. Davila prior to this dictation                      SOPHIA PALOMINO M.D., ATTENDING RADIOLOGIST  This document has been electronically signed. Sep 12 2017  1:59PM                < end of copied text >        Consultant(s) Notes Reviewed:  [ x] YES  [ ] NO    Care Discussed with Consultants/Other Providers [x ] YES  [ ] NO    Advanced Care Planning Discussed with Patien/Family [x ] YES  [ ] NO

## 2017-09-13 NOTE — DIETITIAN INITIAL EVALUATION ADULT. - OTHER INFO
+L PCA infarct- seen by SLP, Dysphagia III, thin liquids recommended. Pt checks sugars qam, sometimes HS if feels shaky.

## 2017-09-13 NOTE — PROGRESS NOTE ADULT - SUBJECTIVE AND OBJECTIVE BOX
Interval events:     Review of Systems:  Constitutional: no fever, chills, fatigue  Neuro: no headache, numbness, weakness  Resp: no cough, wheezing, shortness of breath  CVS: no chest pain, palpitations, leg swelling  GI: no abdominal pain, nausea, vomiting, diarrhea   : no dysuria, frequency, incontinence  Skin: no itching, burning, rashes, or lesions   Msk: no joint pain or swelling  Psych: no depression, anxiety    T(F): 98.2 (17 @ 04:01), Max: 99.5 (17 @ 14:25)  HR: 100 (17 @ 06:00) (99 - 111)  BP: 105/60 (17 @ 06:00) (96/56 - 149/83)  RR: 38 (17 @ 06:00) (18 - 40)  SpO2: 100% (17 @ 06:00) (96% - 100%)  Wt(kg): --        CAPILLARY BLOOD GLUCOSE  190 (12 Sep 2017 18:16)        I&O's Summary    12 Sep 2017 07:01  -  13 Sep 2017 07:00  --------------------------------------------------------  IN: 1900 mL / OUT: 0 mL / NET: 1900 mL        Physical Exam:     Gen:  Neuro:  HEENT:  CV:  Pulm:  GI:  Ext:  Skin:    Meds:  enoxaparin Injectable 40 milliGRAM(s) SubCutaneous daily    piperacillin/tazobactam IVPB. 3.375 Gram(s) IV Intermittent every 8 hours  vancomycin  IVPB 1000 milliGRAM(s) IV Intermittent every 12 hours  trimethoprim / sulfamethoxazole IVPB 300 milliGRAM(s) IV Intermittent every 8 hours    spironolactone 25 milliGRAM(s) Oral daily    methylPREDNISolone sodium succinate Injectable 40 milliGRAM(s) IV Push every 8 hours  simvastatin 10 milliGRAM(s) Oral at bedtime    buDESOnide   0.5 milliGRAM(s) Respule 0.5 milliGRAM(s) Inhalation two times a day  ALBUTerol    0.083% 2.5 milliGRAM(s) Nebulizer every 6 hours    LORazepam   Injectable 0.5 milliGRAM(s) IV Push every 6 hours PRN  aspirin 325 milliGRAM(s) Oral daily          lactated ringers. 1000 milliLiter(s) IV Continuous <Continuous>    influenza   Vaccine 0.5 milliLiter(s) IntraMuscular once                                    13.6   15.8  )-----------( 280      ( 12 Sep 2017 12:57 )             43.5           140  |  98  |  20  ----------------------------<  177<H>  4.8   |  32<H>  |  0.62    Ca    9.4      12 Sep 2017 12:57    TPro  7.0  /  Alb  3.6  /  TBili  0.8  /  DBili  x   /  AST  19  /  ALT  23  /  AlkPhos  59      Lactate 1.6            @ 12:57            Urinalysis Basic - ( 12 Sep 2017 16:45 )    Color: Yellow / Appearance: Clear / S.010 / pH: x  Gluc: x / Ketone: Moderate  / Bili: Negative / Urobili: Negative   Blood: x / Protein: Negative / Nitrite: Negative   Leuk Esterase: Trace / RBC: 0-2 /HPF / WBC 3-5   Sq Epi: x / Non Sq Epi: x / Bacteria: x          Rapid RVP Result: NotDetec ( @ 20:06)    ABG - ( 13 Sep 2017 05:30 )  pH: 7.44  /  pCO2: 51    /  pO2: 98    / HCO3: 33    / Base Excess: 9.5   /  SaO2: 95                  Radiology: ***    Bedside Lung U/S: ***    Bedside Cardiac U/S: ***    CENTRAL LINE: Y/N   DATE INSERTED:   REMOVE: Y/N    MARI: Y/N      DATE INSERTED:        REMOVE: Y/N    A-LINE: Y/N     DATE INSERTED:              REMOVE: Y/N    GLOBAL ISSUE/BEST PRACTICE:  Analgesia:  Sedation:  HOB elevation: yes  Stress ulcer prophylaxis:  VTE prophylaxis:  Glycemic control:  Nutrition:    CODE STATUS: *** Interval events: Patient admitted to the ICU for respiratory distress.    Review of Systems:  Constitutional: no fever, chills, fatigue  Neuro: no headache, numbness, weakness  Resp: SOB improved; no cough, wheezing  CVS: no chest pain, palpitations, leg swelling  GI: no abdominal pain, nausea, vomiting, diarrhea   : + incontinence; no dysuria, frequency  Skin: no itching, burning, rashes, or lesions   Msk: no joint pain or swelling    T(F): 98.2 (09-13-17 @ 04:01), Max: 99.5 (09-12-17 @ 14:25)  HR: 100 (09-13-17 @ 06:00) (99 - 111)  BP: 105/60 (09-13-17 @ 06:00) (96/56 - 149/83)  RR: 38 (09-13-17 @ 06:00) (18 - 40)  SpO2: 100% (09-13-17 @ 06:00) (96% - 100%)        I&O's Summary    12 Sep 2017 07:01  -  13 Sep 2017 07:00  --------------------------------------------------------  IN: 1900 mL / OUT: 0 mL / NET: 1900 mL        Physical Exam:     Gen:  Neuro:  HEENT:  CV:  Pulm:  GI:  Ext:  Skin:    Meds:  enoxaparin Injectable 40 milliGRAM(s) SubCutaneous daily    piperacillin/tazobactam IVPB. 3.375 Gram(s) IV Intermittent every 8 hours  vancomycin  IVPB 1000 milliGRAM(s) IV Intermittent every 12 hours  trimethoprim / sulfamethoxazole IVPB 300 milliGRAM(s) IV Intermittent every 8 hours    spironolactone 25 milliGRAM(s) Oral daily    methylPREDNISolone sodium succinate Injectable 40 milliGRAM(s) IV Push every 8 hours  simvastatin 10 milliGRAM(s) Oral at bedtime    buDESOnide   0.5 milliGRAM(s) Respule 0.5 milliGRAM(s) Inhalation two times a day  ALBUTerol    0.083% 2.5 milliGRAM(s) Nebulizer every 6 hours    LORazepam   Injectable 0.5 milliGRAM(s) IV Push every 6 hours PRN  aspirin 325 milliGRAM(s) Oral daily      lactated ringers. 1000 milliLiter(s) IV Continuous <Continuous>    influenza   Vaccine 0.5 milliLiter(s) IntraMuscular once                                    11.5   8.9  )-----------( 232      ( 13 Sep 2017 06:47 )             36.1       09-13    138  |  97  |  16  ----------------------------<  200<H>  4.7   |  35<H>  |  0.55      Ca    8.7      13 Sep 2017 06:47  Phos Level, Serum: 3.2 mg/dL (09.13.17 @ 06:47)    Lactate Dehydrogenase, Serum: 264 U/L (09.13.17 @ 08:28)    Hemoglobin A1C: 7.0    Lipid Profile in AM (09.13.17 @ 08:28)    HDL/Total Cholesterol Ratio Measurement: 2.7    Cholesterol, Serum: 159 mg/dL    Triglycerides, Serum: 90 mg/dL    HDL Cholesterol, Serum: 58 mg/dL    Direct LDL: 83      ABG - ( 13 Sep 2017 05:30 )  pH: 7.44  /  pCO2: 51    /  pO2: 98    / HCO3: 33    / Base Excess: 9.5   /  SaO2: 95            CENTRAL LINE: N    MARI: N    A-LINE: N    GLOBAL ISSUE/BEST PRACTICE:  Analgesia: no  Sedation: no  HOB elevation: yes  Stress ulcer prophylaxis: yes  VTE prophylaxis: yes  Glycemic control: no  Nutrition: dysphagia diet    CODE STATUS: DNR/DNI Interval events: Patient admitted to the ICU for respiratory distress.    Review of Systems:  Constitutional: no fever, chills, fatigue  Neuro: no headache, numbness, weakness  Resp: SOB improved; no cough, wheezing  CVS: no chest pain, palpitations, leg swelling  GI: no abdominal pain, nausea, vomiting, diarrhea   : + incontinence; no dysuria, frequency  Skin: no itching, burning, rashes, or lesions   Msk: no joint pain or swelling    T(F): 98.2 (09-13-17 @ 04:01), Max: 99.5 (09-12-17 @ 14:25)  HR: 100 (09-13-17 @ 06:00) (99 - 111)  BP: 105/60 (09-13-17 @ 06:00) (96/56 - 149/83)  RR: 38 (09-13-17 @ 06:00) (18 - 40)  SpO2: 100% (09-13-17 @ 06:00) (96% - 100%)        I&O's Summary    12 Sep 2017 07:01  -  13 Sep 2017 07:00  --------------------------------------------------------  IN: 1900 mL / OUT: 0 mL / NET: 1900 mL        Physical Exam:     Gen: laying in bed comfortably, becomes anxious easily  Neuro: A&Ox3  HEENT: JAE, EOMI BL  CV: +S1S2, tachycardic, no m/r/g  Pulm: fine crackles throughout lower lung fields  GI: soft, NT, right-sided hernia appreciated, +BSx4  Ext: no C/C/E, 2+ peripheral pulses  Skin: warm, normal color, no rashes or bruises    Meds:  enoxaparin Injectable 40 milliGRAM(s) SubCutaneous daily    piperacillin/tazobactam IVPB. 3.375 Gram(s) IV Intermittent every 8 hours  vancomycin  IVPB 1000 milliGRAM(s) IV Intermittent every 12 hours  trimethoprim / sulfamethoxazole IVPB 300 milliGRAM(s) IV Intermittent every 8 hours    spironolactone 25 milliGRAM(s) Oral daily    methylPREDNISolone sodium succinate Injectable 40 milliGRAM(s) IV Push every 8 hours  simvastatin 10 milliGRAM(s) Oral at bedtime    buDESOnide   0.5 milliGRAM(s) Respule 0.5 milliGRAM(s) Inhalation two times a day  ALBUTerol    0.083% 2.5 milliGRAM(s) Nebulizer every 6 hours    LORazepam   Injectable 0.5 milliGRAM(s) IV Push every 6 hours PRN  aspirin 325 milliGRAM(s) Oral daily      lactated ringers. 1000 milliLiter(s) IV Continuous <Continuous>    influenza   Vaccine 0.5 milliLiter(s) IntraMuscular once                                    11.5   8.9  )-----------( 232      ( 13 Sep 2017 06:47 )             36.1       09-13    138  |  97  |  16  ----------------------------<  200<H>  4.7   |  35<H>  |  0.55      Ca    8.7      13 Sep 2017 06:47  Phos Level, Serum: 3.2 mg/dL (09.13.17 @ 06:47)    Lactate Dehydrogenase, Serum: 264 U/L (09.13.17 @ 08:28)    Hemoglobin A1C: 7.0    Lipid Profile in AM (09.13.17 @ 08:28)    HDL/Total Cholesterol Ratio Measurement: 2.7    Cholesterol, Serum: 159 mg/dL    Triglycerides, Serum: 90 mg/dL    HDL Cholesterol, Serum: 58 mg/dL    Direct LDL: 83      ABG - ( 13 Sep 2017 05:30 )  pH: 7.44  /  pCO2: 51    /  pO2: 98    / HCO3: 33    / Base Excess: 9.5   /  SaO2: 95            CENTRAL LINE: N    MARI: N    A-LINE: N    GLOBAL ISSUE/BEST PRACTICE:  Analgesia: no  Sedation: no  HOB elevation: yes  Stress ulcer prophylaxis: yes  VTE prophylaxis: yes  Glycemic control: no  Nutrition: dysphagia diet    CODE STATUS: DNR/DNI Interval events: Patient admitted to the ICU for acute hypoxemic respiratory failure requiring BiPAP  this am off BiPAP and tolerating 6L NC    Review of Systems:  Constitutional: no fever, chills, fatigue  Neuro: no headache, numbness, weakness  Resp: SOB improved; no cough, wheezing  CVS: no chest pain, palpitations, leg swelling  GI: no abdominal pain, nausea, vomiting, diarrhea   : + incontinence; no dysuria, frequency  Skin: no itching, burning, rashes, or lesions   Msk: no joint pain or swelling    T(F): 98.2 (09-13-17 @ 04:01), Max: 99.5 (09-12-17 @ 14:25)  HR: 100 (09-13-17 @ 06:00) (99 - 111)  BP: 105/60 (09-13-17 @ 06:00) (96/56 - 149/83)  RR: 38 (09-13-17 @ 06:00) (18 - 40)  SpO2: 100% (09-13-17 @ 06:00) (96% - 100%)        I&O's Summary    12 Sep 2017 07:01  -  13 Sep 2017 07:00  --------------------------------------------------------  IN: 1900 mL / OUT: 0 mL / NET: 1900 mL        Physical Exam:     Gen: laying in bed comfortably, becomes anxious easily  Neuro: A&Ox3  HEENT: JAE, EOMI BL  CV: +S1S2, tachycardic, no m/r/g  Pulm: fine crackles throughout lower lung fields  GI: soft, NT, right-sided hernia appreciated, +BSx4  Ext: no C/C/E, 2+ peripheral pulses  Skin: warm, normal color, no rashes or bruises    Meds:  enoxaparin Injectable 40 milliGRAM(s) SubCutaneous daily    piperacillin/tazobactam IVPB. 3.375 Gram(s) IV Intermittent every 8 hours  vancomycin  IVPB 1000 milliGRAM(s) IV Intermittent every 12 hours  trimethoprim / sulfamethoxazole IVPB 300 milliGRAM(s) IV Intermittent every 8 hours    spironolactone 25 milliGRAM(s) Oral daily    methylPREDNISolone sodium succinate Injectable 40 milliGRAM(s) IV Push every 8 hours  simvastatin 10 milliGRAM(s) Oral at bedtime    buDESOnide   0.5 milliGRAM(s) Respule 0.5 milliGRAM(s) Inhalation two times a day  ALBUTerol    0.083% 2.5 milliGRAM(s) Nebulizer every 6 hours    LORazepam   Injectable 0.5 milliGRAM(s) IV Push every 6 hours PRN  aspirin 325 milliGRAM(s) Oral daily      lactated ringers. 1000 milliLiter(s) IV Continuous <Continuous>    influenza   Vaccine 0.5 milliLiter(s) IntraMuscular once                          11.5   8.9  )-----------( 232      ( 13 Sep 2017 06:47 )             36.1       09-13    138  |  97  |  16  ----------------------------<  200<H>  4.7   |  35<H>  |  0.55      Ca    8.7      13 Sep 2017 06:47  Phos Level, Serum: 3.2 mg/dL (09.13.17 @ 06:47)    Lactate Dehydrogenase, Serum: 264 U/L (09.13.17 @ 08:28)    Hemoglobin A1C: 7.0    Lipid Profile in AM (09.13.17 @ 08:28)    HDL/Total Cholesterol Ratio Measurement: 2.7    Cholesterol, Serum: 159 mg/dL    Triglycerides, Serum: 90 mg/dL    HDL Cholesterol, Serum: 58 mg/dL    Direct LDL: 83      ABG - ( 13 Sep 2017 05:30 )  pH: 7.44  /  pCO2: 51    /  pO2: 98    / HCO3: 33    / Base Excess: 9.5   /  SaO2: 95            CENTRAL LINE: N    MARI: N    A-LINE: N    GLOBAL ISSUE/BEST PRACTICE:  Analgesia: no  Sedation: no  HOB elevation: yes  Stress ulcer prophylaxis: yes  VTE prophylaxis: yes  Glycemic control: no  Nutrition: dysphagia diet    CODE STATUS: DNR/DNI

## 2017-09-13 NOTE — GOALS OF CARE CONVERSATION - PERSONAL ADVANCE DIRECTIVE - CONVERSATION DETAILS
met pt, verified hcp, copy of molst from 01/20/17, pt A&O at present. pt able to instruct me to tell her  where the original molst form is at home, specific piece of furniture where located. contacted pt spouse on phone, will attempt to locate molst form. PC will follow.

## 2017-09-13 NOTE — SWALLOW BEDSIDE ASSESSMENT ADULT - SWALLOW EVAL: RECOMMENDED FEEDING/EATING TECHNIQUES
alternate food with liquid/check mouth frequently for oral residue/pocketing/hard swallow w/ each bite or sip/no straws/crush medication (when feasible)/maintain upright posture during/after eating for 30 mins/oral hygiene

## 2017-09-13 NOTE — GOALS OF CARE CONVERSATION - PERSONAL ADVANCE DIRECTIVE - NS PRO AD PATIENT TYPE ON CHART
Medical Orders for Life-Sustaining Treatment (MOLST)/Health Care Proxy (HCP)/Do Not Resuscitate (DNR)/copy of molst

## 2017-09-13 NOTE — SWALLOW BEDSIDE ASSESSMENT ADULT - COMMENTS
Pt complains of worsening sob since last night requiring more o2 then usual, felt better on 8 liters, in er with bipap, also states had brief episode of AMS, SOB  Pt c discoordinated breathe / swallow pattern increasing aspiration risk

## 2017-09-13 NOTE — PROGRESS NOTE ADULT - ASSESSMENT
73 yo F w/ PMHx of chronic interstitial lung disease, pHTN, and DM admitted for respiratory distress and one episode of AMS.    Neuro:  - CT head suggests acute/subacute L PCA stroke.  - C/w ASA 325mg PO daily and simvastatin 10mg PO qHS.  - F/u BL carotid doppler and TTE. Consider MRI at a later time.  - PT eval ordered.  - Ativan discontinued.    CV:  - H/o pHTN; restart home regimen of Adcirca 40mg qD and Tyvaso according to user schedule.    Resp:  - C/w Cellcept 1000mg PO BID, Solumedrol 40mg q8h, albuterol 2.5mg q6h, budesonide 0.5mg BID.  - C/w home regimen of Azithromycin for inflammation and Sulfabactrim for PCP ppx.  - D/c lactated ringers, c/w aldactone 25mg PO daily.    GI:  - Speech and swallow recommends dysphagia with thin liquids diet.  - C/w omeprazole 40 mg daily.    :  - Stable.    ID:  - C/w Zosyn and vancomycin    Ext:  - VTE ppx w/ Lovenox 40mg subcut. daily.  - C/w SCDs. 73 yo F w/ PMHx of chronic interstitial lung disease, pHTN, and DM admitted for respiratory distress and one episode of AMS.    Neuro:  - CT head suggests acute/subacute L PCA stroke.  - C/w ASA 325mg PO daily and simvastatin 10mg PO qHS.  - F/u BL carotid doppler and TTE. Consider MRI at a later time.  - PT eval ordered.  - Ativan discontinued.    CV:  - H/o pHTN; restart home regimen of Adcirca 40mg qD and Tyvaso according to user schedule.    Resp:  - C/w Cellcept 1000mg PO BID, Solumedrol 40mg q8h, albuterol 2.5mg q6h, budesonide 0.5mg BID.  - C/w home regimen of Azithromycin for inflammation and Sulfabactrim for PCP ppx.  - D/c lactated ringers, c/w aldactone 25mg PO daily.    GI:  - Speech and swallow recommends dysphagia with thin liquids diet.  - C/w omeprazole 40 mg daily.    :  - Stable.    ID:  - C/w Zosyn and vancomycin  - Blood and urine cx's pending.    Ext:  - VTE ppx w/ Lovenox 40mg subcut. daily.  - C/w SCDs.

## 2017-09-13 NOTE — SWALLOW BEDSIDE ASSESSMENT ADULT - ORAL PHASE
Within functional limits/Delayed oral transit time Decreased anterior-posterior movement of the bolus

## 2017-09-13 NOTE — PROGRESS NOTE ADULT - SUBJECTIVE AND OBJECTIVE BOX
Date/Time Patient Seen:  		  Referring MD:   Data Reviewed	       Patient is a 72y old  Female who presents with a chief complaint of SOB x4 days, headache, forgetfulness (12 Sep 2017 19:33)  in bed  seen and examined  on BIPAP  awake  alert      Subjective/HPI     PAST MEDICAL & SURGICAL HISTORY:  Chronic interstitial lung disease  Pulmonary hypertension  DM (diabetes mellitus): pulmonary htn  History of cholecystectomy  H/O abdominal hysterectomy: 2002        Medication list         MEDICATIONS  (STANDING):  piperacillin/tazobactam IVPB. 3.375 Gram(s) IV Intermittent every 8 hours  vancomycin  IVPB 1000 milliGRAM(s) IV Intermittent every 12 hours  methylPREDNISolone sodium succinate Injectable 40 milliGRAM(s) IV Push every 8 hours  buDESOnide   0.5 milliGRAM(s) Respule 0.5 milliGRAM(s) Inhalation two times a day  ALBUTerol    0.083% 2.5 milliGRAM(s) Nebulizer every 6 hours  trimethoprim / sulfamethoxazole IVPB 300 milliGRAM(s) IV Intermittent every 8 hours  lactated ringers. 1000 milliLiter(s) (50 mL/Hr) IV Continuous <Continuous>  enoxaparin Injectable 40 milliGRAM(s) SubCutaneous daily  influenza   Vaccine 0.5 milliLiter(s) IntraMuscular once  aspirin 325 milliGRAM(s) Oral daily  simvastatin 10 milliGRAM(s) Oral at bedtime  spironolactone 25 milliGRAM(s) Oral daily    MEDICATIONS  (PRN):  LORazepam   Injectable 0.5 milliGRAM(s) IV Push every 6 hours PRN dyspnea, resp distress         Vitals log        ICU Vital Signs Last 24 Hrs  T(C): 36.8 (13 Sep 2017 04:01), Max: 37.5 (12 Sep 2017 14:25)  T(F): 98.2 (13 Sep 2017 04:01), Max: 99.5 (12 Sep 2017 14:25)  HR: 101 (13 Sep 2017 05:20) (99 - 111)  BP: 103/62 (13 Sep 2017 05:00) (96/56 - 149/83)  BP(mean): 74 (13 Sep 2017 05:00) (69 - 88)  ABP: --  ABP(mean): --  RR: 32 (13 Sep 2017 05:00) (18 - 40)  SpO2: 100% (13 Sep 2017 05:20) (96% - 100%)           Input and Output:  I&O's Detail    12 Sep 2017 07:01  -  13 Sep 2017 06:16  --------------------------------------------------------  IN:    IV PiggyBack: 1350 mL    lactated ringers.: 500 mL  Total IN: 1850 mL    OUT:  Total OUT: 0 mL    Total NET: 1850 mL          Lab Data                        13.6   15.8  )-----------( 280      ( 12 Sep 2017 12:57 )             43.5     09-12    140  |  98  |  20  ----------------------------<  177<H>  4.8   |  32<H>  |  0.62    Ca    9.4      12 Sep 2017 12:57    TPro  7.0  /  Alb  3.6  /  TBili  0.8  /  DBili  x   /  AST  19  /  ALT  23  /  AlkPhos  59  09-12    ABG - ( 13 Sep 2017 05:30 )  pH: 7.44  /  pCO2: 51    /  pO2: 98    / HCO3: 33    / Base Excess: 9.5   /  SaO2: 95                      Review of Systems	      Objective     Physical Examination    head at  kyphosis  alert  verbal  lungs - dec BS  abd - soft      Pertinent Lab findings & Imaging      Francie:  NO   Adequate UO     I&O's Detail    12 Sep 2017 07:01  -  13 Sep 2017 06:16  --------------------------------------------------------  IN:    IV PiggyBack: 1350 mL    lactated ringers.: 500 mL  Total IN: 1850 mL    OUT:  Total OUT: 0 mL    Total NET: 1850 mL               Discussed with:     Cultures:	        Radiology

## 2017-09-14 DIAGNOSIS — E11.9 TYPE 2 DIABETES MELLITUS WITHOUT COMPLICATIONS: ICD-10-CM

## 2017-09-14 LAB
-  AMPICILLIN: SIGNIFICANT CHANGE UP
-  CIPROFLOXACIN: SIGNIFICANT CHANGE UP
-  NITROFURANTOIN: SIGNIFICANT CHANGE UP
-  TETRACYCLINE: SIGNIFICANT CHANGE UP
-  VANCOMYCIN: SIGNIFICANT CHANGE UP
ANION GAP SERPL CALC-SCNC: 6 MMOL/L — SIGNIFICANT CHANGE UP (ref 5–17)
BUN SERPL-MCNC: 19 MG/DL — SIGNIFICANT CHANGE UP (ref 7–23)
CALCIUM SERPL-MCNC: 8.6 MG/DL — SIGNIFICANT CHANGE UP (ref 8.5–10.1)
CHLORIDE SERPL-SCNC: 98 MMOL/L — SIGNIFICANT CHANGE UP (ref 96–108)
CO2 SERPL-SCNC: 35 MMOL/L — HIGH (ref 22–31)
CREAT SERPL-MCNC: 0.54 MG/DL — SIGNIFICANT CHANGE UP (ref 0.5–1.3)
CULTURE RESULTS: SIGNIFICANT CHANGE UP
GLUCOSE SERPL-MCNC: 194 MG/DL — HIGH (ref 70–99)
GRAM STN FLD: SIGNIFICANT CHANGE UP
HCT VFR BLD CALC: 34.7 % — SIGNIFICANT CHANGE UP (ref 34.5–45)
HGB BLD-MCNC: 10.8 G/DL — LOW (ref 11.5–15.5)
MAGNESIUM SERPL-MCNC: 2.1 MG/DL — SIGNIFICANT CHANGE UP (ref 1.6–2.6)
MCHC RBC-ENTMCNC: 30.4 PG — SIGNIFICANT CHANGE UP (ref 27–34)
MCHC RBC-ENTMCNC: 31.2 GM/DL — LOW (ref 32–36)
MCV RBC AUTO: 97.3 FL — SIGNIFICANT CHANGE UP (ref 80–100)
METHOD TYPE: SIGNIFICANT CHANGE UP
ORGANISM # SPEC MICROSCOPIC CNT: SIGNIFICANT CHANGE UP
ORGANISM # SPEC MICROSCOPIC CNT: SIGNIFICANT CHANGE UP
PHOSPHATE SERPL-MCNC: 3.6 MG/DL — SIGNIFICANT CHANGE UP (ref 2.5–4.5)
PLATELET # BLD AUTO: 220 K/UL — SIGNIFICANT CHANGE UP (ref 150–400)
POTASSIUM SERPL-MCNC: 4.4 MMOL/L — SIGNIFICANT CHANGE UP (ref 3.5–5.3)
POTASSIUM SERPL-SCNC: 4.4 MMOL/L — SIGNIFICANT CHANGE UP (ref 3.5–5.3)
RBC # BLD: 3.57 M/UL — LOW (ref 3.8–5.2)
RBC # FLD: 13.8 % — SIGNIFICANT CHANGE UP (ref 10.3–14.5)
SODIUM SERPL-SCNC: 139 MMOL/L — SIGNIFICANT CHANGE UP (ref 135–145)
SPECIMEN SOURCE: SIGNIFICANT CHANGE UP
SPECIMEN SOURCE: SIGNIFICANT CHANGE UP
VANCOMYCIN TROUGH SERPL-MCNC: 12.4 UG/ML — SIGNIFICANT CHANGE UP (ref 10–20)
WBC # BLD: 8.3 K/UL — SIGNIFICANT CHANGE UP (ref 3.8–10.5)
WBC # FLD AUTO: 8.3 K/UL — SIGNIFICANT CHANGE UP (ref 3.8–10.5)

## 2017-09-14 PROCEDURE — 99233 SBSQ HOSP IP/OBS HIGH 50: CPT | Mod: GC

## 2017-09-14 RX ORDER — OMEPRAZOLE 10 MG/1
40 CAPSULE, DELAYED RELEASE ORAL DAILY
Qty: 0 | Refills: 0 | Status: DISCONTINUED | OUTPATIENT
Start: 2017-09-14 | End: 2017-09-22

## 2017-09-14 RX ADMIN — ALBUTEROL 2.5 MILLIGRAM(S): 90 AEROSOL, METERED ORAL at 07:23

## 2017-09-14 RX ADMIN — Medication 40 MILLIGRAM(S): at 17:20

## 2017-09-14 RX ADMIN — Medication 250 MILLIGRAM(S): at 10:14

## 2017-09-14 RX ADMIN — PIPERACILLIN AND TAZOBACTAM 25 GRAM(S): 4; .5 INJECTION, POWDER, LYOPHILIZED, FOR SOLUTION INTRAVENOUS at 22:01

## 2017-09-14 RX ADMIN — MYCOPHENOLATE MOFETIL 1000 MILLIGRAM(S): 250 CAPSULE ORAL at 05:19

## 2017-09-14 RX ADMIN — PIPERACILLIN AND TAZOBACTAM 25 GRAM(S): 4; .5 INJECTION, POWDER, LYOPHILIZED, FOR SOLUTION INTRAVENOUS at 13:59

## 2017-09-14 RX ADMIN — Medication 325 MILLIGRAM(S): at 12:07

## 2017-09-14 RX ADMIN — Medication 600 MILLIGRAM(S): at 05:18

## 2017-09-14 RX ADMIN — ENOXAPARIN SODIUM 40 MILLIGRAM(S): 100 INJECTION SUBCUTANEOUS at 12:07

## 2017-09-14 RX ADMIN — TADALAFIL 40 MILLIGRAM(S): 10 TABLET, FILM COATED ORAL at 12:08

## 2017-09-14 RX ADMIN — PIPERACILLIN AND TAZOBACTAM 25 GRAM(S): 4; .5 INJECTION, POWDER, LYOPHILIZED, FOR SOLUTION INTRAVENOUS at 05:18

## 2017-09-14 RX ADMIN — Medication 40 MILLIGRAM(S): at 05:18

## 2017-09-14 RX ADMIN — Medication 0.5 MILLIGRAM(S): at 19:51

## 2017-09-14 RX ADMIN — ALBUTEROL 2.5 MILLIGRAM(S): 90 AEROSOL, METERED ORAL at 19:51

## 2017-09-14 RX ADMIN — Medication 1 TABLET(S): at 12:17

## 2017-09-14 RX ADMIN — ALBUTEROL 2.5 MILLIGRAM(S): 90 AEROSOL, METERED ORAL at 01:04

## 2017-09-14 RX ADMIN — ALBUTEROL 2.5 MILLIGRAM(S): 90 AEROSOL, METERED ORAL at 13:55

## 2017-09-14 RX ADMIN — Medication 0.5 MILLIGRAM(S): at 07:24

## 2017-09-14 RX ADMIN — Medication 600 MILLIGRAM(S): at 17:19

## 2017-09-14 RX ADMIN — SPIRONOLACTONE 25 MILLIGRAM(S): 25 TABLET, FILM COATED ORAL at 05:18

## 2017-09-14 RX ADMIN — SIMVASTATIN 20 MILLIGRAM(S): 20 TABLET, FILM COATED ORAL at 22:01

## 2017-09-14 RX ADMIN — MYCOPHENOLATE MOFETIL 1000 MILLIGRAM(S): 250 CAPSULE ORAL at 17:20

## 2017-09-14 NOTE — PHYSICAL THERAPY INITIAL EVALUATION ADULT - DID THE PATIENT HAVE SURGERY?
CT head: Acute/Subacute left PCA infarct. Possible trace petechial hemorrhage; chest X-ray: Generalized interstitial lung disease/n/a

## 2017-09-14 NOTE — PROGRESS NOTE ADULT - SUBJECTIVE AND OBJECTIVE BOX
Patient is a 72y old  Female who presents with a chief complaint of SOB x4 days, headache, forgetfulness (12 Sep 2017 19:33)      INTERVAL HPI/OVERNIGHT EVENTS:pt looks better today, less confused, breathing better    MEDICATIONS  (STANDING):  piperacillin/tazobactam IVPB. 3.375 Gram(s) IV Intermittent every 8 hours  buDESOnide   0.5 milliGRAM(s) Respule 0.5 milliGRAM(s) Inhalation two times a day  ALBUTerol    0.083% 2.5 milliGRAM(s) Nebulizer every 6 hours  enoxaparin Injectable 40 milliGRAM(s) SubCutaneous daily  influenza   Vaccine 0.5 milliLiter(s) IntraMuscular once  aspirin 325 milliGRAM(s) Oral daily  spironolactone 25 milliGRAM(s) Oral daily  mycophenolate mofetil 1000 milliGRAM(s) Oral two times a day  tadalafil 40 milliGRAM(s) Oral daily  tyvaso inhalation 7 Puff(s) 7 Puff(s) Inhalation four times a day  simvastatin 20 milliGRAM(s) Oral at bedtime  guaiFENesin  milliGRAM(s) Oral every 12 hours  methylPREDNISolone sodium succinate Injectable 40 milliGRAM(s) IV Push every 12 hours  trimethoprim  160 mG/sulfamethoxazole 800 mG 1 Tablet(s) Oral every other day    MEDICATIONS  (PRN):      Allergies    No Known Allergies    Intolerances        REVIEW OF SYSTEMS:  CONSTITUTIONAL: No fever, weight loss, or fatigue  EYES: No eye pain, visual disturbances  ENMT:  No difficulty hearing, tinnitus, vertigo; No sinus or throat pain  NECK: No pain or stiffness  RESPIRATORY: less sob  CARDIOVASCULAR: No chest pain, palpitations, dizziness  GASTROINTESTINAL: No abdominal or epigastric pain. No nausea, vomiting, or hematemesis; No diarrhea or constipation. No melena or hematochezia.  GENITOURINARY: No dysuria, frequency, hematuria, or incontinence  NEUROLOGICAL: less confused, more alert and awake  SKIN: No itching, burning  LYMPH NODES: No enlarged glands  MUSCULOSKELETAL: No joint pain or swelling; No muscle, back, or extremity pain  PSYCHIATRIC: No depression, mood swings  HEME/LYMPH: No easy bruising, or bleeding gums  ALLERGY AND IMMUNOLOGIC: No hives    Vital Signs Last 24 Hrs  T(C): 37 (14 Sep 2017 11:17), Max: 37 (14 Sep 2017 11:17)  T(F): 98.6 (14 Sep 2017 11:17), Max: 98.6 (14 Sep 2017 11:17)  HR: 98 (14 Sep 2017 11:00) (90 - 111)  BP: 100/62 (14 Sep 2017 11:00) (81/53 - 116/58)  BP(mean): 76 (14 Sep 2017 11:00) (60 - 83)  RR: 33 (14 Sep 2017 11:00) (12 - 51)  SpO2: 100% (14 Sep 2017 11:00) (87% - 100%)    PHYSICAL EXAM:  GENERAL: NAD, well-groomed, well-developed  HEAD:  Atraumatic, Normocephalic  EYES: EOMI, PERRLA, conjunctiva and sclera clear  ENMT: No tonsillar erythema, exudates, or enlargement   NECK: Supple, No JVD  NERVOUS SYSTEM:  Alert & Oriented X2  CHEST/LUNG: no wheezing, b/l rhonci  HEART:tachy  ABDOMEN: Soft, Nontender, Nondistended; Bowel sounds present  EXTREMITIES:  2+ Peripheral Pulses   LYMPH: No lymphadenopathy noted  SKIN: No rashes     LABS:                        10.8   8.3   )-----------( 220      ( 14 Sep 2017 06:37 )             34.7     14 Sep 2017 06:37    139    |  98     |  19     ----------------------------<  194    4.4     |  35     |  0.54     Ca    8.6        14 Sep 2017 06:37  Phos  3.6       14 Sep 2017 06:37  Mg     2.1       14 Sep 2017 06:37        Urinalysis Basic - ( 12 Sep 2017 16:45 )    Color: Yellow / Appearance: Clear / S.010 / pH: x  Gluc: x / Ketone: Moderate  / Bili: Negative / Urobili: Negative   Blood: x / Protein: Negative / Nitrite: Negative   Leuk Esterase: Trace / RBC: 0-2 /HPF / WBC 3-5   Sq Epi: x / Non Sq Epi: x / Bacteria: x      CAPILLARY BLOOD GLUCOSE                RADIOLOGY & ADDITIONAL TESTS:    no new    Consultant(s) Notes Reviewed:  [x ] YES  [ ] NO    Care Discussed with Consultants/Other Providers [ x] YES  [ ] NO    Advanced Care Planning Discussed with Patien/Family [ ] YES  [x ] NO

## 2017-09-14 NOTE — PROGRESS NOTE ADULT - SUBJECTIVE AND OBJECTIVE BOX
Date/Time Patient Seen:  		  Referring MD:   Data Reviewed	       Patient is a 72y old  Female who presents with a chief complaint of SOB x4 days, headache, forgetfulness (12 Sep 2017 19:33)  in bed  on aerosol mask  vs and meds reviewed  on emp ABX  on steroids  on nebs  on pulm htn regimen        Subjective/HPI     PAST MEDICAL & SURGICAL HISTORY:  Chronic interstitial lung disease  Pulmonary hypertension  DM (diabetes mellitus): pulmonary htn  History of cholecystectomy  H/O abdominal hysterectomy: 2002        Medication list         MEDICATIONS  (STANDING):  piperacillin/tazobactam IVPB. 3.375 Gram(s) IV Intermittent every 8 hours  vancomycin  IVPB 1000 milliGRAM(s) IV Intermittent every 12 hours  buDESOnide   0.5 milliGRAM(s) Respule 0.5 milliGRAM(s) Inhalation two times a day  ALBUTerol    0.083% 2.5 milliGRAM(s) Nebulizer every 6 hours  enoxaparin Injectable 40 milliGRAM(s) SubCutaneous daily  influenza   Vaccine 0.5 milliLiter(s) IntraMuscular once  aspirin 325 milliGRAM(s) Oral daily  spironolactone 25 milliGRAM(s) Oral daily  mycophenolate mofetil 1000 milliGRAM(s) Oral two times a day  tadalafil 40 milliGRAM(s) Oral daily  tyvaso inhalation 7 Puff(s) 7 Puff(s) Inhalation four times a day  simvastatin 20 milliGRAM(s) Oral at bedtime  guaiFENesin  milliGRAM(s) Oral every 12 hours  pantoprazole    Tablet 40 milliGRAM(s) Oral before breakfast  methylPREDNISolone sodium succinate Injectable 40 milliGRAM(s) IV Push every 12 hours    MEDICATIONS  (PRN):         Vitals log        ICU Vital Signs Last 24 Hrs  T(C): 36.7 (14 Sep 2017 04:18), Max: 36.8 (13 Sep 2017 07:29)  T(F): 98 (14 Sep 2017 04:18), Max: 98.3 (13 Sep 2017 15:28)  HR: 98 (14 Sep 2017 03:00) (96 - 109)  BP: 90/60 (14 Sep 2017 03:00) (81/53 - 117/62)  BP(mean): 69 (14 Sep 2017 03:00) (60 - 86)  ABP: --  ABP(mean): --  RR: 12 (14 Sep 2017 03:00) (12 - 47)  SpO2: 98% (14 Sep 2017 03:00) (87% - 100%)           Input and Output:  I&O's Detail    12 Sep 2017 07:01  -  13 Sep 2017 07:00  --------------------------------------------------------  IN:    IV PiggyBack: 1350 mL    lactated ringers.: 550 mL  Total IN: 1900 mL    OUT:  Total OUT: 0 mL    Total NET: 1900 mL      13 Sep 2017 07:01  -  14 Sep 2017 06:04  --------------------------------------------------------  IN:    IV PiggyBack: 250 mL    lactated ringers.: 150 mL    Oral Fluid: 150 mL    Solution: 100 mL  Total IN: 650 mL    OUT:  Total OUT: 0 mL    Total NET: 650 mL          Lab Data                        11.5   8.9   )-----------( 232      ( 13 Sep 2017 06:47 )             36.1     09-13    138  |  97  |  16  ----------------------------<  200<H>  4.7   |  35<H>  |  0.55    Ca    8.7      13 Sep 2017 06:47  Phos  3.2     09-13  Mg     1.9     09-13    TPro  7.0  /  Alb  3.6  /  TBili  0.8  /  DBili  x   /  AST  19  /  ALT  23  /  AlkPhos  59  09-12    ABG - ( 13 Sep 2017 05:30 )  pH: 7.44  /  pCO2: 51    /  pO2: 98    / HCO3: 33    / Base Excess: 9.5   /  SaO2: 95                      Review of Systems	      Objective     Physical Examination    head at  heart - s1s2  lungs - dec BS  abd - soft  kyphosis      Pertinent Lab findings & Imaging      Francie:  NO   Adequate UO     I&O's Detail    12 Sep 2017 07:01  -  13 Sep 2017 07:00  --------------------------------------------------------  IN:    IV PiggyBack: 1350 mL    lactated ringers.: 550 mL  Total IN: 1900 mL    OUT:  Total OUT: 0 mL    Total NET: 1900 mL      13 Sep 2017 07:01  -  14 Sep 2017 06:04  --------------------------------------------------------  IN:    IV PiggyBack: 250 mL    lactated ringers.: 150 mL    Oral Fluid: 150 mL    Solution: 100 mL  Total IN: 650 mL    OUT:  Total OUT: 0 mL    Total NET: 650 mL               Discussed with:     Cultures:	        Radiology

## 2017-09-14 NOTE — PHYSICAL THERAPY INITIAL EVALUATION ADULT - IMPAIRED TRANSFERS: SIT/STAND, REHAB EVAL
Endurance Poor +/impaired postural control/decreased flexibility/decreased strength/impaired balance

## 2017-09-14 NOTE — PROGRESS NOTE ADULT - ASSESSMENT
71yo F w/ PMHx of chronic interstitial lung disease, pHTN, and DM presenting with respiratory distress, acute/subacute L PCA infarct on CT, and one episode of AMS. Respiratory status has significantly improved.    Neuro:  -    CV:  - pHTN: c/w Adcirca and Tyvaso.    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  - Alternates use of NC, NIPPV, and BiPAP PRN.  - C/w home regimen of Cellcept, Solumedrol, albuterol, and budesonide.  - C/w home regimen of azithromycin and sulfatrim.  - C/w spironolactone.    GI:  - Dysphagia diet; c/t omeprazole.    :  - Stable.    ID:  -    Ext:  - 71yo F w/ PMHx of chronic interstitial lung disease, pHTN, and DM presenting with respiratory distress, acute/subacute L PCA infarct on CT, and one episode of AMS. Respiratory status has significantly improved.    Neuro:  - acute/subacute L PCA infarct.  - C/w ASA and statin.  - F/u TTE.  - BL carotid doppler ordered and MRI/MRA without contrast ordered.  - PT eval.    CV:  - pHTN: c/w Adcirca and Tyvaso.    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  - Alternates use of NC, NIPPV PRN and BiPAP qHS.  - C/w home regimen of Cellcept, Solumedrol, albuterol, and budesonide.  - C/w home regimen of azithromycin.  - Restart Bactrim for PCP ppx.  - C/w spironolactone.    GI:  - Dysphagia diet; c/t omeprazole.    :  - Stable.    ID:  - Blood and sputum cx's show no growth. Urine cx shows <100K E. faecalis.  - C/w Zosyn, D/c vanco.    Ext:  - C/w VTE ppx. 71yo F w/ PMHx of chronic interstitial lung disease, pHTN, and DM presenting with respiratory distress, acute/subacute L PCA infarct on CT, and one episode of AMS. Respiratory status has significantly improved.    Neuro:  - acute/subacute L PCA infarct.  - C/w ASA and statin.  - F/u TTE.  - BL carotid doppler ordered and MRI/MRA without contrast ordered.  - PT eval.    CV:  - pHTN: c/w Adcirca and Tyvaso.    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  - Alternates use of NC, currently 8L, and BiPAP.  - C/w BiPAP qHS.  - C/w home regimen of Cellcept, Solumedrol, albuterol, and budesonide.  - C/w home regimen of azithromycin.  - Restart Bactrim for PCP ppx.  - C/w spironolactone.    GI:  - Dysphagia diet; c/t omeprazole.    :  - Stable.    ID:  - Blood and sputum cx's show no growth. Urine cx shows <100K E. faecalis.  - C/w Zosyn, D/c vanco.    Ext:  - C/w VTE ppx.

## 2017-09-14 NOTE — PROGRESS NOTE ADULT - SUBJECTIVE AND OBJECTIVE BOX
Neurology Follow up note    DAVID [id number]    HPI:  Pt complains of worsening sob since last night requiring more o2 then usual, felt better on 8 liters, in er with bipap, also states had brief episode of ams. pt with significant sob going to icu for further monitoring. no prior hx of cva, has significant hx of pulm htn, all her doctors including pulm at [facility] (12 Sep 2017 17:35)      Interval History - doing better.    Patient is seen, chart was reviewed and case was discussed with the treatment team.  Pt is not in any distress.   Lying on bed comfortably.   No events reported overnight.   No clinical seizure was reported.  Sitting on chair bed comfortably.    is at bedside.    Vital Signs Last 24 Hrs  T(C): 37 (14 Sep 2017 11:17), Max: 37 (14 Sep 2017 11:17)  T(F): 98.6 (14 Sep 2017 11:17), Max: 98.6 (14 Sep 2017 11:17)  HR: 98 (14 Sep 2017 11:00) (90 - 111)  BP: 100/62 (14 Sep 2017 11:00) (81/53 - 116/58)  BP(mean): 76 (14 Sep 2017 11:00) (60 - 83)  RR: 33 (14 Sep 2017 11:00) (12 - 51)  SpO2: 100% (14 Sep 2017 11:00) (87% - 100%)        REVIEW OF SYSTEMS:    Constitutional: No fever, weight loss or fatigue  Eyes: No eye pain, visual disturbances, or discharge  ENT:  No difficulty hearing, tinnitus, vertigo; No sinus or throat pain  Neck: No pain or stiffness  Cardiovascular: No chest pain, palpitations, shortness of breath, dizziness or leg swelling  Gastrointestinal: No abdominal or epigastric pain. No nausea, vomiting or hematemesis; No diarrhea or constipation. No melena or hematochezia.  Genitourinary: No dysuria, frequency, hematuria or incontinence  Neurological: No headaches, memory loss, loss of strength, numbness or tremors  Psychiatric: No depression, anxiety, mood swings or difficulty sleeping  Musculoskeletal: No joint pain or swelling; No muscle, back or extremity pain  Skin: No itching, burning, rashes or lesions   Lymph Nodes: No enlarged glands  Endocrine: No heat or cold intolerance; No hair loss, No h/o diabetes or thyroid dysfunction  Allergy and Immunologic: No hives or eczema    On Neurological Examination:    Mental Status - Pt is alert, awake, oriented X3. Follows commands well and able to answer questions appropriatelyMood and affect  normal    Speech -  Normal.     Cranial Nerves - Pupils 3 mm equal and reactive to light, extraocular eye movements intact. Pt has right visual field deficit.  Pt has no  facial asymmetry. Facial sensation is intact.Tongue - is in midline.        Motor Exam - 5/5 of UE.  LE 4/5.    Sensory Exam - . Pt withdraws all extremities equally on stimulation. No asymmetry seen. No complaints of tingling, numbness.    coordination:    Finger to nose: normal.    Deep tendon Reflexes - 2 plus all over.        Romberg - Negative.    Neck Supple -  Yes.     MEDICATIONS    piperacillin/tazobactam IVPB. 3.375 Gram(s) IV Intermittent every 8 hours  buDESOnide   0.5 milliGRAM(s) Respule 0.5 milliGRAM(s) Inhalation two times a day  ALBUTerol    0.083% 2.5 milliGRAM(s) Nebulizer every 6 hours  enoxaparin Injectable 40 milliGRAM(s) SubCutaneous daily  influenza   Vaccine 0.5 milliLiter(s) IntraMuscular once  aspirin 325 milliGRAM(s) Oral daily  spironolactone 25 milliGRAM(s) Oral daily  mycophenolate mofetil 1000 milliGRAM(s) Oral two times a day  tadalafil 40 milliGRAM(s) Oral daily  tyvaso inhalation 7 Puff(s) 7 Puff(s) Inhalation four times a day  simvastatin 20 milliGRAM(s) Oral at bedtime  guaiFENesin  milliGRAM(s) Oral every 12 hours  methylPREDNISolone sodium succinate Injectable 40 milliGRAM(s) IV Push every 12 hours  trimethoprim  160 mG/sulfamethoxazole 800 mG 1 Tablet(s) Oral every other day      Allergies    No Known Allergies    Intolerances        LABS:  CBC Full  -  ( 14 Sep 2017 06:37 )  WBC Count : 8.3 K/uL  Hemoglobin : 10.8 g/dL  Hematocrit : 34.7 %  Platelet Count - Automated : 220 K/uL  Mean Cell Volume : 97.3 fl  Mean Cell Hemoglobin : 30.4 pg  Mean Cell Hemoglobin Concentration : 31.2 gm/dL  Auto Neutrophil # : x  Auto Lymphocyte # : x  Auto Monocyte # : x  Auto Eosinophil # : x  Auto Basophil # : x  Auto Neutrophil % : x  Auto Lymphocyte % : x  Auto Monocyte % : x  Auto Eosinophil % : x  Auto Basophil % : x    Urinalysis Basic - ( 12 Sep 2017 16:45 )    Color: Yellow / Appearance: Clear / S.010 / pH: x  Gluc: x / Ketone: Moderate  / Bili: Negative / Urobili: Negative   Blood: x / Protein: Negative / Nitrite: Negative   Leuk Esterase: Trace / RBC: 0-2 /HPF / WBC 3-5   Sq Epi: x / Non Sq Epi: x / Bacteria: x          139  |  98  |  19  ----------------------------<  194<H>  4.4   |  35<H>  |  0.54    Ca    8.6      14 Sep 2017 06:37  Phos  3.6       Mg     2.1         TPro  7.0  /  Alb  3.6  /  TBili  0.8  /  DBili  x   /  AST  19  /  ALT  23  /  AlkPhos  59  -    Hemoglobin A1C:     Vitamin B12     RADIOLOGY.    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  SP RESPIRATORY FAILURE.    CONTINUE ASA.  BRAIN MRI-MRA.  Physical therapy evaluation.  OOB to chair/ambulation with assistance only.  Plan of care was discussed with family. Questions answered.  Would continue to follow.

## 2017-09-14 NOTE — PROGRESS NOTE ADULT - SUBJECTIVE AND OBJECTIVE BOX
Interval events:     Review of Systems:  Constitutional: no fever, chills, fatigue  Neuro: no headache, numbness, weakness  Resp: no cough, wheezing, shortness of breath  CVS: no chest pain, palpitations, leg swelling  GI: no abdominal pain, nausea, vomiting, diarrhea   : no dysuria, frequency, incontinence  Skin: no itching, burning, rashes, or lesions   Msk: no joint pain or swelling  Psych: no depression, anxiety    T(F): 98 (09-14-17 @ 04:18), Max: 98.3 (09-13-17 @ 15:28)  HR: 97 (09-14-17 @ 06:00) (95 - 109)  BP: 101/64 (09-14-17 @ 06:00) (81/53 - 117/62)  RR: 28 (09-14-17 @ 06:00) (12 - 47)  SpO2: 97% (09-14-17 @ 06:00) (87% - 100%)        CAPILLARY BLOOD GLUCOSE  190 (12 Sep 2017 18:16)        I&O's Summary    12 Sep 2017 07:01  -  13 Sep 2017 07:00  --------------------------------------------------------  IN: 1900 mL / OUT: 0 mL / NET: 1900 mL    13 Sep 2017 07:01  -  14 Sep 2017 06:52  --------------------------------------------------------  IN: 1100 mL / OUT: 0 mL / NET: 1100 mL        Physical Exam:     Gen:  Neuro:  HEENT:  CV:  Pulm:  GI:  Ext:  Skin:    Meds:  enoxaparin Injectable 40 milliGRAM(s) SubCutaneous daily    piperacillin/tazobactam IVPB. 3.375 Gram(s) IV Intermittent every 8 hours  vancomycin  IVPB 1000 milliGRAM(s) IV Intermittent every 12 hours    spironolactone 25 milliGRAM(s) Oral daily  tadalafil 40 milliGRAM(s) Oral daily    simvastatin 20 milliGRAM(s) Oral at bedtime  methylPREDNISolone sodium succinate Injectable 40 milliGRAM(s) IV Push every 12 hours    buDESOnide   0.5 milliGRAM(s) Respule 0.5 milliGRAM(s) Inhalation two times a day  ALBUTerol    0.083% 2.5 milliGRAM(s) Nebulizer every 6 hours  guaiFENesin  milliGRAM(s) Oral every 12 hours    aspirin 325 milliGRAM(s) Oral daily    pantoprazole    Tablet 40 milliGRAM(s) Oral before breakfast          influenza   Vaccine 0.5 milliLiter(s) IntraMuscular once  mycophenolate mofetil 1000 milliGRAM(s) Oral two times a day      tyvaso inhalation 7 Puff(s) 7 Puff(s) Inhalation four times a day                                11.5   8.9   )-----------( 232      ( 13 Sep 2017 06:47 )             36.1       09-13    138  |  97  |  16  ----------------------------<  200<H>  4.7   |  35<H>  |  0.55    Ca    8.7      13 Sep 2017 06:47  Phos  3.2     09-13  Mg     1.9     09-13    TPro  7.0  /  Alb  3.6  /  TBili  0.8  /  DBili  x   /  AST  19  /  ALT  23  /  AlkPhos  59  09-12            Radiology: ***    Bedside Lung U/S: ***    Bedside Cardiac U/S: ***    CENTRAL LINE: N    MARI: N    A-LINE: N    GLOBAL ISSUE/BEST PRACTICE:  Analgesia: no  Sedation: no  HOB elevation: yes  Stress ulcer prophylaxis: yes  VTE prophylaxis: yes  Glycemic control: no  Nutrition: dysphagia diet    CODE STATUS: DNR Interval events: Overnight, patient asked to use BiPAP and slept well through the night with it.    Review of Systems:  Constitutional: no fever, chills, fatigue  Neuro: reports diminished R peripheral vision and sees occasional "flutters" in R periphery; no headache, numbness, weakness  Resp: no cough, shortness of breath on NC, NIPPV, or BiPAP  CVS: no chest pain, palpitations, leg swelling  GI: no abdominal pain, nausea, vomiting, diarrhea   : reports incontinence; no dysuria, frequency  Skin: no itching, burning, rashes, or lesions   Msk: no joint pain or swelling    T(F): 98 (09-14-17 @ 04:18), Max: 98.3 (09-13-17 @ 15:28)  HR: 97 (09-14-17 @ 06:00) (95 - 109)  BP: 101/64 (09-14-17 @ 06:00) (81/53 - 117/62)  RR: 28 (09-14-17 @ 06:00) (12 - 47)  SpO2: 97% (09-14-17 @ 06:00) (87% - 100%)      CAPILLARY BLOOD GLUCOSE  190 (12 Sep 2017 18:16)      I&O's Summary    13 Sep 2017 07:01  -  14 Sep 2017 06:52  --------------------------------------------------------  IN: 1100 mL / OUT: 0 mL / NET: 1100 mL        Physical Exam:     Gen: laying in bed comfortably, NAD  Neuro: A&Ox3, sensation intact, R peripheral vision deficit  HEENT: JAE, EOMI BL, moist mucous membranes  CV: +S1S2, no m/r/g  Pulm: fine crackles auscultated over lower lung fields  GI: soft, NTND, +BSx4  Ext: no C/C/E, 2+ peripheral pulses, 5/5 muscle strength  Skin: warm, normal color, no rashes or lesions    Meds:  enoxaparin Injectable 40 milliGRAM(s) SubCutaneous daily    piperacillin/tazobactam IVPB. 3.375 Gram(s) IV Intermittent every 8 hours  vancomycin  IVPB 1000 milliGRAM(s) IV Intermittent every 12 hours    spironolactone 25 milliGRAM(s) Oral daily  tadalafil 40 milliGRAM(s) Oral daily    simvastatin 20 milliGRAM(s) Oral at bedtime  methylPREDNISolone sodium succinate Injectable 40 milliGRAM(s) IV Push every 12 hours    buDESOnide   0.5 milliGRAM(s) Respule 0.5 milliGRAM(s) Inhalation two times a day  ALBUTerol    0.083% 2.5 milliGRAM(s) Nebulizer every 6 hours  guaiFENesin  milliGRAM(s) Oral every 12 hours    aspirin 325 milliGRAM(s) Oral daily    pantoprazole    Tablet 40 milliGRAM(s) Oral before breakfast          influenza   Vaccine 0.5 milliLiter(s) IntraMuscular once  mycophenolate mofetil 1000 milliGRAM(s) Oral two times a day      tyvaso inhalation 7 Puff(s) 7 Puff(s) Inhalation four times a day                              10.8   8.3   )-----------( 220      ( 14 Sep 2017 06:37 )             34.7       09-14    139  |  98  |  19  ----------------------------<  194<H>  4.4   |  35<H>  |  0.54        Culture - Sputum . (09.13.17 @ 16:52)    Gram Stain:   No polymorphonuclear leukocytes per low power field  No Squamous epithelial cells per low power field  No organisms seen per oil power field      Culture - Urine (09.12.17 @ 21:43)    Specimen Source: .Urine Clean Catch (Midstream)    Culture Results:   50,000 - 99,000 CFU/mL Enterococcus faecalis      Culture - Blood (09.12.17 @ 18:20)    Specimen Source: .Blood Blood-Peripheral    Culture Results:   No growth to date.      CENTRAL LINE: N    MARI: N    A-LINE: N    GLOBAL ISSUE/BEST PRACTICE:  Analgesia: no  Sedation: no  HOB elevation: yes  Stress ulcer prophylaxis: yes  VTE prophylaxis: yes  Glycemic control: no  Nutrition: dysphagia diet    CODE STATUS: DNR Interval events: Overnight, patient asked to use BiPAP and slept well through the night with it.    Review of Systems:  Constitutional: no fever, chills, fatigue  Neuro: reports diminished R peripheral vision and sees occasional "flutters" in R periphery; no headache, numbness, weakness  Resp: no cough, shortness of breath on NC, NIPPV, or BiPAP  CVS: no chest pain, palpitations, leg swelling  GI: no abdominal pain, nausea, vomiting, diarrhea   : reports incontinence; no dysuria, frequency  Skin: no itching, burning, rashes, or lesions   Msk: no joint pain or swelling    T(F): 98 (09-14-17 @ 04:18), Max: 98.3 (09-13-17 @ 15:28)  HR: 97 (09-14-17 @ 06:00) (95 - 109)  BP: 101/64 (09-14-17 @ 06:00) (81/53 - 117/62)  RR: 28 (09-14-17 @ 06:00) (12 - 47)  SpO2: 97% (09-14-17 @ 06:00) (87% - 100%)        I&O's Summary    13 Sep 2017 07:01  -  14 Sep 2017 06:52  --------------------------------------------------------  IN: 1100 mL / OUT: 0 mL / NET: 1100 mL        Physical Exam:     Gen: laying in bed comfortably, NAD  Neuro: A&Ox3, sensation intact, R peripheral vision deficit  HEENT: JAE, EOMI BL, moist mucous membranes  CV: +S1S2, no m/r/g  Pulm: fine crackles auscultated over lower lung fields  GI: soft, NTND, +BSx4  Ext: no C/C/E, 2+ peripheral pulses, 5/5 muscle strength  Skin: warm, normal color, no rashes or lesions    Meds:  enoxaparin Injectable 40 milliGRAM(s) SubCutaneous daily    piperacillin/tazobactam IVPB. 3.375 Gram(s) IV Intermittent every 8 hours  vancomycin  IVPB 1000 milliGRAM(s) IV Intermittent every 12 hours    spironolactone 25 milliGRAM(s) Oral daily  tadalafil 40 milliGRAM(s) Oral daily    simvastatin 20 milliGRAM(s) Oral at bedtime  methylPREDNISolone sodium succinate Injectable 40 milliGRAM(s) IV Push every 12 hours    buDESOnide   0.5 milliGRAM(s) Respule 0.5 milliGRAM(s) Inhalation two times a day  ALBUTerol    0.083% 2.5 milliGRAM(s) Nebulizer every 6 hours  guaiFENesin  milliGRAM(s) Oral every 12 hours    aspirin 325 milliGRAM(s) Oral daily    pantoprazole    Tablet 40 milliGRAM(s) Oral before breakfast          influenza   Vaccine 0.5 milliLiter(s) IntraMuscular once  mycophenolate mofetil 1000 milliGRAM(s) Oral two times a day      tyvaso inhalation 7 Puff(s) 7 Puff(s) Inhalation four times a day                              10.8   8.3   )-----------( 220      ( 14 Sep 2017 06:37 )             34.7       09-14    139  |  98  |  19  ----------------------------<  194<H>  4.4   |  35<H>  |  0.54        Culture - Sputum . (09.13.17 @ 16:52)    Gram Stain:   No polymorphonuclear leukocytes per low power field  No Squamous epithelial cells per low power field  No organisms seen per oil power field      Culture - Urine (09.12.17 @ 21:43)    Specimen Source: .Urine Clean Catch (Midstream)    Culture Results:   50,000 - 99,000 CFU/mL Enterococcus faecalis      Culture - Blood (09.12.17 @ 18:20)    Specimen Source: .Blood Blood-Peripheral    Culture Results:   No growth to date.      CENTRAL LINE: N    MARI: N    A-LINE: N    GLOBAL ISSUE/BEST PRACTICE:  Analgesia: no  Sedation: no  HOB elevation: yes  Stress ulcer prophylaxis: yes  VTE prophylaxis: yes  Glycemic control: no  Nutrition: dysphagia diet    CODE STATUS: DNR

## 2017-09-14 NOTE — PHYSICAL THERAPY INITIAL EVALUATION ADULT - PHYSICAL ASSIST/NONPHYSICAL ASSIST: SUPINE/SIT, REHAB EVAL
1 person assist/pt needs increased time to complete task and is very segmental. Pt /c GAUTHIER/set-up required/nonverbal cues (demo/gestures)

## 2017-09-14 NOTE — PHYSICAL THERAPY INITIAL EVALUATION ADULT - PLANNED THERAPY INTERVENTIONS, PT EVAL
balance training/bed mobility training/transfer training/Modified [place] Score: 2 and at PT eval: 4/gait training

## 2017-09-14 NOTE — PHYSICAL THERAPY INITIAL EVALUATION ADULT - IMPAIRMENTS CONTRIBUTING TO GAIT DEVIATIONS, PT EVAL
decreased strength/decreased flexibility/narrow base of support/impaired balance/impaired postural control

## 2017-09-14 NOTE — PHYSICAL THERAPY INITIAL EVALUATION ADULT - GAIT DEVIATIONS NOTED, PT EVAL
decreased stride length/decreased velocity of limb motion/decreased step length/decreased bailey/decreased weight-shifting ability

## 2017-09-14 NOTE — PHYSICAL THERAPY INITIAL EVALUATION ADULT - GENERAL OBSERVATIONS, REHAB EVAL
Patient received in ICU bed on 8 L of O2 via nasal canula. Pt agreeable /c PT eval. Pt preferred to have PT and pt wear a mask just for precautionary purposes. Pt is know to this PT from previous admissions.

## 2017-09-14 NOTE — PHYSICAL THERAPY INITIAL EVALUATION ADULT - PERTINENT HX OF CURRENT PROBLEM, REHAB EVAL
As per H&P:"Pt complains of worsening sob since last night requiring more o2 then usual, felt better on 8 liters, in er with bipap, also states had brief episode of ams. pt with significant sob going to icu for further monitoring. no prior hx of cva, has significant hx of pulm htn, all her doctors including pulm at [facility]"

## 2017-09-14 NOTE — PHYSICAL THERAPY INITIAL EVALUATION ADULT - RANGE OF MOTION EXAMINATION, REHAB EVAL
Kyphotic back/bilateral upper extremity ROM was WFL (within functional limits)/bilateral lower extremity ROM was WFL (within functional limits)/deficits as listed below

## 2017-09-14 NOTE — PHYSICAL THERAPY INITIAL EVALUATION ADULT - IMPAIRMENTS FOUND, PT EVAL
muscle strength/aerobic capacity/endurance/gait, locomotion, and balance/joint integrity and mobility/posture

## 2017-09-14 NOTE — PHYSICAL THERAPY INITIAL EVALUATION ADULT - ADDITIONAL COMMENTS
Pt lives in a house with her spouse, dtr and dtr's family, no steps. Pt ambulates minimal distances with Rollator due to intersitial lung disease and pulmonary HTN. Pt gets very SOB /c minimal exertion. Pt spouse able to assist with ADLs as needed. Pt has  chair for longer distances and uses home O2 24/7. Pt has an aide for 5 hours/day for 6 days/wk. Pt has a wheelchair, rollator, home O2, shower bench and grab bars.

## 2017-09-14 NOTE — PHYSICAL THERAPY INITIAL EVALUATION ADULT - TRANSFER SAFETY CONCERNS NOTED: SIT/STAND, REHAB EVAL
SOB/GAUTHIER; pt needs increased time to complete task and is very segmental./decreased weight-shifting ability/decreased sequencing ability/decreased balance during turns

## 2017-09-15 LAB
ANION GAP SERPL CALC-SCNC: 4 MMOL/L — LOW (ref 5–17)
BUN SERPL-MCNC: 24 MG/DL — HIGH (ref 7–23)
CALCIUM SERPL-MCNC: 8.4 MG/DL — LOW (ref 8.5–10.1)
CHLORIDE SERPL-SCNC: 99 MMOL/L — SIGNIFICANT CHANGE UP (ref 96–108)
CO2 SERPL-SCNC: 37 MMOL/L — HIGH (ref 22–31)
CREAT SERPL-MCNC: 0.62 MG/DL — SIGNIFICANT CHANGE UP (ref 0.5–1.3)
CULTURE RESULTS: SIGNIFICANT CHANGE UP
GLUCOSE SERPL-MCNC: 166 MG/DL — HIGH (ref 70–99)
HCT VFR BLD CALC: 36.3 % — SIGNIFICANT CHANGE UP (ref 34.5–45)
HGB BLD-MCNC: 11.3 G/DL — LOW (ref 11.5–15.5)
MAGNESIUM SERPL-MCNC: 2.3 MG/DL — SIGNIFICANT CHANGE UP (ref 1.6–2.6)
MCHC RBC-ENTMCNC: 30.7 PG — SIGNIFICANT CHANGE UP (ref 27–34)
MCHC RBC-ENTMCNC: 31 GM/DL — LOW (ref 32–36)
MCV RBC AUTO: 99 FL — SIGNIFICANT CHANGE UP (ref 80–100)
PHOSPHATE SERPL-MCNC: 4.1 MG/DL — SIGNIFICANT CHANGE UP (ref 2.5–4.5)
PLATELET # BLD AUTO: 214 K/UL — SIGNIFICANT CHANGE UP (ref 150–400)
POTASSIUM SERPL-MCNC: 4.7 MMOL/L — SIGNIFICANT CHANGE UP (ref 3.5–5.3)
POTASSIUM SERPL-SCNC: 4.7 MMOL/L — SIGNIFICANT CHANGE UP (ref 3.5–5.3)
RBC # BLD: 3.67 M/UL — LOW (ref 3.8–5.2)
RBC # FLD: 13.8 % — SIGNIFICANT CHANGE UP (ref 10.3–14.5)
SODIUM SERPL-SCNC: 140 MMOL/L — SIGNIFICANT CHANGE UP (ref 135–145)
SPECIMEN SOURCE: SIGNIFICANT CHANGE UP
WBC # BLD: 7.3 K/UL — SIGNIFICANT CHANGE UP (ref 3.8–10.5)
WBC # FLD AUTO: 7.3 K/UL — SIGNIFICANT CHANGE UP (ref 3.8–10.5)

## 2017-09-15 PROCEDURE — 93880 EXTRACRANIAL BILAT STUDY: CPT | Mod: 26

## 2017-09-15 PROCEDURE — 70551 MRI BRAIN STEM W/O DYE: CPT | Mod: 26

## 2017-09-15 PROCEDURE — 70544 MR ANGIOGRAPHY HEAD W/O DYE: CPT | Mod: 26,59

## 2017-09-15 PROCEDURE — 99233 SBSQ HOSP IP/OBS HIGH 50: CPT | Mod: GC

## 2017-09-15 RX ORDER — ALPRAZOLAM 0.25 MG
0.25 TABLET ORAL ONCE
Qty: 0 | Refills: 0 | Status: DISCONTINUED | OUTPATIENT
Start: 2017-09-15 | End: 2017-09-15

## 2017-09-15 RX ORDER — AZITHROMYCIN 500 MG/1
250 TABLET, FILM COATED ORAL
Qty: 0 | Refills: 0 | Status: DISCONTINUED | OUTPATIENT
Start: 2017-09-15 | End: 2017-09-22

## 2017-09-15 RX ORDER — MYCOPHENOLATE MOFETIL 250 MG/1
1000 CAPSULE ORAL
Qty: 0 | Refills: 0 | Status: DISCONTINUED | OUTPATIENT
Start: 2017-09-15 | End: 2017-09-22

## 2017-09-15 RX ORDER — TADALAFIL 10 MG/1
40 TABLET, FILM COATED ORAL
Qty: 0 | Refills: 0 | Status: DISCONTINUED | OUTPATIENT
Start: 2017-09-15 | End: 2017-09-22

## 2017-09-15 RX ADMIN — Medication 600 MILLIGRAM(S): at 05:43

## 2017-09-15 RX ADMIN — Medication 600 MILLIGRAM(S): at 17:37

## 2017-09-15 RX ADMIN — TADALAFIL 40 MILLIGRAM(S): 10 TABLET, FILM COATED ORAL at 06:23

## 2017-09-15 RX ADMIN — PIPERACILLIN AND TAZOBACTAM 25 GRAM(S): 4; .5 INJECTION, POWDER, LYOPHILIZED, FOR SOLUTION INTRAVENOUS at 22:59

## 2017-09-15 RX ADMIN — ENOXAPARIN SODIUM 40 MILLIGRAM(S): 100 INJECTION SUBCUTANEOUS at 12:15

## 2017-09-15 RX ADMIN — Medication 40 MILLIGRAM(S): at 17:36

## 2017-09-15 RX ADMIN — MYCOPHENOLATE MOFETIL 1000 MILLIGRAM(S): 250 CAPSULE ORAL at 05:43

## 2017-09-15 RX ADMIN — Medication 0.5 MILLIGRAM(S): at 19:35

## 2017-09-15 RX ADMIN — SIMVASTATIN 20 MILLIGRAM(S): 20 TABLET, FILM COATED ORAL at 19:40

## 2017-09-15 RX ADMIN — OMEPRAZOLE 40 MILLIGRAM(S): 10 CAPSULE, DELAYED RELEASE ORAL at 06:13

## 2017-09-15 RX ADMIN — Medication 325 MILLIGRAM(S): at 12:15

## 2017-09-15 RX ADMIN — AZITHROMYCIN 250 MILLIGRAM(S): 500 TABLET, FILM COATED ORAL at 17:03

## 2017-09-15 RX ADMIN — PIPERACILLIN AND TAZOBACTAM 25 GRAM(S): 4; .5 INJECTION, POWDER, LYOPHILIZED, FOR SOLUTION INTRAVENOUS at 13:52

## 2017-09-15 RX ADMIN — SPIRONOLACTONE 25 MILLIGRAM(S): 25 TABLET, FILM COATED ORAL at 05:42

## 2017-09-15 RX ADMIN — ALBUTEROL 2.5 MILLIGRAM(S): 90 AEROSOL, METERED ORAL at 07:23

## 2017-09-15 RX ADMIN — Medication 0.5 MILLIGRAM(S): at 07:23

## 2017-09-15 RX ADMIN — PIPERACILLIN AND TAZOBACTAM 25 GRAM(S): 4; .5 INJECTION, POWDER, LYOPHILIZED, FOR SOLUTION INTRAVENOUS at 05:42

## 2017-09-15 RX ADMIN — ALBUTEROL 2.5 MILLIGRAM(S): 90 AEROSOL, METERED ORAL at 02:14

## 2017-09-15 RX ADMIN — ALBUTEROL 2.5 MILLIGRAM(S): 90 AEROSOL, METERED ORAL at 19:35

## 2017-09-15 RX ADMIN — ALBUTEROL 2.5 MILLIGRAM(S): 90 AEROSOL, METERED ORAL at 14:10

## 2017-09-15 RX ADMIN — Medication 40 MILLIGRAM(S): at 05:42

## 2017-09-15 RX ADMIN — MYCOPHENOLATE MOFETIL 1000 MILLIGRAM(S): 250 CAPSULE ORAL at 17:36

## 2017-09-15 RX ADMIN — Medication 0.25 MILLIGRAM(S): at 17:03

## 2017-09-15 NOTE — PROGRESS NOTE ADULT - SUBJECTIVE AND OBJECTIVE BOX
Date/Time Patient Seen:  		  Referring MD:   Data Reviewed	       Patient is a 72y old  Female who presents with a chief complaint of SOB x4 days, headache, forgetfulness (12 Sep 2017 19:33)  in bed  seen and examined  vs and meds reviewed  on BIPAP overnight      Subjective/HPI     PAST MEDICAL & SURGICAL HISTORY:  Chronic interstitial lung disease  Pulmonary hypertension  DM (diabetes mellitus): pulmonary htn  History of cholecystectomy  H/O abdominal hysterectomy: 2002        Medication list         MEDICATIONS  (STANDING):  piperacillin/tazobactam IVPB. 3.375 Gram(s) IV Intermittent every 8 hours  buDESOnide   0.5 milliGRAM(s) Respule 0.5 milliGRAM(s) Inhalation two times a day  ALBUTerol    0.083% 2.5 milliGRAM(s) Nebulizer every 6 hours  enoxaparin Injectable 40 milliGRAM(s) SubCutaneous daily  influenza   Vaccine 0.5 milliLiter(s) IntraMuscular once  aspirin 325 milliGRAM(s) Oral daily  spironolactone 25 milliGRAM(s) Oral daily  mycophenolate mofetil 1000 milliGRAM(s) Oral two times a day  tadalafil 40 milliGRAM(s) Oral daily  tyvaso inhalation 7 Puff(s) 7 Puff(s) Inhalation four times a day  simvastatin 20 milliGRAM(s) Oral at bedtime  guaiFENesin  milliGRAM(s) Oral every 12 hours  methylPREDNISolone sodium succinate Injectable 40 milliGRAM(s) IV Push every 12 hours  trimethoprim  160 mG/sulfamethoxazole 800 mG 1 Tablet(s) Oral every other day  omeprazole 40 milliGRAM(s) Oral daily    MEDICATIONS  (PRN):         Vitals log        ICU Vital Signs Last 24 Hrs  T(C): 36.5 (15 Sep 2017 00:01), Max: 37 (14 Sep 2017 11:17)  T(F): 97.7 (15 Sep 2017 00:01), Max: 98.6 (14 Sep 2017 11:17)  HR: 87 (15 Sep 2017 05:00) (81 - 111)  BP: 95/64 (15 Sep 2017 05:00) (78/53 - 116/59)  BP(mean): 75 (15 Sep 2017 05:00) (62 - 90)  ABP: --  ABP(mean): --  RR: 32 (15 Sep 2017 05:00) (22 - 51)  SpO2: 100% (15 Sep 2017 05:00) (91% - 100%)           Input and Output:  I&O's Detail    13 Sep 2017 07:01  -  14 Sep 2017 07:00  --------------------------------------------------------  IN:    IV PiggyBack: 500 mL    lactated ringers.: 150 mL    Oral Fluid: 150 mL    Solution: 300 mL  Total IN: 1100 mL    OUT:  Total OUT: 0 mL    Total NET: 1100 mL      14 Sep 2017 07:01  -  15 Sep 2017 06:09  --------------------------------------------------------  IN:    Oral Fluid: 540 mL  Total IN: 540 mL    OUT:    Voided: 300 mL  Total OUT: 300 mL    Total NET: 240 mL          Lab Data                        10.8   8.3   )-----------( 220      ( 14 Sep 2017 06:37 )             34.7     09-14    139  |  98  |  19  ----------------------------<  194<H>  4.4   |  35<H>  |  0.54    Ca    8.6      14 Sep 2017 06:37  Phos  3.6     09-14  Mg     2.1     09-14              Review of Systems	      Objective     Physical Examination  head at  heart - s1s2  lungs - dec BS  abd - soft  frail  kyphosis  anxious        Pertinent Lab findings & Imaging      Francie:  NO   Adequate UO     I&O's Detail    13 Sep 2017 07:01  -  14 Sep 2017 07:00  --------------------------------------------------------  IN:    IV PiggyBack: 500 mL    lactated ringers.: 150 mL    Oral Fluid: 150 mL    Solution: 300 mL  Total IN: 1100 mL    OUT:  Total OUT: 0 mL    Total NET: 1100 mL      14 Sep 2017 07:01  -  15 Sep 2017 06:09  --------------------------------------------------------  IN:    Oral Fluid: 540 mL  Total IN: 540 mL    OUT:    Voided: 300 mL  Total OUT: 300 mL    Total NET: 240 mL               Discussed with:     Cultures:	        Radiology

## 2017-09-15 NOTE — PROGRESS NOTE ADULT - SUBJECTIVE AND OBJECTIVE BOX
Patient is a 72y old  Female who presents with a chief complaint of SOB x4 days, headache, forgetfulness (12 Sep 2017 19:33)      INTERVAL HPI/OVERNIGHT EVENTS: stable, looks much better today, maybe repeat mri today    MEDICATIONS  (STANDING):  piperacillin/tazobactam IVPB. 3.375 Gram(s) IV Intermittent every 8 hours  buDESOnide   0.5 milliGRAM(s) Respule 0.5 milliGRAM(s) Inhalation two times a day  ALBUTerol    0.083% 2.5 milliGRAM(s) Nebulizer every 6 hours  enoxaparin Injectable 40 milliGRAM(s) SubCutaneous daily  influenza   Vaccine 0.5 milliLiter(s) IntraMuscular once  aspirin 325 milliGRAM(s) Oral daily  spironolactone 25 milliGRAM(s) Oral daily  mycophenolate mofetil 1000 milliGRAM(s) Oral two times a day  tadalafil 40 milliGRAM(s) Oral daily  tyvaso inhalation 7 Puff(s) 7 Puff(s) Inhalation four times a day  simvastatin 20 milliGRAM(s) Oral at bedtime  guaiFENesin  milliGRAM(s) Oral every 12 hours  methylPREDNISolone sodium succinate Injectable 40 milliGRAM(s) IV Push every 12 hours  trimethoprim  160 mG/sulfamethoxazole 800 mG 1 Tablet(s) Oral every other day  omeprazole 40 milliGRAM(s) Oral daily    MEDICATIONS  (PRN):      Allergies    No Known Allergies    Intolerances        REVIEW OF SYSTEMS:  CONSTITUTIONAL: No fever, weight loss, or fatigue  EYES: No eye pain, visual disturbances  ENMT:  No difficulty hearing, tinnitus, vertigo; No sinus or throat pain  NECK: No pain or stiffness  RESPIRATORY: less sob  CARDIOVASCULAR: No chest pain, palpitations, dizziness  GASTROINTESTINAL: No abdominal or epigastric pain. No nausea, vomiting, or hematemesis; No diarrhea or constipation. No melena or hematochezia.  GENITOURINARY: No dysuria, frequency, hematuria, or incontinence  NEUROLOGICAL: No headaches, memory loss, loss of strength, numbness, or tremors  SKIN: No itching, burning  LYMPH NODES: No enlarged glands  MUSCULOSKELETAL: No joint pain or swelling; No muscle, back, or extremity pain  PSYCHIATRIC: No depression, mood swings  HEME/LYMPH: No easy bruising, or bleeding gums  ALLERGY AND IMMUNOLOGIC: No hives    Vital Signs Last 24 Hrs  T(C): 36.5 (15 Sep 2017 07:52), Max: 37 (14 Sep 2017 11:17)  T(F): 97.7 (15 Sep 2017 07:52), Max: 98.6 (14 Sep 2017 11:17)  HR: 84 (15 Sep 2017 07:49) (81 - 104)  BP: 114/57 (15 Sep 2017 07:00) (78/53 - 116/59)  BP(mean): 78 (15 Sep 2017 07:00) (62 - 90)  RR: 24 (15 Sep 2017 07:49) (22 - 39)  SpO2: 96% (15 Sep 2017 07:49) (91% - 100%)    PHYSICAL EXAM:  GENERAL: NAD, well-groomed, well-developed  HEAD:  Atraumatic, Normocephalic  EYES: EOMI, PERRLA, conjunctiva and sclera clear  ENMT: No tonsillar erythema, exudates, or enlargement   NECK: Supple, No JVD  NERVOUS SYSTEM:  Alert & Oriented X3, Good concentration  CHEST/LUNG: clear, no wheezing  HEART: Regular rate and rhythm  ABDOMEN: Soft, Nontender, Nondistended; Bowel sounds present  EXTREMITIES:  2+ Peripheral Pulses   LYMPH: No lymphadenopathy noted  SKIN: No rashes     LABS:                        11.3   7.3   )-----------( 214      ( 15 Sep 2017 06:38 )             36.3     15 Sep 2017 06:38    140    |  99     |  24     ----------------------------<  166    4.7     |  37     |  0.62     Ca    8.4        15 Sep 2017 06:38  Phos  4.1       15 Sep 2017 06:38  Mg     2.3       15 Sep 2017 06:38          CAPILLARY BLOOD GLUCOSE  140 (15 Sep 2017 07:00)                RADIOLOGY & ADDITIONAL TESTS:  echo noted      Consultant(s) Notes Reviewed:  [x ] YES  [ ] NO    Care Discussed with Consultants/Other Providers [x ] YES  [ ] NO    Advanced Care Planning Discussed with Patien/Family [ ] YES  [x ] NO

## 2017-09-15 NOTE — PROGRESS NOTE ADULT - SUBJECTIVE AND OBJECTIVE BOX
Progress Note:   · Provider Specialty	Neurology	      · Subjective and Objective: 	  Neurology Follow up note    DAVID [id number]    HPI:  Pt complains of worsening sob since last night requiring more o2 then usual, felt better on 8 liters, in er with bipap, also states had brief episode of ams. pt with significant sob going to icu for further monitoring. no prior hx of cva, has significant hx of pulm htn, all her doctors including pulm at [facility] (12 Sep 2017 17:35)      Interval History - no ha/dizziness.    Patient is seen, chart was reviewed and case was discussed with the treatment team.  Pt is not in any distress.   Lying on bed comfortably.   No events reported overnight.   No clinical seizure was reported.  Sitting on chair bed comfortably.    is at bedside.    Vital Signs Last 24 Hrs  T(C): 37 (14 Sep 2017 11:17), Max: 37 (14 Sep 2017 11:17)  T(F): 98.6 (14 Sep 2017 11:17), Max: 98.6 (14 Sep 2017 11:17)  HR: 98 (14 Sep 2017 11:00) (90 - 111)  BP: 100/62 (14 Sep 2017 11:00) (81/53 - 116/58)  BP(mean): 76 (14 Sep 2017 11:00) (60 - 83)  RR: 33 (14 Sep 2017 11:00) (12 - 51)  SpO2: 100% (14 Sep 2017 11:00) (87% - 100%)        REVIEW OF SYSTEMS:    Constitutional: No fever, weight loss or fatigue  Eyes: No eye pain, visual disturbances, or discharge  ENT:  No difficulty hearing, tinnitus, vertigo; No sinus or throat pain  Neck: No pain or stiffness  Cardiovascular: No chest pain, palpitations, shortness of breath, dizziness or leg swelling  Gastrointestinal: No abdominal or epigastric pain. No nausea, vomiting or hematemesis; No diarrhea or constipation. No melena or hematochezia.  Genitourinary: No dysuria, frequency, hematuria or incontinence  Neurological: No headaches, memory loss, loss of strength, numbness or tremors  Psychiatric: No depression, anxiety, mood swings or difficulty sleeping  Musculoskeletal: No joint pain or swelling; No muscle, back or extremity pain  Skin: No itching, burning, rashes or lesions   Lymph Nodes: No enlarged glands  Endocrine: No heat or cold intolerance; No hair loss, No h/o diabetes or thyroid dysfunction  Allergy and Immunologic: No hives or eczema    On Neurological Examination:    Mental Status - Pt is alert, awake, oriented X3. Follows commands well and able to answer questions appropriatelyMood and affect  normal    Speech -  Normal.     Cranial Nerves - Pupils 3 mm equal and reactive to light, extraocular eye movements intact. Pt has right visual field deficit.  Pt has no  facial asymmetry. Facial sensation is intact.Tongue - is in midline.        Motor Exam - 5/5 of UE.  LE 4/5.    Sensory Exam - . Pt withdraws all extremities equally on stimulation. No asymmetry seen. No complaints of tingling, numbness.    coordination:    Finger to nose: normal.    Deep tendon Reflexes - 2 plus all over.        Romberg - Negative.    Neck Supple -  Yes.     MEDICATIONS    piperacillin/tazobactam IVPB. 3.375 Gram(s) IV Intermittent every 8 hours  buDESOnide   0.5 milliGRAM(s) Respule 0.5 milliGRAM(s) Inhalation two times a day  ALBUTerol    0.083% 2.5 milliGRAM(s) Nebulizer every 6 hours  enoxaparin Injectable 40 milliGRAM(s) SubCutaneous daily  influenza   Vaccine 0.5 milliLiter(s) IntraMuscular once  aspirin 325 milliGRAM(s) Oral daily  spironolactone 25 milliGRAM(s) Oral daily  mycophenolate mofetil 1000 milliGRAM(s) Oral two times a day  tadalafil 40 milliGRAM(s) Oral daily  tyvaso inhalation 7 Puff(s) 7 Puff(s) Inhalation four times a day  simvastatin 20 milliGRAM(s) Oral at bedtime  guaiFENesin  milliGRAM(s) Oral every 12 hours  methylPREDNISolone sodium succinate Injectable 40 milliGRAM(s) IV Push every 12 hours  trimethoprim  160 mG/sulfamethoxazole 800 mG 1 Tablet(s) Oral every other day      Allergies    No Known Allergies    Intolerances        LABS:  CBC Full  -  ( 14 Sep 2017 06:37 )  WBC Count : 8.3 K/uL  Hemoglobin : 10.8 g/dL  Hematocrit : 34.7 %  Platelet Count - Automated : 220 K/uL  Mean Cell Volume : 97.3 fl  Mean Cell Hemoglobin : 30.4 pg  Mean Cell Hemoglobin Concentration : 31.2 gm/dL  Auto Neutrophil # : x  Auto Lymphocyte # : x  Auto Monocyte # : x  Auto Eosinophil # : x  Auto Basophil # : x  Auto Neutrophil % : x  Auto Lymphocyte % : x  Auto Monocyte % : x  Auto Eosinophil % : x  Auto Basophil % : x    Urinalysis Basic - ( 12 Sep 2017 16:45 )    Color: Yellow / Appearance: Clear / S.010 / pH: x  Gluc: x / Ketone: Moderate  / Bili: Negative / Urobili: Negative   Blood: x / Protein: Negative / Nitrite: Negative   Leuk Esterase: Trace / RBC: 0-2 /HPF / WBC 3-5   Sq Epi: x / Non Sq Epi: x / Bacteria: x          139  |  98  |  19  ----------------------------<  194<H>  4.4   |  35<H>  |  0.54    Ca    8.6      14 Sep 2017 06:37  Phos  3.6       Mg     2.1         TPro  7.0  /  Alb  3.6  /  TBili  0.8  /  DBili  x   /  AST  19  /  ALT  23  /  AlkPhos  59      Hemoglobin A1C:     Vitamin B12     RADIOLOGY.    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  SP RESPIRATORY FAILURE.    MRI OF HEAD WITHOUT CONTRAST IS PENDING.  CONTINUE ASA AND STATIN.  Physical therapy evaluation.  OOB to chair/ambulation with assistance only.  Plan of care was discussed with family. Questions answered.  Would continue to follow.

## 2017-09-15 NOTE — PROGRESS NOTE ADULT - ASSESSMENT
73yo F w/ PMHx of chronic interstitial lung disease, pHTN, and DM presenting with respiratory distress, acute/subacute L PCA infarct on CT, and one episode of AMS. Respiratory status has significantly improved.    Neuro:  -    CV:  - pHTN: c/w Adcirca, Tyvaso, and Aldactone regimen.    Resp:  -    GI:  - Dysphagia diet: c/w omeprazole 40 mg daily.    :  - Stable.    ID:  -    Ext:  - Continue with VTE ppx. 71yo F w/ PMHx of chronic interstitial lung disease, pHTN, and DM presenting with respiratory distress, acute/subacute L PCA infarct on CT, and one episode of AMS. Respiratory status has significantly improved.    Neuro:  - CT head shows acute/subacute L PCA infarct.  - C/w ASA, statin, and PT.  - BL carotid doppler shows no hemodynamically significant stenosis.  - TTE shows dilated R ventricle and atrium with severe tricuspid regurgitation.  - Prep patient for transfer to tele.    CV:  - pHTN: c/w Adcirca, Tyvaso, and Aldactone regimen.    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  - NC during the day and BiPAP while sleeping.  - C/w home regimen of cellcept, solumedrol, albuterol, and budesonide.  - C/w home regimen of azithromycin and bactrim.    GI:  - Dysphagia diet: c/w omeprazole 40 mg daily.    Endo:  - PMHx of DM, PCP told pt she can stop taking her medications.  - In ICU, blood glucose ranges 166-200, HbA1C 7.0, most likely 2/2 high dose steroids.  - Will continue to monitor FS glucose.    :  - Stable.    ID:  - Blood and sputum cx's negative; urine cx grows <100K E. faecalis and pt expresses no UTI symptoms.  - C/w Zosyn (day 4) for one more day, stop 9/16/17.    Ext:  - Continue with VTE ppx.

## 2017-09-15 NOTE — PROGRESS NOTE ADULT - SUBJECTIVE AND OBJECTIVE BOX
Interval events:     Review of Systems:  Constitutional: no fever, chills, fatigue  Neuro: no headache, numbness, weakness  Resp: no cough, wheezing, shortness of breath  CVS: no chest pain, palpitations, leg swelling  GI: no abdominal pain, nausea, vomiting, diarrhea   : no dysuria, frequency, incontinence  Skin: no itching, burning, rashes, or lesions   Msk: no joint pain or swelling  Psych: no depression, anxiety    T(F): 97.7 (09-15-17 @ 00:01), Max: 98.6 (09-14-17 @ 11:17)  HR: 84 (09-15-17 @ 07:29) (81 - 111)  BP: 114/57 (09-15-17 @ 07:00) (78/53 - 116/59)  RR: 30 (09-15-17 @ 07:00) (22 - 51)  SpO2: 98% (09-15-17 @ 07:29) (91% - 100%)  Wt(kg): --        CAPILLARY BLOOD GLUCOSE        I&O's Summary    14 Sep 2017 07:01  -  15 Sep 2017 07:00  --------------------------------------------------------  IN: 780 mL / OUT: 300 mL / NET: 480 mL        Physical Exam:     Gen:  Neuro:  HEENT:  CV:  Pulm:  GI:  Ext:  Skin:    Meds:  enoxaparin Injectable 40 milliGRAM(s) SubCutaneous daily    piperacillin/tazobactam IVPB. 3.375 Gram(s) IV Intermittent every 8 hours  trimethoprim  160 mG/sulfamethoxazole 800 mG 1 Tablet(s) Oral every other day    spironolactone 25 milliGRAM(s) Oral daily  tadalafil 40 milliGRAM(s) Oral daily    simvastatin 20 milliGRAM(s) Oral at bedtime  methylPREDNISolone sodium succinate Injectable 40 milliGRAM(s) IV Push every 12 hours    buDESOnide   0.5 milliGRAM(s) Respule 0.5 milliGRAM(s) Inhalation two times a day  ALBUTerol    0.083% 2.5 milliGRAM(s) Nebulizer every 6 hours  guaiFENesin  milliGRAM(s) Oral every 12 hours    aspirin 325 milliGRAM(s) Oral daily    omeprazole 40 milliGRAM(s) Oral daily      influenza   Vaccine 0.5 milliLiter(s) IntraMuscular once  mycophenolate mofetil 1000 milliGRAM(s) Oral two times a day      tyvaso inhalation 7 Puff(s) 7 Puff(s) Inhalation four times a day                                11.3   7.3   )-----------( 214      ( 15 Sep 2017 06:38 )             36.3       09-15    140  |  99  |  24<H>  ----------------------------<  166<H>  4.7   |  37<H>  |  0.62    Ca    8.4<L>      15 Sep 2017 06:38  Phos  4.1     09-15  Mg     2.3     09-15          Radiology: ***    Bedside Lung U/S: ***    Bedside Cardiac U/S: ***    CENTRAL LINE: Y/N   DATE INSERTED:   REMOVE: Y/N    MARI: Y/N      DATE INSERTED:        REMOVE: Y/N    A-LINE: Y/N     DATE INSERTED:              REMOVE: Y/N    GLOBAL ISSUE/BEST PRACTICE:  Analgesia: no  Sedation: no  HOB elevation: yes  Stress ulcer prophylaxis: yes  VTE prophylaxis: yes  Glycemic control: no  Nutrition: dysphagia diet    CODE STATUS: DNR/DNI Interval events:     Review of Systems:  Constitutional: no fever, chills, fatigue  Neuro: no headache, numbness, weakness  Resp: no cough, wheezing, shortness of breath  CVS: no chest pain, palpitations, leg swelling  GI: no abdominal pain, nausea, vomiting, diarrhea   : no dysuria, frequency, incontinence  Skin: no itching, burning, rashes, or lesions   Msk: no joint pain or swelling  Psych: no depression, anxiety    T(F): 97.7 (09-15-17 @ 00:01), Max: 98.6 (09-14-17 @ 11:17)  HR: 84 (09-15-17 @ 07:29) (81 - 111)  BP: 114/57 (09-15-17 @ 07:00) (78/53 - 116/59)  RR: 30 (09-15-17 @ 07:00) (22 - 51)  SpO2: 98% (09-15-17 @ 07:29) (91% - 100%)        I&O's Summary    14 Sep 2017 07:01  -  15 Sep 2017 07:00  --------------------------------------------------------  IN: 780 mL / OUT: 300 mL / NET: 480 mL        Physical Exam:     Gen:  Neuro:  HEENT:  CV:  Pulm:  GI:  Ext:  Skin:    Meds:  enoxaparin Injectable 40 milliGRAM(s) SubCutaneous daily    piperacillin/tazobactam IVPB. 3.375 Gram(s) IV Intermittent every 8 hours  trimethoprim  160 mG/sulfamethoxazole 800 mG 1 Tablet(s) Oral every other day    spironolactone 25 milliGRAM(s) Oral daily  tadalafil 40 milliGRAM(s) Oral daily    simvastatin 20 milliGRAM(s) Oral at bedtime  methylPREDNISolone sodium succinate Injectable 40 milliGRAM(s) IV Push every 12 hours    buDESOnide   0.5 milliGRAM(s) Respule 0.5 milliGRAM(s) Inhalation two times a day  ALBUTerol    0.083% 2.5 milliGRAM(s) Nebulizer every 6 hours  guaiFENesin  milliGRAM(s) Oral every 12 hours    aspirin 325 milliGRAM(s) Oral daily    omeprazole 40 milliGRAM(s) Oral daily      influenza   Vaccine 0.5 milliLiter(s) IntraMuscular once  mycophenolate mofetil 1000 milliGRAM(s) Oral two times a day      tyvaso inhalation 7 Puff(s) 7 Puff(s) Inhalation four times a day                                11.3   7.3   )-----------( 214      ( 15 Sep 2017 06:38 )             36.3       09-15    140  |  99  |  24<H>  ----------------------------<  166<H>  4.7   |  37<H>  |  0.62    Ca    8.4<L>      15 Sep 2017 06:38  Phos  4.1     09-15  Mg     2.3     09-15          Radiology: ***    Bedside Lung U/S: ***    Bedside Cardiac U/S: ***    CENTRAL LINE: Y/N   DATE INSERTED:   REMOVE: Y/N    KAMALJIT: Y/N      DATE INSERTED:        REMOVE: Y/N    A-LINE: Y/N     DATE INSERTED:              REMOVE: Y/N    GLOBAL ISSUE/BEST PRACTICE:  Analgesia: no  Sedation: no  HOB elevation: yes  Stress ulcer prophylaxis: yes  VTE prophylaxis: yes  Glycemic control: no  Nutrition: dysphagia diet    CODE STATUS: DNR/DNI Interval events: No acute events overnight.    Review of Systems:  Constitutional: no fever, chills, fatigue  Neuro: no headache, numbness, weakness  Resp: no cough, wheezing, shortness of breath  CVS: no chest pain, palpitations, leg swelling  GI: no abdominal pain, nausea, vomiting, diarrhea   : no dysuria, frequency, incontinence  Skin: no itching, burning, rashes, or lesions   Msk: no joint pain or swelling      T(F): 97.7 (09-15-17 @ 00:01), Max: 98.6 (09-14-17 @ 11:17)  HR: 84 (09-15-17 @ 07:29) (81 - 111)  BP: 114/57 (09-15-17 @ 07:00) (78/53 - 116/59)  RR: 30 (09-15-17 @ 07:00) (22 - 51)  SpO2: 98% (09-15-17 @ 07:29) (91% - 100%)        I&O's Summary    14 Sep 2017 07:01  -  15 Sep 2017 07:00  --------------------------------------------------------  IN: 780 mL / OUT: 300 mL / NET: 480 mL        BiPAP 10/5 at 50%, while patient sleeps.        Physical Exam:     Gen: laying in bed comfortably, NAD  Neuro: A&Ox3, sensation intact, decreased R peripheral vision  HEENT: JAE, EOMI BL, moist mucous membranes  CV: +S1S2, no m/r/g  Pulm: fine crackles heard over lower lung fields  GI: soft, NTND, +BSx4  Ext: no C/C/E, 5/5 muscle strength  Skin: warm, normal color, no rashes or lesions    Meds:  enoxaparin Injectable 40 milliGRAM(s) SubCutaneous daily    piperacillin/tazobactam IVPB. 3.375 Gram(s) IV Intermittent every 8 hours  trimethoprim  160 mG/sulfamethoxazole 800 mG 1 Tablet(s) Oral every other day    spironolactone 25 milliGRAM(s) Oral daily  tadalafil 40 milliGRAM(s) Oral daily    simvastatin 20 milliGRAM(s) Oral at bedtime  methylPREDNISolone sodium succinate Injectable 40 milliGRAM(s) IV Push every 12 hours    buDESOnide   0.5 milliGRAM(s) Respule 0.5 milliGRAM(s) Inhalation two times a day  ALBUTerol    0.083% 2.5 milliGRAM(s) Nebulizer every 6 hours  guaiFENesin  milliGRAM(s) Oral every 12 hours    aspirin 325 milliGRAM(s) Oral daily    omeprazole 40 milliGRAM(s) Oral daily      influenza   Vaccine 0.5 milliLiter(s) IntraMuscular once  mycophenolate mofetil 1000 milliGRAM(s) Oral two times a day      tyvaso inhalation 7 Puff(s) 7 Puff(s) Inhalation four times a day                                11.3   7.3   )-----------( 214      ( 15 Sep 2017 06:38 )             36.3       09-15    140  |  99  |  24<H>  ----------------------------<  166<H>  4.7   |  37<H>  |  0.62    Ca    8.4<L>      15 Sep 2017 06:38  Phos  4.1     09-15  Mg     2.3     09-15        EXAM:  ECHO TTE W/O CON COMP W/DOPPLR      PROCEDURE DATE:  09/14/2017      INTERPRETATION:  Ordering Physician: NILA SAUCEDA [id number]    Indication: CVA, pulmonary hypertension    Technician: VALERIA    Study Quality: Good   A complete echocardiographic study was performed utilizing standard   protocol including spectral and color Doppler in all echocardiographic   windows.    Height: 167 cm  Weight: 63 kg  BSA: 1.7 M2  Blood Pressure: 99/57    MEASUREMENTS  IVS: 0.9 cm  PWT: 1.0cm  LA: 4.4cm  AO: 3.1cm  LVIDd: 4.2cm  LVIDs: 3.3cm  LVOT:    cm    LVEF: Greater than 50 %  RVSP: 90 mmHg  RA Pressure:      IVC:        FINDINGS  Left Ventricle: Normal left ventricular size and systolic function.  Right Ventricle: Dilated  Left Atrium: Dilated  Right Atrium: Dilated  Mitral Valve: Mitral annular calcification. Mild regurgitation.  Aortic Valve: Mild leaflet sclerosis without significant stenosis or   regurgitation.  Tricuspid Valve: Severe regurgitation  Pulmonic Valve: Not visualized  Diastolic Function: Normal  Pericardium/Pleura: Normal      CONCLUSIONS:  Normal left ventricular size and systolic function. Dilated left atrium.   Paradoxical septal motion. Dilated right ventricle. Dilated right atrium.   Mitral annular calcification with mild regurgitation. Severe tricuspid   regurgitation. The pulmonary hypertension. Estimated 90 mmHg          EXAM:  US DPLX CAROTIDS COMPL BI                          PROCEDURE DATE:  09/15/2017     INTERPRETATION:  Exam Date: 9/15/2017 10:19 AM    Ultrasound of the carotid arteries    CLINICAL INFORMATION:   CVA    TECHNIQUE:   Doppler ultrasonography of the carotid arteries was   performed.  The magnitude of stenosis was estimated based on established   tables of systolic peak velocity related to the magnitude of carotid   stenosis as determined by NASCET criteria.    FINDINGS:    No previousexaminations are available for review.      The right carotid system demonstrates an intact common carotid artery.   There is scattered plaque, most prominent at the carotid bulb without   significant luminal narrowing.  No significant velocity acceleration has   occured.  Systolic and diastolic ratios remain within normal limits.    Spectral broadening is physiologic.  On transverse images no high grade   stenosis is demonstrated.   The visualized segment of the internal   carotid artery above the bulb is intact.  The origin and initial segment   of the external carotid is intact.          The left carotid system demonstrates an intact common carotid artery.    There is scattered plaque, most prominent at the carotid bulb without   significant luminal narrowing.  No significant velocity acceleration has   occured.  Systolic and diastolic ratios remain within normal limits.    Spectral broadening is physiologic.  On transverse images no high grade   stenosis is demonstrated.   The visualized segment of the internal   carotid artery above the bulb is intact.  The origin and initial segment   of the external carotid is intact.           The right vertebral artery is patent and demonstrates antegrade flow.         The left vertebral artery is patent and demonstrates antegrade flow.           IMPRESSION:       1.  Right carotid system:  No hemodynamically significant stenosis is   found.          2.  Left carotid system:  No hemodynamically significant stenosis is   found.            CENTRAL LINE: N    MARI: N    A-LINE: N    GLOBAL ISSUE/BEST PRACTICE:  Analgesia: no  Sedation: no  HOB elevation: yes  Stress ulcer prophylaxis: yes  VTE prophylaxis: yes  Glycemic control: no  Nutrition: dysphagia diet    CODE STATUS: DNR/DNI Interval events: No acute events overnight.    Review of Systems:  Constitutional: no fever, chills, fatigue  Neuro: no headache, numbness, weakness  Resp: no cough, wheezing, shortness of breath  CVS: no chest pain, palpitations, leg swelling  GI: no abdominal pain, nausea, vomiting, diarrhea   : no dysuria, frequency, incontinence  Skin: no itching, burning, rashes, or lesions   Msk: no joint pain or swelling      T(F): 97.7 (09-15-17 @ 00:01), Max: 98.6 (09-14-17 @ 11:17)  HR: 84 (09-15-17 @ 07:29) (81 - 111)  BP: 114/57 (09-15-17 @ 07:00) (78/53 - 116/59)  RR: 30 (09-15-17 @ 07:00) (22 - 51)  SpO2: 98% (09-15-17 @ 07:29) (91% - 100%)        I&O's Summary    14 Sep 2017 07:01  -  15 Sep 2017 07:00  --------------------------------------------------------  IN: 780 mL / OUT: 300 mL / NET: 480 mL        BiPAP 10/5 at 50%, while patient sleeps.    Physical Exam:     Gen: laying in bed comfortably, NAD  Neuro: A&Ox3, sensation intact, decreased R peripheral vision  HEENT: JAE, EOMI BL, moist mucous membranes  CV: +S1S2, no m/r/g  Pulm: fine crackles heard over lower lung fields  GI: soft, NTND, +BSx4  Ext: no C/C/E, 5/5 muscle strength  Skin: warm, normal color, no rashes or lesions    Meds:  enoxaparin Injectable 40 milliGRAM(s) SubCutaneous daily    piperacillin/tazobactam IVPB. 3.375 Gram(s) IV Intermittent every 8 hours  trimethoprim  160 mG/sulfamethoxazole 800 mG 1 Tablet(s) Oral every other day    spironolactone 25 milliGRAM(s) Oral daily  tadalafil 40 milliGRAM(s) Oral daily    simvastatin 20 milliGRAM(s) Oral at bedtime  methylPREDNISolone sodium succinate Injectable 40 milliGRAM(s) IV Push every 12 hours    buDESOnide   0.5 milliGRAM(s) Respule 0.5 milliGRAM(s) Inhalation two times a day  ALBUTerol    0.083% 2.5 milliGRAM(s) Nebulizer every 6 hours  guaiFENesin  milliGRAM(s) Oral every 12 hours    aspirin 325 milliGRAM(s) Oral daily    omeprazole 40 milliGRAM(s) Oral daily      influenza   Vaccine 0.5 milliLiter(s) IntraMuscular once  mycophenolate mofetil 1000 milliGRAM(s) Oral two times a day      tyvaso inhalation 7 Puff(s) 7 Puff(s) Inhalation four times a day                                11.3   7.3   )-----------( 214      ( 15 Sep 2017 06:38 )             36.3       09-15    140  |  99  |  24<H>  ----------------------------<  166<H>  4.7   |  37<H>  |  0.62    Ca    8.4<L>      15 Sep 2017 06:38  Phos  4.1     09-15  Mg     2.3     09-15        EXAM:  ECHO TTE W/O CON COMP W/DOPPLR      PROCEDURE DATE:  09/14/2017      INTERPRETATION:  Ordering Physician: NILA SAUCEDA [id number]    Indication: CVA, pulmonary hypertension    Technician: VALERIA    Study Quality: Good   A complete echocardiographic study was performed utilizing standard   protocol including spectral and color Doppler in all echocardiographic   windows.    Height: 167 cm  Weight: 63 kg  BSA: 1.7 M2  Blood Pressure: 99/57    MEASUREMENTS  IVS: 0.9 cm  PWT: 1.0cm  LA: 4.4cm  AO: 3.1cm  LVIDd: 4.2cm  LVIDs: 3.3cm  LVOT:    cm    LVEF: Greater than 50 %  RVSP: 90 mmHg  RA Pressure:      IVC:        FINDINGS  Left Ventricle: Normal left ventricular size and systolic function.  Right Ventricle: Dilated  Left Atrium: Dilated  Right Atrium: Dilated  Mitral Valve: Mitral annular calcification. Mild regurgitation.  Aortic Valve: Mild leaflet sclerosis without significant stenosis or   regurgitation.  Tricuspid Valve: Severe regurgitation  Pulmonic Valve: Not visualized  Diastolic Function: Normal  Pericardium/Pleura: Normal      CONCLUSIONS:  Normal left ventricular size and systolic function. Dilated left atrium.   Paradoxical septal motion. Dilated right ventricle. Dilated right atrium.   Mitral annular calcification with mild regurgitation. Severe tricuspid   regurgitation. The pulmonary hypertension. Estimated 90 mmHg          EXAM:  US DPLX CAROTIDS COMPL BI                          PROCEDURE DATE:  09/15/2017     INTERPRETATION:  Exam Date: 9/15/2017 10:19 AM    Ultrasound of the carotid arteries    CLINICAL INFORMATION:   CVA    TECHNIQUE:   Doppler ultrasonography of the carotid arteries was   performed.  The magnitude of stenosis was estimated based on established   tables of systolic peak velocity related to the magnitude of carotid   stenosis as determined by NASCET criteria.    FINDINGS:    No previousexaminations are available for review.      The right carotid system demonstrates an intact common carotid artery.   There is scattered plaque, most prominent at the carotid bulb without   significant luminal narrowing.  No significant velocity acceleration has   occured.  Systolic and diastolic ratios remain within normal limits.    Spectral broadening is physiologic.  On transverse images no high grade   stenosis is demonstrated.   The visualized segment of the internal   carotid artery above the bulb is intact.  The origin and initial segment   of the external carotid is intact.          The left carotid system demonstrates an intact common carotid artery.    There is scattered plaque, most prominent at the carotid bulb without   significant luminal narrowing.  No significant velocity acceleration has   occured.  Systolic and diastolic ratios remain within normal limits.    Spectral broadening is physiologic.  On transverse images no high grade   stenosis is demonstrated.   The visualized segment of the internal   carotid artery above the bulb is intact.  The origin and initial segment   of the external carotid is intact.           The right vertebral artery is patent and demonstrates antegrade flow.         The left vertebral artery is patent and demonstrates antegrade flow.           IMPRESSION:       1.  Right carotid system:  No hemodynamically significant stenosis is   found.          2.  Left carotid system:  No hemodynamically significant stenosis is   found.            CENTRAL LINE: N    MARI: N    A-LINE: N    GLOBAL ISSUE/BEST PRACTICE:  Analgesia: no  Sedation: no  HOB elevation: yes  Stress ulcer prophylaxis: yes  VTE prophylaxis: yes  Glycemic control: no  Nutrition: dysphagia diet    CODE STATUS: DNR/DNI

## 2017-09-16 LAB
ANION GAP SERPL CALC-SCNC: 2 MMOL/L — LOW (ref 5–17)
BUN SERPL-MCNC: 22 MG/DL — SIGNIFICANT CHANGE UP (ref 7–23)
CALCIUM SERPL-MCNC: 8.2 MG/DL — LOW (ref 8.5–10.1)
CHLORIDE SERPL-SCNC: 101 MMOL/L — SIGNIFICANT CHANGE UP (ref 96–108)
CO2 SERPL-SCNC: 37 MMOL/L — HIGH (ref 22–31)
CREAT SERPL-MCNC: 0.49 MG/DL — LOW (ref 0.5–1.3)
GLUCOSE SERPL-MCNC: 175 MG/DL — HIGH (ref 70–99)
HCT VFR BLD CALC: 36.4 % — SIGNIFICANT CHANGE UP (ref 34.5–45)
HGB BLD-MCNC: 11.5 G/DL — SIGNIFICANT CHANGE UP (ref 11.5–15.5)
MAGNESIUM SERPL-MCNC: 2.2 MG/DL — SIGNIFICANT CHANGE UP (ref 1.6–2.6)
MCHC RBC-ENTMCNC: 31 PG — SIGNIFICANT CHANGE UP (ref 27–34)
MCHC RBC-ENTMCNC: 31.6 GM/DL — LOW (ref 32–36)
MCV RBC AUTO: 98.1 FL — SIGNIFICANT CHANGE UP (ref 80–100)
PHOSPHATE SERPL-MCNC: 4 MG/DL — SIGNIFICANT CHANGE UP (ref 2.5–4.5)
PLATELET # BLD AUTO: 223 K/UL — SIGNIFICANT CHANGE UP (ref 150–400)
POTASSIUM SERPL-MCNC: 4.4 MMOL/L — SIGNIFICANT CHANGE UP (ref 3.5–5.3)
POTASSIUM SERPL-SCNC: 4.4 MMOL/L — SIGNIFICANT CHANGE UP (ref 3.5–5.3)
RBC # BLD: 3.7 M/UL — LOW (ref 3.8–5.2)
RBC # FLD: 14 % — SIGNIFICANT CHANGE UP (ref 10.3–14.5)
SODIUM SERPL-SCNC: 140 MMOL/L — SIGNIFICANT CHANGE UP (ref 135–145)
WBC # BLD: 8.5 K/UL — SIGNIFICANT CHANGE UP (ref 3.8–10.5)
WBC # FLD AUTO: 8.5 K/UL — SIGNIFICANT CHANGE UP (ref 3.8–10.5)

## 2017-09-16 PROCEDURE — 99233 SBSQ HOSP IP/OBS HIGH 50: CPT

## 2017-09-16 RX ORDER — ALPRAZOLAM 0.25 MG
0.25 TABLET ORAL THREE TIMES A DAY
Qty: 0 | Refills: 0 | Status: DISCONTINUED | OUTPATIENT
Start: 2017-09-16 | End: 2017-09-22

## 2017-09-16 RX ADMIN — Medication 40 MILLIGRAM(S): at 05:31

## 2017-09-16 RX ADMIN — ALBUTEROL 2.5 MILLIGRAM(S): 90 AEROSOL, METERED ORAL at 19:20

## 2017-09-16 RX ADMIN — Medication 325 MILLIGRAM(S): at 11:08

## 2017-09-16 RX ADMIN — ALBUTEROL 2.5 MILLIGRAM(S): 90 AEROSOL, METERED ORAL at 14:02

## 2017-09-16 RX ADMIN — ALBUTEROL 2.5 MILLIGRAM(S): 90 AEROSOL, METERED ORAL at 07:50

## 2017-09-16 RX ADMIN — MYCOPHENOLATE MOFETIL 1000 MILLIGRAM(S): 250 CAPSULE ORAL at 21:27

## 2017-09-16 RX ADMIN — OMEPRAZOLE 40 MILLIGRAM(S): 10 CAPSULE, DELAYED RELEASE ORAL at 08:00

## 2017-09-16 RX ADMIN — Medication 0.5 MILLIGRAM(S): at 07:50

## 2017-09-16 RX ADMIN — TADALAFIL 40 MILLIGRAM(S): 10 TABLET, FILM COATED ORAL at 08:31

## 2017-09-16 RX ADMIN — ENOXAPARIN SODIUM 40 MILLIGRAM(S): 100 INJECTION SUBCUTANEOUS at 11:08

## 2017-09-16 RX ADMIN — PIPERACILLIN AND TAZOBACTAM 25 GRAM(S): 4; .5 INJECTION, POWDER, LYOPHILIZED, FOR SOLUTION INTRAVENOUS at 05:31

## 2017-09-16 RX ADMIN — MYCOPHENOLATE MOFETIL 1000 MILLIGRAM(S): 250 CAPSULE ORAL at 08:00

## 2017-09-16 RX ADMIN — ALBUTEROL 2.5 MILLIGRAM(S): 90 AEROSOL, METERED ORAL at 02:03

## 2017-09-16 RX ADMIN — Medication 0.5 MILLIGRAM(S): at 19:20

## 2017-09-16 RX ADMIN — Medication 600 MILLIGRAM(S): at 07:59

## 2017-09-16 RX ADMIN — SIMVASTATIN 20 MILLIGRAM(S): 20 TABLET, FILM COATED ORAL at 22:42

## 2017-09-16 RX ADMIN — Medication 0.25 MILLIGRAM(S): at 14:11

## 2017-09-16 RX ADMIN — Medication 600 MILLIGRAM(S): at 17:33

## 2017-09-16 RX ADMIN — Medication 1 TABLET(S): at 15:08

## 2017-09-16 RX ADMIN — PIPERACILLIN AND TAZOBACTAM 25 GRAM(S): 4; .5 INJECTION, POWDER, LYOPHILIZED, FOR SOLUTION INTRAVENOUS at 14:13

## 2017-09-16 RX ADMIN — Medication 30 MILLIGRAM(S): at 17:12

## 2017-09-16 RX ADMIN — PIPERACILLIN AND TAZOBACTAM 25 GRAM(S): 4; .5 INJECTION, POWDER, LYOPHILIZED, FOR SOLUTION INTRAVENOUS at 23:14

## 2017-09-16 NOTE — PROGRESS NOTE ADULT - SUBJECTIVE AND OBJECTIVE BOX
Neurology follow up note  COVERING DR JOÃO [id number]      Interval History:    Patient seen with sister vision better today     MEDICATIONS    piperacillin/tazobactam IVPB. 3.375 Gram(s) IV Intermittent every 8 hours  buDESOnide   0.5 milliGRAM(s) Respule 0.5 milliGRAM(s) Inhalation two times a day  ALBUTerol    0.083% 2.5 milliGRAM(s) Nebulizer every 6 hours  enoxaparin Injectable 40 milliGRAM(s) SubCutaneous daily  influenza   Vaccine 0.5 milliLiter(s) IntraMuscular once  aspirin 325 milliGRAM(s) Oral daily  spironolactone 25 milliGRAM(s) Oral daily  tyvaso inhalation 7 Puff(s) 7 Puff(s) Inhalation four times a day  simvastatin 20 milliGRAM(s) Oral at bedtime  guaiFENesin  milliGRAM(s) Oral every 12 hours  omeprazole 40 milliGRAM(s) Oral daily  azithromycin   Tablet 250 milliGRAM(s) Oral <User Schedule>  trimethoprim  160 mG/sulfamethoxazole 800 mG 1 Tablet(s) Oral <User Schedule>  mycophenolate mofetil 1000 milliGRAM(s) Oral <User Schedule>  tadalafil 40 milliGRAM(s) Oral <User Schedule>  predniSONE   Tablet 30 milliGRAM(s) Oral two times a day  ALPRAZolam 0.25 milliGRAM(s) Oral three times a day PRN      Allergies    No Known Allergies    Intolerances            Vital Signs Last 24 Hrs  T(C): 36.9 (16 Sep 2017 13:15), Max: 37 (15 Sep 2017 19:15)  T(F): 98.4 (16 Sep 2017 13:15), Max: 98.6 (15 Sep 2017 19:15)  HR: 100 (16 Sep 2017 13:15) (84 - 118)  BP: 115/73 (16 Sep 2017 13:15) (86/49 - 136/73)  BP(mean): 97 (16 Sep 2017 11:00) (61 - 97)  RR: 27 (16 Sep 2017 13:15) (0 - 62)  SpO2: 91% (16 Sep 2017 13:15) (81% - 100%)      REVIEW OF SYSTEMS:   Constitutional: No fever, chills, fatigue, weakness  Eyes: no eye pain, + visual disturbances,  ENT:  No difficulty hearing, tinnitus, vertigo; No sinus or throat pain  Neck: No pain or stiffness  Respiratory: + occ sob  Cardiovascular: No chest pain, palpitations,   Gastrointestinal: No abdominal or epigastric pain. No nausea, vomiting  No diarrhea or constipation.   Genitourinary: No dysuria, frequency, hematuria or incontinence  Neurological: No headaches, lightheadedness, vertigo, numbness or tremors  Psychiatric: No depression, anxiety, mood swings or difficulty sleeping  Musculoskeletal: No joint pain or swelling; No muscle, back or extremity pain  Skin: No itching, burning, rashes or lesions   Lymph Nodes: No enlarged glands  Endocrine: No heat or cold intolerance; No hair loss   Allergy and Immunologic: No hives or eczema    On Neurological Examination:    Mental Status - Patient is alert, awake, oriented X3.       Follow simple commands  Follow complex commands      Speech -   Fluent        Cranial Nerves - Pupils 3 mm equal and reactive to light,   extraocular eye movements intact. right blurry vision    smile symmetric  intact bilateral NLF    Motor Exam -     With stimuli positive movement of all 4 extremities    Muscle tone - is normal all over.  No asymmetry is seen.      Sensory    Bilateral intact to light touch    Gait -  normal  ataxia     GENERAL Exam: Nontoxic , No Acute Distress   	  HEENT:  normocephalic, atraumatic  		  LUNGS:  Decreased bilaterally  	  HEART: Normal S1S2   No murmur RRR        	  GI/ ABDOMEN:  Soft  Non tender    EXTREMITIES:   No Edema  No Clubbing  No Cyanosis No Edema    MUSCULOSKELETAL: Normal Range of Motion  	   SKIN: Normal  No Ecchymosis               LABS:  CBC Full  -  ( 16 Sep 2017 05:51 )  WBC Count : 8.5 K/uL  Hemoglobin : 11.5 g/dL  Hematocrit : 36.4 %  Platelet Count - Automated : 223 K/uL  Mean Cell Volume : 98.1 fl  Mean Cell Hemoglobin : 31.0 pg  Mean Cell Hemoglobin Concentration : 31.6 gm/dL  Auto Neutrophil # : x  Auto Lymphocyte # : x  Auto Monocyte # : x  Auto Eosinophil # : x  Auto Basophil # : x  Auto Neutrophil % : x  Auto Lymphocyte % : x  Auto Monocyte % : x  Auto Eosinophil % : x  Auto Basophil % : x      09-16    140  |  101  |  22  ----------------------------<  175<H>  4.4   |  37<H>  |  0.49<L>    Ca    8.2<L>      16 Sep 2017 05:51  Phos  4.0     09-16  Mg     2.2     09-16      Hemoglobin A1C:       Vitamin B12         RADIOLOGY    Physical therapy evaluation.  OOB to chair/ambulation with assistance only.  Advanced care planning was discussed with family.  Pain is accessed and addressed.  Pt was screened for signs of clinical depression. Appropriate referral made.  Risk of falls accessed. Fall prevention and plan of care was discussed with family.  Pt is screened for tobacco and alcohol use. Counseling was done for smoking and alcohol cessation.  Use of narcotic pain meds was dicussed. Pt is advised to use narcotic meds wisely and to refrain from over using them.  Plan of care was discussed with family. Questions answered.  Would continue to follow.  30 minutes spent on total encounter; more than 50% of the visit was spent counseling and/or coordinating care by the attending physician.

## 2017-09-16 NOTE — PROGRESS NOTE ADULT - SUBJECTIVE AND OBJECTIVE BOX
Date/Time Patient Seen:  		  Referring MD:   Data Reviewed	       Patient is a 72y old  Female who presents with a chief complaint of SOB x4 days, headache, forgetfulness (12 Sep 2017 19:33)  in bed  seen and examined  vs and meds reviewed        Subjective/HPI     PAST MEDICAL & SURGICAL HISTORY:  Chronic interstitial lung disease  Pulmonary hypertension  DM (diabetes mellitus): pulmonary htn  History of cholecystectomy  H/O abdominal hysterectomy: 2002        Medication list         MEDICATIONS  (STANDING):  piperacillin/tazobactam IVPB. 3.375 Gram(s) IV Intermittent every 8 hours  buDESOnide   0.5 milliGRAM(s) Respule 0.5 milliGRAM(s) Inhalation two times a day  ALBUTerol    0.083% 2.5 milliGRAM(s) Nebulizer every 6 hours  enoxaparin Injectable 40 milliGRAM(s) SubCutaneous daily  influenza   Vaccine 0.5 milliLiter(s) IntraMuscular once  aspirin 325 milliGRAM(s) Oral daily  spironolactone 25 milliGRAM(s) Oral daily  tyvaso inhalation 7 Puff(s) 7 Puff(s) Inhalation four times a day  simvastatin 20 milliGRAM(s) Oral at bedtime  guaiFENesin  milliGRAM(s) Oral every 12 hours  omeprazole 40 milliGRAM(s) Oral daily  azithromycin   Tablet 250 milliGRAM(s) Oral <User Schedule>  trimethoprim  160 mG/sulfamethoxazole 800 mG 1 Tablet(s) Oral <User Schedule>  mycophenolate mofetil 1000 milliGRAM(s) Oral <User Schedule>  tadalafil 40 milliGRAM(s) Oral <User Schedule>  predniSONE   Tablet 30 milliGRAM(s) Oral two times a day    MEDICATIONS  (PRN):         Vitals log        ICU Vital Signs Last 24 Hrs  T(C): 36.6 (15 Sep 2017 23:42), Max: 37 (15 Sep 2017 19:15)  T(F): 97.8 (15 Sep 2017 23:42), Max: 98.6 (15 Sep 2017 19:15)  HR: 94 (16 Sep 2017 04:00) (84 - 107)  BP: 102/57 (16 Sep 2017 04:00) (86/49 - 119/66)  BP(mean): 73 (16 Sep 2017 04:00) (61 - 89)  ABP: --  ABP(mean): --  RR: 24 (16 Sep 2017 04:00) (0 - 35)  SpO2: 99% (16 Sep 2017 04:00) (86% - 100%)           Input and Output:  I&O's Detail    14 Sep 2017 07:01  -  15 Sep 2017 07:00  --------------------------------------------------------  IN:    Oral Fluid: 780 mL  Total IN: 780 mL    OUT:    Voided: 300 mL  Total OUT: 300 mL    Total NET: 480 mL      15 Sep 2017 07:01  -  16 Sep 2017 06:03  --------------------------------------------------------  IN:    IV PiggyBack: 100 mL    Oral Fluid: 770 mL    Solution: 100 mL  Total IN: 970 mL    OUT:    Voided: 900 mL  Total OUT: 900 mL    Total NET: 70 mL          Lab Data                        11.3   7.3   )-----------( 214      ( 15 Sep 2017 06:38 )             36.3     09-15    140  |  99  |  24<H>  ----------------------------<  166<H>  4.7   |  37<H>  |  0.62    Ca    8.4<L>      15 Sep 2017 06:38  Phos  4.1     09-15  Mg     2.3     09-15              Review of Systems	      Objective     Physical Examination  kyphosis  heart - s1s2  lungs - dec BS  abd - soft        Pertinent Lab findings & Imaging      Francie:  NO   Adequate UO     I&O's Detail    14 Sep 2017 07:01  -  15 Sep 2017 07:00  --------------------------------------------------------  IN:    Oral Fluid: 780 mL  Total IN: 780 mL    OUT:    Voided: 300 mL  Total OUT: 300 mL    Total NET: 480 mL      15 Sep 2017 07:01  -  16 Sep 2017 06:03  --------------------------------------------------------  IN:    IV PiggyBack: 100 mL    Oral Fluid: 770 mL    Solution: 100 mL  Total IN: 970 mL    OUT:    Voided: 900 mL  Total OUT: 900 mL    Total NET: 70 mL               Discussed with:     Cultures:	        Radiology

## 2017-09-16 NOTE — PROGRESS NOTE ADULT - SUBJECTIVE AND OBJECTIVE BOX
Patient is a 72y old  Female who presents with a chief complaint of SOB x4 days, headache, forgetfulness (12 Sep 2017 19:33)      INTERVAL HPI/OVERNIGHT EVENTS: pt much better, pulm noted, await transfer out of icu, mri noted    MEDICATIONS  (STANDING):  piperacillin/tazobactam IVPB. 3.375 Gram(s) IV Intermittent every 8 hours  buDESOnide   0.5 milliGRAM(s) Respule 0.5 milliGRAM(s) Inhalation two times a day  ALBUTerol    0.083% 2.5 milliGRAM(s) Nebulizer every 6 hours  enoxaparin Injectable 40 milliGRAM(s) SubCutaneous daily  influenza   Vaccine 0.5 milliLiter(s) IntraMuscular once  aspirin 325 milliGRAM(s) Oral daily  spironolactone 25 milliGRAM(s) Oral daily  tyvaso inhalation 7 Puff(s) 7 Puff(s) Inhalation four times a day  simvastatin 20 milliGRAM(s) Oral at bedtime  guaiFENesin  milliGRAM(s) Oral every 12 hours  omeprazole 40 milliGRAM(s) Oral daily  azithromycin   Tablet 250 milliGRAM(s) Oral <User Schedule>  trimethoprim  160 mG/sulfamethoxazole 800 mG 1 Tablet(s) Oral <User Schedule>  mycophenolate mofetil 1000 milliGRAM(s) Oral <User Schedule>  tadalafil 40 milliGRAM(s) Oral <User Schedule>  predniSONE   Tablet 30 milliGRAM(s) Oral two times a day    MEDICATIONS  (PRN):      Allergies    No Known Allergies    Intolerances        REVIEW OF SYSTEMS:  CONSTITUTIONAL: No fever, weight loss, or fatigue  EYES: No eye pain, visual disturbances  ENMT:  No difficulty hearing, tinnitus, vertigo; No sinus or throat pain  NECK: No pain or stiffness  RESPIRATORY: No cough, wheezing, chills or hemoptysis; No shortness of breath  CARDIOVASCULAR: No chest pain, palpitations, dizziness  GASTROINTESTINAL: No abdominal or epigastric pain. No nausea, vomiting, or hematemesis; No diarrhea or constipation. No melena or hematochezia.  GENITOURINARY: No dysuria, frequency, hematuria, or incontinence  NEUROLOGICAL: No headaches, memory loss, loss of strength, numbness, or tremors  SKIN: No itching, burning  LYMPH NODES: No enlarged glands  MUSCULOSKELETAL: No joint pain or swelling; No muscle, back, or extremity pain  PSYCHIATRIC: No depression, mood swings  HEME/LYMPH: No easy bruising, or bleeding gums  ALLERGY AND IMMUNOLOGIC: No hives    Vital Signs Last 24 Hrs  T(C): 36.6 (16 Sep 2017 07:49), Max: 37 (15 Sep 2017 19:15)  T(F): 97.9 (16 Sep 2017 07:49), Max: 98.6 (15 Sep 2017 19:15)  HR: 100 (16 Sep 2017 10:00) (84 - 107)  BP: 114/59 (16 Sep 2017 10:00) (86/49 - 136/73)  BP(mean): 81 (16 Sep 2017 10:00) (61 - 94)  RR: 49 (16 Sep 2017 10:00) (0 - 49)  SpO2: 92% (16 Sep 2017 10:00) (86% - 100%)    PHYSICAL EXAM:  GENERAL: NAD, well-groomed, well-developed  HEAD:  Atraumatic, Normocephalic  EYES: EOMI, PERRLA, conjunctiva and sclera clear  ENMT: No tonsillar erythema, exudates, or enlargement   NECK: Supple, No JVD  NERVOUS SYSTEM:  Alert & Oriented X3, Good concentration  CHEST/LUNG: Clear to auscultation bilaterally; No rales, rhonchi, wheezing  HEART: Regular rate and rhythm  ABDOMEN: Soft, Nontender, Nondistended; Bowel sounds present  EXTREMITIES:  2+ Peripheral Pulses   LYMPH: No lymphadenopathy noted  SKIN: No rashes     LABS:                        11.5   8.5   )-----------( 223      ( 16 Sep 2017 05:51 )             36.4     16 Sep 2017 05:51    140    |  101    |  22     ----------------------------<  175    4.4     |  37     |  0.49     Ca    8.2        16 Sep 2017 05:51  Phos  4.0       16 Sep 2017 05:51  Mg     2.2       16 Sep 2017 05:51          CAPILLARY BLOOD GLUCOSE  163 (15 Sep 2017 19:00)  234 (15 Sep 2017 12:00)                RADIOLOGY & ADDITIONAL TESTS:  < from: MRA Head w/o Cont (09.15.17 @ 18:43) >  EXAM:  MRA HEAD W O CONTRAST                          EXAM:  MRI BRAIN W O CONTRAST                            PROCEDURE DATE:  09/15/2017          INTERPRETATION:  MRI BRAIN WITHOUT CONTRAST  MRA HEAD WITHOUT CONTRAST    HISTORY: stroke.    COMPARISON: CT head 9/12/2017.    TECHNIQUE:   MRI brain: Multiplanar and multisequential MR imaging of the brain was   performed without Gadavist intravenous contrast.  MRA head: MRA of the [place] without contrast was performed   using time-of-flight technique. 3D/MIP reconstructions were created on a   separate workstation.    FINDINGS:    MRI Brain:  There is an area of diffusion restriction in the left occipital temporal   region with associated T2/FLAIR hyperintensity compatible with   acute/subacute infarct. Minimal foci of petechial hemorrhages is seen in   the left occipital lobe infarct. A tiny focus of diffusion restriction   with associated T2/FLAIR hyperintensity in the right temporal lobe is   also compatible with acute/subacute infarct.    The ventricles and cortical sulci are prominent reflecting parenchymal   volume loss. No intracranial hemorrhage, mass effect or midline shift.   The basal cisterns are patent.    Minimal foci of T2/FLAIR hyperintensity are noted in theperiventricular   white matter, nonspecific but likely sequela of small vessel ischemic   disease.    The paranasal sinuses and mastoid air cells are well aerated.    MRA Head:  The petrous, cavernous, and supraclinoid portions of the internal carotid   arteries appear normal. The A1 and A2 segments of the anterior cerebral   arteries, as well as the anterior communicating artery complex, appear   within normal limits. The M1 segments of the middle cerebral arteries,   including the bifurcations,appear normal. The left posterior   communicating artery is seen.    The intracranial vertebral arteries appear normal. The basilar artery   appears normal. The posterior cerebral and superior cerebellar arteries   arise normally from the basilar artery.    The distal portions of the left posterior cerebral artery in the infarct   bed, the distal P3 and P4 branches are irregular, likely due to stenosis.     There is no aneurysm visualized in the anterior or posterior circulation   of the brain.    MRA findings were confirmed on the 3D reformatted images.    IMPRESSION:    < end of copied text >        Consultant(s) Notes Reviewed:  [ x] YES  [ ] NO    Care Discussed with Consultants/Other Providers [x ] YES  [ ] NO    Advanced Care Planning Discussed with Patien/Family [ ] YES  [x ] NO

## 2017-09-16 NOTE — PROGRESS NOTE ADULT - SUBJECTIVE AND OBJECTIVE BOX
Patient is a 72y old  Female who presents with a chief complaint of SOB x4 days, headache, forgetfulness (12 Sep 2017 19:33)    24 hour events: feels better today  no new complaints    REVIEW OF SYSTEMS  Constitutional: No fever, chills, fatigue  Neuro: No headache, numbness, weakness  Resp: No cough, wheezing, shortness of breath  CVS: No chest pain, palpitations, leg swelling  GI: No abdominal pain, nausea, vomiting, diarrhea   : No dysuria, frequency, incontinence  Skin: No itching, burning, rashes, or lesions   Msk: No joint pain or swelling  Psych: No depression, anxiety, mood swings    T(F): 97.9 (09-16-17 @ 07:49), Max: 98.6 (09-15-17 @ 19:15)  HR: 100 (09-16-17 @ 10:00) (84 - 107)  BP: 114/59 (09-16-17 @ 10:00) (86/49 - 136/73)  RR: 49 (09-16-17 @ 10:00) (0 - 49)  SpO2: 92% (09-16-17 @ 10:00) (86% - 100%)    CAPILLARY BLOOD GLUCOSE  163 (09-15 @ 19:00), 234 (09-15 @ 12:00)    I&O's Summary    09-15 @ 07:01  -  09-16 @ 07:00  --------------------------------------------------------  IN: 1070 mL / OUT: 1300 mL / NET: -230 mL    PHYSICAL EXAM  General: mild resp distress while talking  CNS: AAO x 3, moves all extremities, speech clear  HEENT: pupils reactive b/l  Resp: bibasilar coarse crackles, no wheeze  CVS: S1S2, regular  Abd: soft, NT, +BS  Ext: no edema  Skin: warm    MEDICATIONS  piperacillin/tazobactam IVPB. IV Intermittent  azithromycin   Tablet Oral  trimethoprim  160 mG/sulfamethoxazole 800 mG Oral  spironolactone Oral  tadalafil Oral  simvastatin Oral  predniSONE   Tablet Oral  buDESOnide   0.5 milliGRAM(s) Respule Inhalation  ALBUTerol    0.083% Nebulizer  guaiFENesin ER Oral  aspirin Oral  enoxaparin Injectable SubCutaneous  omeprazole Oral  influenza   Vaccine IntraMuscular  mycophenolate mofetil Oral  tyvaso inhalation 7 Puff(s) Inhalation                        11.5   8.5   )-----------( 223      ( 16 Sep 2017 05:51 )             36.4     09-16    140  |  101  |  22  ----------------------------<  175<H>  4.4   |  37<H>  |  0.49<L>    Ca    8.2<L>      16 Sep 2017 05:51  Phos  4.0     09-16  Mg     2.2     09-16    Rapid RVP Result: NotDetec (09-12 @ 20:06)    MRI Head w/o Cont (09.15.17 @ 18:24)  Acute/subacute infarct in the left occipital temporal region, in the left   PCA territory. Minimal petechial hemorrhage in the left occipital lobe.  Tiny acute/subacute infarct in the right temporal lobe.  MRA of the head demonstrates irregularity of the distal left posterior   cerebral artery in the infarct bed, likely due to stenosis.    CENTRAL LINE: N           MARI: N                    A-LINE: N                       GLOBAL ISSUE/BEST PRACTICE  Analgesia: NA  Sedation: NA  CAM-ICU: neg  HOB elevation: yes  Stress ulcer prophylaxis: NA  VTE prophylaxis: Y  Glycemic control: Y  Nutrition: Y    CODE STATUS: DNR, DNI  GOC discussion: Y

## 2017-09-17 LAB
ANION GAP SERPL CALC-SCNC: 6 MMOL/L — SIGNIFICANT CHANGE UP (ref 5–17)
BUN SERPL-MCNC: 24 MG/DL — HIGH (ref 7–23)
CALCIUM SERPL-MCNC: 8.6 MG/DL — SIGNIFICANT CHANGE UP (ref 8.5–10.1)
CHLORIDE SERPL-SCNC: 99 MMOL/L — SIGNIFICANT CHANGE UP (ref 96–108)
CO2 SERPL-SCNC: 36 MMOL/L — HIGH (ref 22–31)
CREAT SERPL-MCNC: 0.39 MG/DL — LOW (ref 0.5–1.3)
CULTURE RESULTS: SIGNIFICANT CHANGE UP
CULTURE RESULTS: SIGNIFICANT CHANGE UP
GLUCOSE SERPL-MCNC: 143 MG/DL — HIGH (ref 70–99)
HCT VFR BLD CALC: 41.4 % — SIGNIFICANT CHANGE UP (ref 34.5–45)
HGB BLD-MCNC: 13 G/DL — SIGNIFICANT CHANGE UP (ref 11.5–15.5)
MAGNESIUM SERPL-MCNC: 2.4 MG/DL — SIGNIFICANT CHANGE UP (ref 1.6–2.6)
MCHC RBC-ENTMCNC: 30.9 PG — SIGNIFICANT CHANGE UP (ref 27–34)
MCHC RBC-ENTMCNC: 31.4 GM/DL — LOW (ref 32–36)
MCV RBC AUTO: 98.5 FL — SIGNIFICANT CHANGE UP (ref 80–100)
PHOSPHATE SERPL-MCNC: 3.7 MG/DL — SIGNIFICANT CHANGE UP (ref 2.5–4.5)
PLATELET # BLD AUTO: 260 K/UL — SIGNIFICANT CHANGE UP (ref 150–400)
POTASSIUM SERPL-MCNC: 4.2 MMOL/L — SIGNIFICANT CHANGE UP (ref 3.5–5.3)
POTASSIUM SERPL-SCNC: 4.2 MMOL/L — SIGNIFICANT CHANGE UP (ref 3.5–5.3)
RBC # BLD: 4.2 M/UL — SIGNIFICANT CHANGE UP (ref 3.8–5.2)
RBC # FLD: 13.9 % — SIGNIFICANT CHANGE UP (ref 10.3–14.5)
SODIUM SERPL-SCNC: 141 MMOL/L — SIGNIFICANT CHANGE UP (ref 135–145)
SPECIMEN SOURCE: SIGNIFICANT CHANGE UP
SPECIMEN SOURCE: SIGNIFICANT CHANGE UP
WBC # BLD: 7 K/UL — SIGNIFICANT CHANGE UP (ref 3.8–10.5)
WBC # FLD AUTO: 7 K/UL — SIGNIFICANT CHANGE UP (ref 3.8–10.5)

## 2017-09-17 RX ORDER — NYSTATIN CREAM 100000 [USP'U]/G
1 CREAM TOPICAL
Qty: 0 | Refills: 0 | Status: DISCONTINUED | OUTPATIENT
Start: 2017-09-17 | End: 2017-09-22

## 2017-09-17 RX ADMIN — ALBUTEROL 2.5 MILLIGRAM(S): 90 AEROSOL, METERED ORAL at 00:51

## 2017-09-17 RX ADMIN — Medication 600 MILLIGRAM(S): at 21:05

## 2017-09-17 RX ADMIN — Medication 0.5 MILLIGRAM(S): at 19:09

## 2017-09-17 RX ADMIN — ALBUTEROL 2.5 MILLIGRAM(S): 90 AEROSOL, METERED ORAL at 19:09

## 2017-09-17 RX ADMIN — Medication 600 MILLIGRAM(S): at 08:03

## 2017-09-17 RX ADMIN — Medication 0.25 MILLIGRAM(S): at 15:25

## 2017-09-17 RX ADMIN — Medication 325 MILLIGRAM(S): at 11:09

## 2017-09-17 RX ADMIN — SIMVASTATIN 20 MILLIGRAM(S): 20 TABLET, FILM COATED ORAL at 21:05

## 2017-09-17 RX ADMIN — ENOXAPARIN SODIUM 40 MILLIGRAM(S): 100 INJECTION SUBCUTANEOUS at 11:09

## 2017-09-17 RX ADMIN — Medication 0.5 MILLIGRAM(S): at 08:06

## 2017-09-17 RX ADMIN — Medication 30 MILLIGRAM(S): at 08:03

## 2017-09-17 RX ADMIN — ALBUTEROL 2.5 MILLIGRAM(S): 90 AEROSOL, METERED ORAL at 08:06

## 2017-09-17 RX ADMIN — MYCOPHENOLATE MOFETIL 1000 MILLIGRAM(S): 250 CAPSULE ORAL at 21:05

## 2017-09-17 RX ADMIN — ALBUTEROL 2.5 MILLIGRAM(S): 90 AEROSOL, METERED ORAL at 13:39

## 2017-09-17 RX ADMIN — TADALAFIL 40 MILLIGRAM(S): 10 TABLET, FILM COATED ORAL at 08:03

## 2017-09-17 RX ADMIN — NYSTATIN CREAM 1 APPLICATION(S): 100000 CREAM TOPICAL at 17:17

## 2017-09-17 RX ADMIN — SPIRONOLACTONE 25 MILLIGRAM(S): 25 TABLET, FILM COATED ORAL at 08:03

## 2017-09-17 RX ADMIN — MYCOPHENOLATE MOFETIL 1000 MILLIGRAM(S): 250 CAPSULE ORAL at 08:04

## 2017-09-17 RX ADMIN — OMEPRAZOLE 40 MILLIGRAM(S): 10 CAPSULE, DELAYED RELEASE ORAL at 08:03

## 2017-09-17 RX ADMIN — Medication 30 MILLIGRAM(S): at 21:05

## 2017-09-17 NOTE — PROGRESS NOTE ADULT - SUBJECTIVE AND OBJECTIVE BOX
Neurology follow up note  COVERING DR JOÃO [id number]      Interval History:      Patient vision better    MEDICATIONS    buDESOnide   0.5 milliGRAM(s) Respule 0.5 milliGRAM(s) Inhalation two times a day  ALBUTerol    0.083% 2.5 milliGRAM(s) Nebulizer every 6 hours  enoxaparin Injectable 40 milliGRAM(s) SubCutaneous daily  influenza   Vaccine 0.5 milliLiter(s) IntraMuscular once  aspirin 325 milliGRAM(s) Oral daily  spironolactone 25 milliGRAM(s) Oral daily  tyvaso inhalation 7 Puff(s) 7 Puff(s) Inhalation four times a day  simvastatin 20 milliGRAM(s) Oral at bedtime  guaiFENesin  milliGRAM(s) Oral every 12 hours  omeprazole 40 milliGRAM(s) Oral daily  azithromycin   Tablet 250 milliGRAM(s) Oral <User Schedule>  trimethoprim  160 mG/sulfamethoxazole 800 mG 1 Tablet(s) Oral <User Schedule>  mycophenolate mofetil 1000 milliGRAM(s) Oral <User Schedule>  tadalafil 40 milliGRAM(s) Oral <User Schedule>  predniSONE   Tablet 30 milliGRAM(s) Oral two times a day  ALPRAZolam 0.25 milliGRAM(s) Oral three times a day PRN      Allergies    No Known Allergies    Intolerances            Vital Signs Last 24 Hrs  T(C): 36.3 (17 Sep 2017 08:00), Max: 36.9 (16 Sep 2017 13:15)  T(F): 97.4 (17 Sep 2017 08:00), Max: 98.4 (16 Sep 2017 13:15)  HR: 100 (17 Sep 2017 08:00) (73 - 118)  BP: 125/83 (17 Sep 2017 08:00) (106/67 - 130/78)  BP(mean): 97 (16 Sep 2017 11:00) (81 - 97)  RR: 26 (17 Sep 2017 08:00) (16 - 62)  SpO2: 89% (17 Sep 2017 08:00) (81% - 109%)          REVIEW OF SYSTEMS:   Constitutional: No fever, chills, fatigue, weakness  Eyes: no eye pain, + visual disturbances,  ENT:  No difficulty hearing, tinnitus, vertigo; No sinus or throat pain  Neck: No pain or stiffness  Respiratory: + occ sob  Cardiovascular: No chest pain, palpitations,   Gastrointestinal: No abdominal or epigastric pain. No nausea, vomiting  No diarrhea or constipation.   Genitourinary: No dysuria, frequency, hematuria or incontinence  Neurological: No headaches, lightheadedness, vertigo, numbness or tremors  Psychiatric: No depression, anxiety, mood swings or difficulty sleeping  Musculoskeletal: No joint pain or swelling; No muscle, back or extremity pain  Skin: No itching, burning, rashes or lesions   Lymph Nodes: No enlarged glands  Endocrine: No heat or cold intolerance; No hair loss   Allergy and Immunologic: No hives or eczema    On Neurological Examination:    Mental Status - Patient is alert, awake, oriented X3.       Follow simple commands  Follow complex commands      Speech -   Fluent        Cranial Nerves - Pupils 3 mm equal and reactive to light,   extraocular eye movements intact. right blurry vision    smile symmetric  intact bilateral NLF    Motor Exam -     With stimuli positive movement of all 4 extremities    Muscle tone - is normal all over.  No asymmetry is seen.      Sensory    Bilateral intact to light touch    GENERAL Exam: Nontoxic , No Acute Distress   	  HEENT:  normocephalic, atraumatic  		  LUNGS:  Decreased bilaterally  	  HEART: Normal S1S2   No murmur RRR        	  GI/ ABDOMEN:  Soft  Non tender    EXTREMITIES:   No Edema  No Clubbing  No Cyanosis No Edema    MUSCULOSKELETAL: Normal Range of Motion  	   SKIN: Normal  No Ecchymosis               LABS:  CBC Full  -  ( 17 Sep 2017 08:45 )  WBC Count : 7.0 K/uL  Hemoglobin : 13.0 g/dL  Hematocrit : 41.4 %  Platelet Count - Automated : 260 K/uL  Mean Cell Volume : 98.5 fl  Mean Cell Hemoglobin : 30.9 pg  Mean Cell Hemoglobin Concentration : 31.4 gm/dL  Auto Neutrophil # : x  Auto Lymphocyte # : x  Auto Monocyte # : x  Auto Eosinophil # : x  Auto Basophil # : x  Auto Neutrophil % : x  Auto Lymphocyte % : x  Auto Monocyte % : x  Auto Eosinophil % : x  Auto Basophil % : x      09-17    141  |  99  |  24<H>  ----------------------------<  143<H>  4.2   |  36<H>  |  0.39<L>    Ca    8.6      17 Sep 2017 08:45  Phos  3.7     09-17  Mg     2.4     09-17      Hemoglobin A1C:       Vitamin B12         RADIOLOGY      Assessment and Plan:      Problem: CVA (cerebral vascular accident).  Plan: aspirin  statin   tele eval.     Physical therapy evaluation.  OOB to chair/ambulation with assistance only.  .  20 minutes spent on total encounter; more than 50% of the visit was spent counseling and/or coordinating care by the attending physician. Neurology follow up note  COVERING DR JOÃO [id number]      Interval History:      Patient vision better  seen with spouse   MEDICATIONS    buDESOnide   0.5 milliGRAM(s) Respule 0.5 milliGRAM(s) Inhalation two times a day  ALBUTerol    0.083% 2.5 milliGRAM(s) Nebulizer every 6 hours  enoxaparin Injectable 40 milliGRAM(s) SubCutaneous daily  influenza   Vaccine 0.5 milliLiter(s) IntraMuscular once  aspirin 325 milliGRAM(s) Oral daily  spironolactone 25 milliGRAM(s) Oral daily  tyvaso inhalation 7 Puff(s) 7 Puff(s) Inhalation four times a day  simvastatin 20 milliGRAM(s) Oral at bedtime  guaiFENesin  milliGRAM(s) Oral every 12 hours  omeprazole 40 milliGRAM(s) Oral daily  azithromycin   Tablet 250 milliGRAM(s) Oral <User Schedule>  trimethoprim  160 mG/sulfamethoxazole 800 mG 1 Tablet(s) Oral <User Schedule>  mycophenolate mofetil 1000 milliGRAM(s) Oral <User Schedule>  tadalafil 40 milliGRAM(s) Oral <User Schedule>  predniSONE   Tablet 30 milliGRAM(s) Oral two times a day  ALPRAZolam 0.25 milliGRAM(s) Oral three times a day PRN      Allergies    No Known Allergies    Intolerances            Vital Signs Last 24 Hrs  T(C): 36.3 (17 Sep 2017 08:00), Max: 36.9 (16 Sep 2017 13:15)  T(F): 97.4 (17 Sep 2017 08:00), Max: 98.4 (16 Sep 2017 13:15)  HR: 100 (17 Sep 2017 08:00) (73 - 118)  BP: 125/83 (17 Sep 2017 08:00) (106/67 - 130/78)  BP(mean): 97 (16 Sep 2017 11:00) (81 - 97)  RR: 26 (17 Sep 2017 08:00) (16 - 62)  SpO2: 89% (17 Sep 2017 08:00) (81% - 109%)          REVIEW OF SYSTEMS:   Constitutional: No fever, chills, fatigue, weakness  Eyes: no eye pain, + visual disturbances,  ENT:  No difficulty hearing, tinnitus, vertigo; No sinus or throat pain  Neck: No pain or stiffness  Respiratory: + occ sob  Cardiovascular: No chest pain, palpitations,   Gastrointestinal: No abdominal or epigastric pain. No nausea, vomiting  No diarrhea or constipation.   Genitourinary: No dysuria, frequency, hematuria or incontinence  Neurological: No headaches, lightheadedness, vertigo, numbness or tremors  Psychiatric: No depression, anxiety, mood swings or difficulty sleeping  Musculoskeletal: No joint pain or swelling; No muscle, back or extremity pain  Skin: No itching, burning, rashes or lesions   Lymph Nodes: No enlarged glands  Endocrine: No heat or cold intolerance; No hair loss   Allergy and Immunologic: No hives or eczema    On Neurological Examination:    Mental Status - Patient is alert, awake, oriented X3.       Follow simple commands  Follow complex commands      Speech -   Fluent        Cranial Nerves - Pupils 3 mm equal and reactive to light,   extraocular eye movements intact. right blurry vision    smile symmetric  intact bilateral NLF    Motor Exam -     With stimuli positive movement of all 4 extremities    Muscle tone - is normal all over.  No asymmetry is seen.      Sensory    Bilateral intact to light touch    GENERAL Exam: Nontoxic , No Acute Distress   	  HEENT:  normocephalic, atraumatic  		  LUNGS:  Decreased bilaterally  	  HEART: Normal S1S2   No murmur RRR        	  GI/ ABDOMEN:  Soft  Non tender    EXTREMITIES:   No Edema  No Clubbing  No Cyanosis No Edema    MUSCULOSKELETAL: Normal Range of Motion  	   SKIN: Normal  No Ecchymosis               LABS:  CBC Full  -  ( 17 Sep 2017 08:45 )  WBC Count : 7.0 K/uL  Hemoglobin : 13.0 g/dL  Hematocrit : 41.4 %  Platelet Count - Automated : 260 K/uL  Mean Cell Volume : 98.5 fl  Mean Cell Hemoglobin : 30.9 pg  Mean Cell Hemoglobin Concentration : 31.4 gm/dL  Auto Neutrophil # : x  Auto Lymphocyte # : x  Auto Monocyte # : x  Auto Eosinophil # : x  Auto Basophil # : x  Auto Neutrophil % : x  Auto Lymphocyte % : x  Auto Monocyte % : x  Auto Eosinophil % : x  Auto Basophil % : x      09-17    141  |  99  |  24<H>  ----------------------------<  143<H>  4.2   |  36<H>  |  0.39<L>    Ca    8.6      17 Sep 2017 08:45  Phos  3.7     09-17  Mg     2.4     09-17      Hemoglobin A1C:       Vitamin B12         RADIOLOGY      Assessment and Plan:      Problem: CVA (cerebral vascular accident).  Plan: aspirin  statin   tele eval.     Physical therapy evaluation.  OOB to chair/ambulation with assistance only.  .  20 minutes spent on total encounter; more than 50% of the visit was spent counseling and/or coordinating care by the attending physician.

## 2017-09-17 NOTE — PROGRESS NOTE ADULT - SUBJECTIVE AND OBJECTIVE BOX
Patient is a 72y old  Female who presents with a chief complaint of SOB x4 days, headache, forgetfulness (12 Sep 2017 19:33)      INTERVAL HPI/OVERNIGHT EVENTS:stable, out of icu now    MEDICATIONS  (STANDING):  buDESOnide   0.5 milliGRAM(s) Respule 0.5 milliGRAM(s) Inhalation two times a day  ALBUTerol    0.083% 2.5 milliGRAM(s) Nebulizer every 6 hours  enoxaparin Injectable 40 milliGRAM(s) SubCutaneous daily  influenza   Vaccine 0.5 milliLiter(s) IntraMuscular once  aspirin 325 milliGRAM(s) Oral daily  spironolactone 25 milliGRAM(s) Oral daily  tyvaso inhalation 7 Puff(s) 7 Puff(s) Inhalation four times a day  simvastatin 20 milliGRAM(s) Oral at bedtime  guaiFENesin  milliGRAM(s) Oral every 12 hours  omeprazole 40 milliGRAM(s) Oral daily  azithromycin   Tablet 250 milliGRAM(s) Oral <User Schedule>  trimethoprim  160 mG/sulfamethoxazole 800 mG 1 Tablet(s) Oral <User Schedule>  mycophenolate mofetil 1000 milliGRAM(s) Oral <User Schedule>  tadalafil 40 milliGRAM(s) Oral <User Schedule>  predniSONE   Tablet 30 milliGRAM(s) Oral two times a day    MEDICATIONS  (PRN):  ALPRAZolam 0.25 milliGRAM(s) Oral three times a day PRN anxiety      Allergies    No Known Allergies    Intolerances        REVIEW OF SYSTEMS:  CONSTITUTIONAL: No fever, weight loss, or fatigue  EYES: No eye pain, visual disturbances  ENMT:  No difficulty hearing, tinnitus, vertigo; No sinus or throat pain  NECK: No pain or stiffness  RESPIRATORY: sob  CARDIOVASCULAR: No chest pain, palpitations, dizziness  GASTROINTESTINAL: No abdominal or epigastric pain. No nausea, vomiting, or hematemesis; No diarrhea or constipation. No melena or hematochezia.  GENITOURINARY: No dysuria, frequency, hematuria, or incontinence  NEUROLOGICAL: No headaches, memory loss, loss of strength, numbness, or tremors  SKIN: No itching, burning  LYMPH NODES: No enlarged glands  MUSCULOSKELETAL: No joint pain or swelling; No muscle, back, or extremity pain  PSYCHIATRIC: No depression, mood swings  HEME/LYMPH: No easy bruising, or bleeding gums  ALLERGY AND IMMUNOLOGIC: No hives    Vital Signs Last 24 Hrs  T(C): 36.3 (17 Sep 2017 08:00), Max: 36.9 (16 Sep 2017 13:15)  T(F): 97.4 (17 Sep 2017 08:00), Max: 98.4 (16 Sep 2017 13:15)  HR: 100 (17 Sep 2017 08:00) (73 - 115)  BP: 125/83 (17 Sep 2017 08:00) (106/67 - 130/78)  BP(mean): --  RR: 26 (17 Sep 2017 08:00) (16 - 27)  SpO2: 89% (17 Sep 2017 08:00) (89% - 109%)    PHYSICAL EXAM:  GENERAL: NAD, well-groomed, well-developed  HEAD:  Atraumatic, Normocephalic  EYES: EOMI, PERRLA, conjunctiva and sclera clear  ENMT: No tonsillar erythema, exudates, or enlargement   NECK: Supple, No JVD  NERVOUS SYSTEM:  Alert & Oriented X3, Good concentration  CHEST/LUNG: Clear to auscultation bilaterally; No rales, rhonchi, wheezing  HEART: Regular rate and rhythm  ABDOMEN: Soft, Nontender, Nondistended; Bowel sounds present  EXTREMITIES:  2+ Peripheral Pulses   LYMPH: No lymphadenopathy noted  SKIN: No rashes     LABS:                        13.0   7.0   )-----------( 260      ( 17 Sep 2017 08:45 )             41.4     17 Sep 2017 08:45    141    |  99     |  24     ----------------------------<  143    4.2     |  36     |  0.39     Ca    8.6        17 Sep 2017 08:45  Phos  3.7       17 Sep 2017 08:45  Mg     2.4       17 Sep 2017 08:45          CAPILLARY BLOOD GLUCOSE  260 (17 Sep 2017 00:27)                RADIOLOGY & ADDITIONAL TESTS:  no new      Consultant(s) Notes Reviewed:  [x ] YES  [ ] NO    Care Discussed with Consultants/Other Providers x[ ] YES  [ ] NO    Advanced Care Planning Discussed with Patien/Family [ ] YES  [ x] NO

## 2017-09-17 NOTE — PROGRESS NOTE ADULT - SUBJECTIVE AND OBJECTIVE BOX
Date/Time Patient Seen:  		  Referring MD:   Data Reviewed	       Patient is a 72y old  Female who presents with a chief complaint of SOB x4 days, headache, forgetfulness (12 Sep 2017 19:33)  in bed  seen and examined  vs and meds reviewed  on ABX for Proph      Subjective/HPI     PAST MEDICAL & SURGICAL HISTORY:  Chronic interstitial lung disease  Pulmonary hypertension  DM (diabetes mellitus): pulmonary htn  History of cholecystectomy  H/O abdominal hysterectomy: 2002        Medication list         MEDICATIONS  (STANDING):  buDESOnide   0.5 milliGRAM(s) Respule 0.5 milliGRAM(s) Inhalation two times a day  ALBUTerol    0.083% 2.5 milliGRAM(s) Nebulizer every 6 hours  enoxaparin Injectable 40 milliGRAM(s) SubCutaneous daily  influenza   Vaccine 0.5 milliLiter(s) IntraMuscular once  aspirin 325 milliGRAM(s) Oral daily  spironolactone 25 milliGRAM(s) Oral daily  tyvaso inhalation 7 Puff(s) 7 Puff(s) Inhalation four times a day  simvastatin 20 milliGRAM(s) Oral at bedtime  guaiFENesin  milliGRAM(s) Oral every 12 hours  omeprazole 40 milliGRAM(s) Oral daily  azithromycin   Tablet 250 milliGRAM(s) Oral <User Schedule>  trimethoprim  160 mG/sulfamethoxazole 800 mG 1 Tablet(s) Oral <User Schedule>  mycophenolate mofetil 1000 milliGRAM(s) Oral <User Schedule>  tadalafil 40 milliGRAM(s) Oral <User Schedule>  predniSONE   Tablet 30 milliGRAM(s) Oral two times a day    MEDICATIONS  (PRN):  ALPRAZolam 0.25 milliGRAM(s) Oral three times a day PRN anxiety         Vitals log        ICU Vital Signs Last 24 Hrs  T(C): 36.2 (17 Sep 2017 05:46), Max: 36.9 (16 Sep 2017 13:15)  T(F): 97.1 (17 Sep 2017 05:46), Max: 98.4 (16 Sep 2017 13:15)  HR: 74 (17 Sep 2017 05:46) (73 - 118)  BP: 106/67 (17 Sep 2017 05:46) (106/67 - 136/73)  BP(mean): 97 (16 Sep 2017 11:00) (81 - 97)  ABP: --  ABP(mean): --  RR: 16 (17 Sep 2017 05:46) (16 - 62)  SpO2: 97% (17 Sep 2017 05:46) (81% - 109%)           Input and Output:  I&O's Detail    15 Sep 2017 07:01  -  16 Sep 2017 07:00  --------------------------------------------------------  IN:    IV PiggyBack: 100 mL    Oral Fluid: 770 mL    Solution: 200 mL  Total IN: 1070 mL    OUT:    Voided: 1300 mL  Total OUT: 1300 mL    Total NET: -230 mL      16 Sep 2017 07:01  -  17 Sep 2017 06:32  --------------------------------------------------------  IN:    Oral Fluid: 480 mL  Total IN: 480 mL    OUT:    Voided: 300 mL  Total OUT: 300 mL    Total NET: 180 mL          Lab Data                        11.5   8.5   )-----------( 223      ( 16 Sep 2017 05:51 )             36.4     09-16    140  |  101  |  22  ----------------------------<  175<H>  4.4   |  37<H>  |  0.49<L>    Ca    8.2<L>      16 Sep 2017 05:51  Phos  4.0     09-16  Mg     2.2     09-16              Review of Systems	      Objective     Physical Examination  frail  anxious  kyphosis  head at  heart - s1s2  lungs - dec BS  abd - soft        Pertinent Lab findings & Imaging      Francie:  NO   Adequate UO     I&O's Detail    15 Sep 2017 07:01  -  16 Sep 2017 07:00  --------------------------------------------------------  IN:    IV PiggyBack: 100 mL    Oral Fluid: 770 mL    Solution: 200 mL  Total IN: 1070 mL    OUT:    Voided: 1300 mL  Total OUT: 1300 mL    Total NET: -230 mL      16 Sep 2017 07:01  -  17 Sep 2017 06:32  --------------------------------------------------------  IN:    Oral Fluid: 480 mL  Total IN: 480 mL    OUT:    Voided: 300 mL  Total OUT: 300 mL    Total NET: 180 mL               Discussed with:     Cultures:	        Radiology

## 2017-09-18 ENCOUNTER — TRANSCRIPTION ENCOUNTER (OUTPATIENT)
Age: 72
End: 2017-09-18

## 2017-09-18 DIAGNOSIS — R19.7 DIARRHEA, UNSPECIFIED: ICD-10-CM

## 2017-09-18 LAB
ANION GAP SERPL CALC-SCNC: 5 MMOL/L — SIGNIFICANT CHANGE UP (ref 5–17)
BUN SERPL-MCNC: 28 MG/DL — HIGH (ref 7–23)
CALCIUM SERPL-MCNC: 8.3 MG/DL — LOW (ref 8.5–10.1)
CHLORIDE SERPL-SCNC: 100 MMOL/L — SIGNIFICANT CHANGE UP (ref 96–108)
CO2 SERPL-SCNC: 36 MMOL/L — HIGH (ref 22–31)
CREAT SERPL-MCNC: 0.36 MG/DL — LOW (ref 0.5–1.3)
GLUCOSE SERPL-MCNC: 219 MG/DL — HIGH (ref 70–99)
HCT VFR BLD CALC: 40.6 % — SIGNIFICANT CHANGE UP (ref 34.5–45)
HGB BLD-MCNC: 12.9 G/DL — SIGNIFICANT CHANGE UP (ref 11.5–15.5)
MCHC RBC-ENTMCNC: 31.3 PG — SIGNIFICANT CHANGE UP (ref 27–34)
MCHC RBC-ENTMCNC: 31.7 GM/DL — LOW (ref 32–36)
MCV RBC AUTO: 98.9 FL — SIGNIFICANT CHANGE UP (ref 80–100)
PLATELET # BLD AUTO: 265 K/UL — SIGNIFICANT CHANGE UP (ref 150–400)
POTASSIUM SERPL-MCNC: 4.6 MMOL/L — SIGNIFICANT CHANGE UP (ref 3.5–5.3)
POTASSIUM SERPL-SCNC: 4.6 MMOL/L — SIGNIFICANT CHANGE UP (ref 3.5–5.3)
RBC # BLD: 4.11 M/UL — SIGNIFICANT CHANGE UP (ref 3.8–5.2)
RBC # FLD: 14.2 % — SIGNIFICANT CHANGE UP (ref 10.3–14.5)
SODIUM SERPL-SCNC: 141 MMOL/L — SIGNIFICANT CHANGE UP (ref 135–145)
WBC # BLD: 10.8 K/UL — HIGH (ref 3.8–10.5)
WBC # FLD AUTO: 10.8 K/UL — HIGH (ref 3.8–10.5)

## 2017-09-18 RX ORDER — NYSTATIN 500MM UNIT
500000 POWDER (EA) MISCELLANEOUS EVERY 6 HOURS
Qty: 0 | Refills: 0 | Status: DISCONTINUED | OUTPATIENT
Start: 2017-09-18 | End: 2017-09-22

## 2017-09-18 RX ADMIN — AZITHROMYCIN 250 MILLIGRAM(S): 500 TABLET, FILM COATED ORAL at 15:03

## 2017-09-18 RX ADMIN — ALBUTEROL 2.5 MILLIGRAM(S): 90 AEROSOL, METERED ORAL at 19:51

## 2017-09-18 RX ADMIN — TADALAFIL 40 MILLIGRAM(S): 10 TABLET, FILM COATED ORAL at 08:42

## 2017-09-18 RX ADMIN — Medication 0.25 MILLIGRAM(S): at 12:58

## 2017-09-18 RX ADMIN — ALBUTEROL 2.5 MILLIGRAM(S): 90 AEROSOL, METERED ORAL at 14:39

## 2017-09-18 RX ADMIN — Medication 325 MILLIGRAM(S): at 12:23

## 2017-09-18 RX ADMIN — OMEPRAZOLE 40 MILLIGRAM(S): 10 CAPSULE, DELAYED RELEASE ORAL at 12:23

## 2017-09-18 RX ADMIN — ALBUTEROL 2.5 MILLIGRAM(S): 90 AEROSOL, METERED ORAL at 07:35

## 2017-09-18 RX ADMIN — Medication 20 MILLIGRAM(S): at 08:42

## 2017-09-18 RX ADMIN — Medication 20 MILLIGRAM(S): at 17:25

## 2017-09-18 RX ADMIN — ENOXAPARIN SODIUM 40 MILLIGRAM(S): 100 INJECTION SUBCUTANEOUS at 12:23

## 2017-09-18 RX ADMIN — Medication 600 MILLIGRAM(S): at 17:25

## 2017-09-18 RX ADMIN — Medication 0.5 MILLIGRAM(S): at 07:35

## 2017-09-18 RX ADMIN — NYSTATIN CREAM 1 APPLICATION(S): 100000 CREAM TOPICAL at 17:26

## 2017-09-18 RX ADMIN — SPIRONOLACTONE 25 MILLIGRAM(S): 25 TABLET, FILM COATED ORAL at 08:42

## 2017-09-18 RX ADMIN — Medication 0.5 MILLIGRAM(S): at 19:51

## 2017-09-18 RX ADMIN — ALBUTEROL 2.5 MILLIGRAM(S): 90 AEROSOL, METERED ORAL at 01:14

## 2017-09-18 RX ADMIN — MYCOPHENOLATE MOFETIL 1000 MILLIGRAM(S): 250 CAPSULE ORAL at 08:43

## 2017-09-18 RX ADMIN — SIMVASTATIN 20 MILLIGRAM(S): 20 TABLET, FILM COATED ORAL at 21:32

## 2017-09-18 RX ADMIN — Medication 500000 UNIT(S): at 17:25

## 2017-09-18 RX ADMIN — Medication 600 MILLIGRAM(S): at 08:42

## 2017-09-18 RX ADMIN — MYCOPHENOLATE MOFETIL 1000 MILLIGRAM(S): 250 CAPSULE ORAL at 21:32

## 2017-09-18 NOTE — DISCHARGE NOTE ADULT - MEDICATION SUMMARY - MEDICATIONS TO TAKE
I will START or STAY ON the medications listed below when I get home from the hospital:    predniSONE  -- 14 milligram(s) by mouth once a day  -- Indication: For Chronic interstitial lung disease    budesonide 0.5 mg/2 mL inhalation suspension  --  inhaled   -- Indication: For Chronic interstitial lung disease    Tyvaso 0.6 mg/mL inhalation solution  -- 9 puff(s) inhaled 4 times a day  -- Indication: For Chronic interstitial lung disease    spironolactone 25 mg oral tablet  -- 1 tab(s) by mouth once a day  -- Indication: For Chronic interstitial lung disease    aspirin 325 mg oral tablet  -- 1 tab(s) by mouth once a day  -- Indication: For Heart    simvastatin 20 mg oral tablet  -- 1 tab(s) by mouth once a day (at bedtime)  -- Indication: For Heart    Xanax 0.25 mg oral tablet  -- 1 tab(s) by mouth 3 times a day, As Needed  -- Indication: For anxiety    Proventil HFA 90 mcg/inh inhalation aerosol  -- 2 puff(s) inhaled 4 times a day  -- Indication: For Chronic interstitial lung disease    CellCept 500 mg oral tablet  -- 2 tab(s) by mouth 2 times a day  -- Indication: For Chronic interstitial lung disease    Bactrim  mg-160 mg oral tablet  -- orally 3 times a week  -- Indication: For Chronic interstitial lung disease    NexIUM 40 mg oral delayed release capsule  -- 1 cap(s) by mouth once a day  -- Indication: For gerd I will START or STAY ON the medications listed below when I get home from the hospital:    predniSONE  -- 14 milligram(s) by mouth once a day  -- Indication: For Chronic interstitial lung disease    budesonide 0.5 mg/2 mL inhalation suspension  --  inhaled   -- Indication: For Chronic interstitial lung disease    Tyvaso 0.6 mg/mL inhalation solution  -- 9 puff(s) inhaled 4 times a day  -- Indication: For Chronic interstitial lung disease    spironolactone 25 mg oral tablet  -- 1 tab(s) by mouth once a day  -- Indication: For Chronic interstitial lung disease    aspirin 325 mg oral tablet  -- 1 tab(s) by mouth once a day  -- Indication: For Heart    simvastatin 20 mg oral tablet  -- 1 tab(s) by mouth once a day (at bedtime)  -- Indication: For Heart    Xanax 0.25 mg oral tablet  -- 1 tab(s) by mouth 3 times a day, As Needed  -- Indication: For anxiety    Proventil HFA 90 mcg/inh inhalation aerosol  -- 2 puff(s) inhaled 4 times a day  -- Indication: For Chronic interstitial lung disease    CellCept 500 mg oral tablet  -- 2 tab(s) by mouth 2 times a day  -- Indication: For Chronic interstitial lung disease    azithromycin 250 mg oral tablet  -- orally 3 times a week  -- Indication: For Chronic interstitial lung disease    Bactrim  mg-160 mg oral tablet  -- orally 3 times a week  -- Indication: For Chronic interstitial lung disease    NexIUM 40 mg oral delayed release capsule  -- 1 cap(s) by mouth once a day  -- Indication: For gerd I will START or STAY ON the medications listed below when I get home from the hospital:    budesonide 0.5 mg/2 mL inhalation suspension  --  inhaled   -- Indication: For Chronic interstitial lung disease    predniSONE 20 mg oral tablet  -- 1 tab(s) by mouth once a day  -- Indication: For Chronic interstitial lung disease    Tyvaso 0.6 mg/mL inhalation solution  -- 9 puff(s) inhaled 4 times a day  -- Indication: For Chronic interstitial lung disease    spironolactone 25 mg oral tablet  -- 1 tab(s) by mouth once a day  -- Indication: For Chronic interstitial lung disease    aspirin 325 mg oral tablet  -- 1 tab(s) by mouth once a day  -- Indication: For Heart    simvastatin 20 mg oral tablet  -- 1 tab(s) by mouth once a day (at bedtime)  -- Indication: For Heart    Xanax 0.25 mg oral tablet  -- 1 tab(s) by mouth 3 times a day, As Needed  -- Indication: For anxiety    Proventil HFA 90 mcg/inh inhalation aerosol  -- 2 puff(s) inhaled 4 times a day  -- Indication: For Chronic interstitial lung disease    CellCept 500 mg oral tablet  -- 2 tab(s) by mouth 2 times a day  -- Indication: For Chronic interstitial lung disease    azithromycin 250 mg oral tablet  -- orally 3 times a week  -- Indication: For Chronic interstitial lung disease    Bactrim  mg-160 mg oral tablet  -- orally 3 times a week  -- Indication: For Chronic interstitial lung disease    NexIUM 40 mg oral delayed release capsule  -- 1 cap(s) by mouth once a day  -- Indication: For gerd I will START or STAY ON the medications listed below when I get home from the hospital:    budesonide 0.5 mg/2 mL inhalation suspension  --  inhaled   -- Indication: For Chronic interstitial lung disease    predniSONE 20 mg oral tablet  -- 1 tab(s) by mouth once a day  -- Indication: For Chronic interstitial lung disease    tadalafil 20 mg oral tablet  -- 2 tab(s) by mouth   -- Indication: For Pulmonary hypertension    Tyvaso 0.6 mg/mL inhalation solution  -- 9 puff(s) inhaled 4 times a day  -- Indication: For Chronic interstitial lung disease    spironolactone 25 mg oral tablet  -- 1 tab(s) by mouth once a day  -- Indication: For Chronic interstitial lung disease    aspirin 325 mg oral tablet  -- 1 tab(s) by mouth once a day  -- Indication: For Heart    simvastatin 20 mg oral tablet  -- 1 tab(s) by mouth once a day (at bedtime)  -- Indication: For Heart    Xanax 0.25 mg oral tablet  -- 1 tab(s) by mouth 3 times a day, As Needed  -- Indication: For anxiety    Proventil HFA 90 mcg/inh inhalation aerosol  -- 2 puff(s) inhaled 4 times a day  -- Indication: For Chronic interstitial lung disease    CellCept 500 mg oral tablet  -- 2 tab(s) by mouth 2 times a day  -- Indication: For Chronic interstitial lung disease    azithromycin 250 mg oral tablet  -- orally 3 times a week  -- Indication: For Chronic interstitial lung disease    Bactrim  mg-160 mg oral tablet  -- orally 3 times a week  -- Indication: For Chronic interstitial lung disease    NexIUM 40 mg oral delayed release capsule  -- 1 cap(s) by mouth once a day  -- Indication: For gerd

## 2017-09-18 NOTE — DISCHARGE NOTE ADULT - PATIENT PORTAL LINK FT
“You can access the [employer] Patient Portal, offered by [facility], by registering with the following website: [email or web address]”

## 2017-09-18 NOTE — DISCHARGE NOTE ADULT - NSTOBACCOSMKCESSPRO_GEN_A_CS
Referred to [facility] for Tobacco Control... Referred to [facility] for Tobacco Control...

## 2017-09-18 NOTE — PROGRESS NOTE ADULT - SUBJECTIVE AND OBJECTIVE BOX
Neurology Follow up note    DAVID [id number]    HPI:  Pt complains of worsening sob since last night requiring more o2 then usual, felt better on 8 liters, in er with bipap, also states had brief episode of ams. pt with significant sob going to icu for further monitoring. no prior hx of cva, has significant hx of pulm htn, all her doctors including pulm at [facility] (12 Sep 2017 17:35)      Interval History - doing better.    Patient is seen, chart was reviewed and case was discussed with the treatment team.  Pt is not in any distress.   Lying on bed comfortably.   No events reported overnight.   No clinical seizure was reported.  Sitting on chair bed comfortably.    is at bedside.    Vital Signs Last 24 Hrs  T(C): 36.5 (18 Sep 2017 11:55), Max: 36.7 (17 Sep 2017 21:15)  T(F): 97.7 (18 Sep 2017 11:55), Max: 98 (17 Sep 2017 21:15)  HR: 94 (18 Sep 2017 11:55) (81 - 110)  BP: 116/61 (18 Sep 2017 11:55) (96/64 - 129/76)  BP(mean): --  RR: 23 (18 Sep 2017 11:55) (19 - 23)  SpO2: 100% (18 Sep 2017 11:55) (91% - 100%)        REVIEW OF SYSTEMS:    Constitutional: No fever, weight loss or fatigue  Eyes: No eye pain, visual disturbances, or discharge  ENT:  No difficulty hearing, tinnitus, vertigo; No sinus or throat pain  Neck: No pain or stiffness.  Cardiovascular: No chest pain, palpitations, shortness of breath, dizziness or leg swelling  Gastrointestinal: No abdominal or epigastric pain. No nausea, vomiting or hematemesis; No diarrhea or constipation. No melena or hematochezia.  Genitourinary: No dysuria, frequency, hematuria or incontinence  Neurological: No headaches, memory loss, loss of strength, numbness or tremors  Psychiatric: No depression, anxiety, mood swings or difficulty sleeping  Musculoskeletal: No joint pain or swelling; No muscle, back or extremity pain  Skin: No itching, burning, rashes or lesions   Lymph Nodes: No enlarged glands  Endocrine: No heat or cold intolerance; No hair loss, No h/o diabetes or thyroid dysfunction  Allergy and Immunologic: No hives or eczema    On Neurological Examination:    Mental Status - Pt is alert, awake, oriented X3.  Follows commands well and able to answer questions appropriately. Mood and affect  normal    Speech -  dysarthria.    Cranial Nerves - Pupils 3 mm equal and reactive to light, extraocular eye movements intact. Pt has right visual field deficit.  Pt has no  facial asymmetry. Facial sensation is intact .Tongue - is in midline.    Muscle tone - atrophy.    Motor Exam - UE 5/5.  LE 3-4/5.    Sensory Exam - . Pt withdraws all extremities equally on stimulation.      coordination:    Finger to nose: normal.      Deep tendon Reflexes - 2 plus all over.        Romberg - Negative.    Neck Supple -  Yes.     MEDICATIONS    buDESOnide   0.5 milliGRAM(s) Respule 0.5 milliGRAM(s) Inhalation two times a day  ALBUTerol    0.083% 2.5 milliGRAM(s) Nebulizer every 6 hours  enoxaparin Injectable 40 milliGRAM(s) SubCutaneous daily  influenza   Vaccine 0.5 milliLiter(s) IntraMuscular once  aspirin 325 milliGRAM(s) Oral daily  spironolactone 25 milliGRAM(s) Oral daily  tyvaso inhalation 7 Puff(s) 7 Puff(s) Inhalation four times a day  simvastatin 20 milliGRAM(s) Oral at bedtime  guaiFENesin  milliGRAM(s) Oral every 12 hours  omeprazole 40 milliGRAM(s) Oral daily  azithromycin   Tablet 250 milliGRAM(s) Oral <User Schedule>  trimethoprim  160 mG/sulfamethoxazole 800 mG 1 Tablet(s) Oral <User Schedule>  mycophenolate mofetil 1000 milliGRAM(s) Oral <User Schedule>  tadalafil 40 milliGRAM(s) Oral <User Schedule>  ALPRAZolam 0.25 milliGRAM(s) Oral three times a day PRN  nystatin Powder 1 Application(s) Topical two times a day  predniSONE   Tablet 20 milliGRAM(s) Oral two times a day      Allergies    No Known Allergies    Intolerances        LABS:  CBC Full  -  ( 18 Sep 2017 06:28 )  WBC Count : 10.8 K/uL  Hemoglobin : 12.9 g/dL  Hematocrit : 40.6 %  Platelet Count - Automated : 265 K/uL  Mean Cell Volume : 98.9 fl  Mean Cell Hemoglobin : 31.3 pg      09-18    141  |  100  |  28<H>  ----------------------------<  219<H>  4.6   |  36<H>  |  0.36<L>    Ca    8.3<L>      18 Sep 2017 06:28  Phos  3.7     09-17  Mg     2.4     09-17      Hemoglobin A1C:     Vitamin B12     RADIOLOGY.  < from: MRI Head w/o Cont (09.15.17 @ 18:24) >    EXAM:  MRA HEAD W O CONTRAST                          EXAM:  MRI BRAIN W O CONTRAST                            PROCEDURE DATE:  09/15/2017          INTERPRETATION:  MRI BRAIN WITHOUT CONTRAST  MRA HEAD WITHOUT CONTRAST    HISTORY: stroke.    COMPARISON: CT head 9/12/2017.    TECHNIQUE:   MRI brain: Multiplanar and multisequential MR imaging of the brain was   performed without Gadavist intravenous contrast.  MRA head: MRA of the [place] without contrast was performed   using time-of-flight technique. 3D/MIP reconstructions were created on a   separate workstation.    FINDINGS:    MRI Brain:  There is an area of diffusion restriction in the left occipital temporal   region with associated T2/FLAIR hyperintensity compatible with   acute/subacute infarct. Minimal foci of petechial hemorrhages is seen in   the left occipital lobe infarct. A tiny focus of diffusion restriction   with associated T2/FLAIR hyperintensity in the right temporal lobe is   also compatible with acute/subacute infarct.    The ventricles and cortical sulci are prominent reflecting parenchymal   volume loss. No intracranial hemorrhage, mass effect or midline shift.   The basal cisterns are patent.    Minimal foci of T2/FLAIR hyperintensity are noted in theperiventricular   white matter, nonspecific but likely sequela of small vessel ischemic   disease.    The paranasal sinuses and mastoid air cells are well aerated.    MRA Head:  The petrous, cavernous, and supraclinoid portions of the internal carotid   arteries appear normal. The A1 and A2 segments of the anterior cerebral   arteries, as well as the anterior communicating artery complex, appear   within normal limits. The M1 segments of the middle cerebral arteries,   including the bifurcations,appear normal. The left posterior   communicating artery is seen.    The intracranial vertebral arteries appear normal. The basilar artery   appears normal. The posterior cerebral and superior cerebellar arteries   arise normally from the basilar artery.    The distal portions of the left posterior cerebral artery in the infarct   bed, the distal P3 and P4 branches are irregular, likely due to stenosis.     There is no aneurysm visualized in the anterior or posterior circulation   of the brain.    MRA findings were confirmed on the 3D reformatted images.    IMPRESSION:  Acute/subacute infarct in the left occipital temporal region, in the left   PCA territory. Minimal petechial hemorrhage in the left occipital lobe.    Tiny acute/subacute infarct in the right temporal lobe.    MRA of the head demonstrates irregularity of the distal left posterior   cerebral artery in the infarct bed, likely due to stenosis.    Findings were discussed with Dr. Harris on 9/15/2017 6:58 PM with readback.                GREYSON FISHER MD., ATTENDING RADIOLOGIST  This document has been electronically signed. Sep 15 2017  6:59PM            ASSESSMENT AND PLAN:      BILATERAL CVA ? EMBOLIC.    SP RESPIRATORY FAILURE.    CONTINUE ASA/STATIN.  Physical therapy evaluation.  OOB to chair/ambulation with assistance only.  Plan of care was discussed with family. Questions answered.  Would continue to follow.

## 2017-09-18 NOTE — DISCHARGE NOTE ADULT - CARE PLAN
Principal Discharge DX:	Chronic interstitial lung disease  Goal:	have no sob  Instructions for follow-up, activity and diet:	continue meds as prescibed  fu with pulmonary  bipap at night  daily day time o2  Secondary Diagnosis:	Pulmonary hypertension  Instructions for follow-up, activity and diet:	continue meds  pulm fu

## 2017-09-18 NOTE — DISCHARGE NOTE ADULT - NS AS DC FOLLOWUP STROKE INST
Stroke (includes: TIA/SAH/ICH/Ischemic Stroke)/Smoking Cessation Smoking Cessation Influenza vaccination (VIS Pub Date: August 19, 2014)/Smoking Cessation

## 2017-09-18 NOTE — DISCHARGE NOTE ADULT - PLAN OF CARE
have no sob continue meds as prescibed  fu with pulmonary  bipap at night  daily day time o2 continue meds  pulm fu

## 2017-09-18 NOTE — DISCHARGE NOTE ADULT - HOSPITAL COURSE
admitted to icu for acute respiratory distress from copd due to restrictive lung disease and pulm htn, did well, now being transferred to [facility] with resp unit. to fu with pcp and pulm as outpt

## 2017-09-18 NOTE — PROGRESS NOTE ADULT - SUBJECTIVE AND OBJECTIVE BOX
Date/Time Patient Seen:  		  Referring MD:   Data Reviewed	       Patient is a 72y old  Female who presents with a chief complaint of SOB x4 days, headache, forgetfulness (12 Sep 2017 19:33)  in bed  seen and examined  vs and meds reviewed  on BIPAP overnight        Subjective/HPI     PAST MEDICAL & SURGICAL HISTORY:  Chronic interstitial lung disease  Pulmonary hypertension  DM (diabetes mellitus): pulmonary htn  History of cholecystectomy  H/O abdominal hysterectomy: 2002        Medication list         MEDICATIONS  (STANDING):  buDESOnide   0.5 milliGRAM(s) Respule 0.5 milliGRAM(s) Inhalation two times a day  ALBUTerol    0.083% 2.5 milliGRAM(s) Nebulizer every 6 hours  enoxaparin Injectable 40 milliGRAM(s) SubCutaneous daily  influenza   Vaccine 0.5 milliLiter(s) IntraMuscular once  aspirin 325 milliGRAM(s) Oral daily  spironolactone 25 milliGRAM(s) Oral daily  tyvaso inhalation 7 Puff(s) 7 Puff(s) Inhalation four times a day  simvastatin 20 milliGRAM(s) Oral at bedtime  guaiFENesin  milliGRAM(s) Oral every 12 hours  omeprazole 40 milliGRAM(s) Oral daily  azithromycin   Tablet 250 milliGRAM(s) Oral <User Schedule>  trimethoprim  160 mG/sulfamethoxazole 800 mG 1 Tablet(s) Oral <User Schedule>  mycophenolate mofetil 1000 milliGRAM(s) Oral <User Schedule>  tadalafil 40 milliGRAM(s) Oral <User Schedule>  nystatin Powder 1 Application(s) Topical two times a day  predniSONE   Tablet 20 milliGRAM(s) Oral two times a day    MEDICATIONS  (PRN):  ALPRAZolam 0.25 milliGRAM(s) Oral three times a day PRN anxiety         Vitals log        ICU Vital Signs Last 24 Hrs  T(C): 36.4 (18 Sep 2017 05:31), Max: 36.7 (17 Sep 2017 21:15)  T(F): 97.6 (18 Sep 2017 05:31), Max: 98 (17 Sep 2017 21:15)  HR: 81 (18 Sep 2017 05:31) (81 - 110)  BP: 96/64 (18 Sep 2017 05:31) (96/64 - 129/76)  BP(mean): --  ABP: --  ABP(mean): --  RR: 19 (18 Sep 2017 05:31) (19 - 26)  SpO2: 99% (18 Sep 2017 05:31) (89% - 99%)           Input and Output:  I&O's Detail    16 Sep 2017 07:01  -  17 Sep 2017 07:00  --------------------------------------------------------  IN:    Oral Fluid: 480 mL  Total IN: 480 mL    OUT:    Voided: 300 mL  Total OUT: 300 mL    Total NET: 180 mL      17 Sep 2017 07:01  -  18 Sep 2017 06:37  --------------------------------------------------------  IN:  Total IN: 0 mL    OUT:    Voided: 200 mL  Total OUT: 200 mL    Total NET: -200 mL          Lab Data                        13.0   7.0   )-----------( 260      ( 17 Sep 2017 08:45 )             41.4     09-17    141  |  99  |  24<H>  ----------------------------<  143<H>  4.2   |  36<H>  |  0.39<L>    Ca    8.6      17 Sep 2017 08:45  Phos  3.7     09-17  Mg     2.4     09-17              Review of Systems	      Objective     Physical Examination  head at  heart - s1s2  lungs - dec BS  abd - soft  kyphosis        Pertinent Lab findings & Imaging      [place]:  NO   Adequate UO     I&O's Detail    16 Sep 2017 07:01  -  17 Sep 2017 07:00  --------------------------------------------------------  IN:    Oral Fluid: 480 mL  Total IN: 480 mL    OUT:    Voided: 300 mL  Total OUT: 300 mL    Total NET: 180 mL      17 Sep 2017 07:01  -  18 Sep 2017 06:37  --------------------------------------------------------  IN:  Total IN: 0 mL    OUT:    Voided: 200 mL  Total OUT: 200 mL    Total NET: -200 mL               Discussed with:     Cultures:	        Radiology

## 2017-09-18 NOTE — DISCHARGE NOTE ADULT - NS AS ACTIVITY OBS
Walking-Outdoors allowed/Do not make important decisions/Do not drive or operate machinery/No Heavy lifting/straining/Walking-Indoors allowed

## 2017-09-18 NOTE — DISCHARGE NOTE ADULT - NS AS DC STROKE ED MATERIALS
Prescribed Medications/Need for Followup After Discharge/Stroke Education Booklet/Advancing age is a risk factor for Strokes please review Stroke Education/Stroke Warning Signs and Symptoms/Call 911 for Stroke/Risk Factors for Stroke Advancing age is a risk factor for Strokes please review Stroke Education

## 2017-09-18 NOTE — PROGRESS NOTE ADULT - SUBJECTIVE AND OBJECTIVE BOX
Patient is a 72y old  Female who presents with a chief complaint of SOB x4 days, headache, forgetfulness (12 Sep 2017 19:33)      INTERVAL HPI/OVERNIGHT EVENTS: agrees to deneen now, complains of diarrhea since yesterday. also had thrush on upper palate    MEDICATIONS  (STANDING):  buDESOnide   0.5 milliGRAM(s) Respule 0.5 milliGRAM(s) Inhalation two times a day  ALBUTerol    0.083% 2.5 milliGRAM(s) Nebulizer every 6 hours  enoxaparin Injectable 40 milliGRAM(s) SubCutaneous daily  influenza   Vaccine 0.5 milliLiter(s) IntraMuscular once  aspirin 325 milliGRAM(s) Oral daily  spironolactone 25 milliGRAM(s) Oral daily  tyvaso inhalation 7 Puff(s) 7 Puff(s) Inhalation four times a day  simvastatin 20 milliGRAM(s) Oral at bedtime  guaiFENesin  milliGRAM(s) Oral every 12 hours  omeprazole 40 milliGRAM(s) Oral daily  azithromycin   Tablet 250 milliGRAM(s) Oral <User Schedule>  trimethoprim  160 mG/sulfamethoxazole 800 mG 1 Tablet(s) Oral <User Schedule>  mycophenolate mofetil 1000 milliGRAM(s) Oral <User Schedule>  tadalafil 40 milliGRAM(s) Oral <User Schedule>  nystatin Powder 1 Application(s) Topical two times a day  predniSONE   Tablet 20 milliGRAM(s) Oral two times a day  nystatin    Suspension 032727 Unit(s) Oral every 6 hours    MEDICATIONS  (PRN):  ALPRAZolam 0.25 milliGRAM(s) Oral three times a day PRN anxiety      Allergies    No Known Allergies    Intolerances        REVIEW OF SYSTEMS:  CONSTITUTIONAL: No fever, weight loss, or fatigue  EYES: No eye pain, visual disturbances  ENMT: sensation in oral cavity upper palate  NECK: No pain or stiffness  RESPIRATORY: No cough, wheezing, chills or hemoptysis; No shortness of breath  CARDIOVASCULAR: No chest pain, palpitations, dizziness  GASTROINTESTINAL:diarrhea  GENITOURINARY: No dysuria, frequency, hematuria, or incontinence  NEUROLOGICAL: No headaches, memory loss, loss of strength, numbness, or tremors  SKIN: No itching, burning  LYMPH NODES: No enlarged glands  MUSCULOSKELETAL: No joint pain or swelling; No muscle, back, or extremity pain  PSYCHIATRIC: No depression, mood swings  HEME/LYMPH: No easy bruising, or bleeding gums  ALLERGY AND IMMUNOLOGIC: No hives    Vital Signs Last 24 Hrs  T(C): 36.5 (18 Sep 2017 11:55), Max: 36.7 (17 Sep 2017 21:15)  T(F): 97.7 (18 Sep 2017 11:55), Max: 98 (17 Sep 2017 21:15)  HR: 94 (18 Sep 2017 11:55) (81 - 110)  BP: 116/61 (18 Sep 2017 11:55) (96/64 - 129/76)  BP(mean): --  RR: 23 (18 Sep 2017 11:55) (19 - 23)  SpO2: 100% (18 Sep 2017 11:55) (91% - 100%)    PHYSICAL EXAM:  GENERAL: NAD, well-groomed, well-developed  HEAD:  Atraumatic, Normocephalic  EYES: EOMI, PERRLA, conjunctiva and sclera clear  ENMT: thrush on upper palate  NECK: Supple, No JVD  NERVOUS SYSTEM:  Alert & Oriented X3, Good concentration  CHEST/LUNG: b/l rhonci  HEART: Regular rate and rhythm  ABDOMEN: Soft, Nontender, Nondistended; Bowel sounds present  EXTREMITIES:  2+ Peripheral Pulses   LYMPH: No lymphadenopathy noted  SKIN: No rashes     LABS:                        12.9   10.8  )-----------( 265      ( 18 Sep 2017 06:28 )             40.6     18 Sep 2017 06:28    141    |  100    |  28     ----------------------------<  219    4.6     |  36     |  0.36     Ca    8.3        18 Sep 2017 06:28          CAPILLARY BLOOD GLUCOSE                RADIOLOGY & ADDITIONAL TESTS:  no new      Consultant(s) Notes Reviewed:  [x ] YES  [ ] NO    Care Discussed with Consultants/Other Providers [x ] YES  [ ] NO    Advanced Care Planning Discussed with Patien/Family [ ] YES  [x ] NO

## 2017-09-18 NOTE — DISCHARGE NOTE ADULT - NSTOBACCOHOTLINE_GEN_A_CS
[facility] Smokers Quitline ([id number]) [facility] Smokers Quitline ([phone]-[phone]) [facility] Smokers Quitline ([phone])

## 2017-09-19 RX ADMIN — ALBUTEROL 2.5 MILLIGRAM(S): 90 AEROSOL, METERED ORAL at 19:22

## 2017-09-19 RX ADMIN — ALBUTEROL 2.5 MILLIGRAM(S): 90 AEROSOL, METERED ORAL at 15:18

## 2017-09-19 RX ADMIN — Medication 20 MILLIGRAM(S): at 07:59

## 2017-09-19 RX ADMIN — NYSTATIN CREAM 1 APPLICATION(S): 100000 CREAM TOPICAL at 17:15

## 2017-09-19 RX ADMIN — TADALAFIL 40 MILLIGRAM(S): 10 TABLET, FILM COATED ORAL at 07:59

## 2017-09-19 RX ADMIN — Medication 500000 UNIT(S): at 01:32

## 2017-09-19 RX ADMIN — Medication 600 MILLIGRAM(S): at 07:59

## 2017-09-19 RX ADMIN — Medication 600 MILLIGRAM(S): at 21:25

## 2017-09-19 RX ADMIN — Medication 0.5 MILLIGRAM(S): at 07:38

## 2017-09-19 RX ADMIN — Medication 500000 UNIT(S): at 17:15

## 2017-09-19 RX ADMIN — ALBUTEROL 2.5 MILLIGRAM(S): 90 AEROSOL, METERED ORAL at 07:37

## 2017-09-19 RX ADMIN — ENOXAPARIN SODIUM 40 MILLIGRAM(S): 100 INJECTION SUBCUTANEOUS at 12:07

## 2017-09-19 RX ADMIN — SPIRONOLACTONE 25 MILLIGRAM(S): 25 TABLET, FILM COATED ORAL at 07:59

## 2017-09-19 RX ADMIN — Medication 0.5 MILLIGRAM(S): at 19:22

## 2017-09-19 RX ADMIN — Medication 20 MILLIGRAM(S): at 17:15

## 2017-09-19 RX ADMIN — MYCOPHENOLATE MOFETIL 1000 MILLIGRAM(S): 250 CAPSULE ORAL at 21:24

## 2017-09-19 RX ADMIN — ALBUTEROL 2.5 MILLIGRAM(S): 90 AEROSOL, METERED ORAL at 00:36

## 2017-09-19 RX ADMIN — OMEPRAZOLE 40 MILLIGRAM(S): 10 CAPSULE, DELAYED RELEASE ORAL at 07:59

## 2017-09-19 RX ADMIN — Medication 1 TABLET(S): at 15:22

## 2017-09-19 RX ADMIN — Medication 500000 UNIT(S): at 12:07

## 2017-09-19 RX ADMIN — Medication 325 MILLIGRAM(S): at 12:07

## 2017-09-19 RX ADMIN — SIMVASTATIN 20 MILLIGRAM(S): 20 TABLET, FILM COATED ORAL at 21:25

## 2017-09-19 RX ADMIN — MYCOPHENOLATE MOFETIL 1000 MILLIGRAM(S): 250 CAPSULE ORAL at 07:59

## 2017-09-19 NOTE — PROGRESS NOTE ADULT - SUBJECTIVE AND OBJECTIVE BOX
Date/Time Patient Seen:  		  Referring MD:   Data Reviewed	       Patient is a 72y old  Female who presents with a chief complaint of SOB x4 days, headache, forgetfulness (18 Sep 2017 14:18)    in bed  seen and examined  vs and meds reviewed  on BIPAP at night time      Subjective/HPI     PAST MEDICAL & SURGICAL HISTORY:  Chronic interstitial lung disease  Pulmonary hypertension  DM (diabetes mellitus): pulmonary htn  History of cholecystectomy  H/O abdominal hysterectomy: 2002        Medication list         MEDICATIONS  (STANDING):  buDESOnide   0.5 milliGRAM(s) Respule 0.5 milliGRAM(s) Inhalation two times a day  ALBUTerol    0.083% 2.5 milliGRAM(s) Nebulizer every 6 hours  enoxaparin Injectable 40 milliGRAM(s) SubCutaneous daily  influenza   Vaccine 0.5 milliLiter(s) IntraMuscular once  aspirin 325 milliGRAM(s) Oral daily  spironolactone 25 milliGRAM(s) Oral daily  tyvaso inhalation 7 Puff(s) 7 Puff(s) Inhalation four times a day  simvastatin 20 milliGRAM(s) Oral at bedtime  guaiFENesin  milliGRAM(s) Oral every 12 hours  omeprazole 40 milliGRAM(s) Oral daily  azithromycin   Tablet 250 milliGRAM(s) Oral <User Schedule>  trimethoprim  160 mG/sulfamethoxazole 800 mG 1 Tablet(s) Oral <User Schedule>  mycophenolate mofetil 1000 milliGRAM(s) Oral <User Schedule>  tadalafil 40 milliGRAM(s) Oral <User Schedule>  nystatin Powder 1 Application(s) Topical two times a day  predniSONE   Tablet 20 milliGRAM(s) Oral two times a day  nystatin    Suspension 193005 Unit(s) Oral every 6 hours    MEDICATIONS  (PRN):  ALPRAZolam 0.25 milliGRAM(s) Oral three times a day PRN anxiety         Vitals log        ICU Vital Signs Last 24 Hrs  T(C): 36.4 (19 Sep 2017 04:05), Max: 36.8 (18 Sep 2017 15:56)  T(F): 97.5 (19 Sep 2017 04:05), Max: 98.2 (18 Sep 2017 15:56)  HR: 89 (19 Sep 2017 04:05) (79 - 115)  BP: 97/68 (19 Sep 2017 04:05) (97/68 - 116/61)  BP(mean): --  ABP: --  ABP(mean): --  RR: 17 (19 Sep 2017 04:05) (17 - 23)  SpO2: 100% (19 Sep 2017 04:05) (93% - 100%)           Input and Output:  I&O's Detail    17 Sep 2017 07:01  -  18 Sep 2017 07:00  --------------------------------------------------------  IN:  Total IN: 0 mL    OUT:    Voided: 200 mL  Total OUT: 200 mL    Total NET: -200 mL      18 Sep 2017 07:01  -  19 Sep 2017 06:49  --------------------------------------------------------  IN:    Oral Fluid: 520 mL  Total IN: 520 mL    OUT:    Voided: 500 mL  Total OUT: 500 mL    Total NET: 20 mL          Lab Data                        12.9   10.8  )-----------( 265      ( 18 Sep 2017 06:28 )             40.6     09-18    141  |  100  |  28<H>  ----------------------------<  219<H>  4.6   |  36<H>  |  0.36<L>    Ca    8.3<L>      18 Sep 2017 06:28  Phos  3.7     09-17  Mg     2.4     09-17              Review of Systems	      Objective     Physical Examination    head at  heart - s1s2  lungs - dec BS  kyphosis        Pertinent Lab findings & Imaging      Francie:  NO   Adequate UO     I&O's Detail    17 Sep 2017 07:01  -  18 Sep 2017 07:00  --------------------------------------------------------  IN:  Total IN: 0 mL    OUT:    Voided: 200 mL  Total OUT: 200 mL    Total NET: -200 mL      18 Sep 2017 07:01  -  19 Sep 2017 06:49  --------------------------------------------------------  IN:    Oral Fluid: 520 mL  Total IN: 520 mL    OUT:    Voided: 500 mL  Total OUT: 500 mL    Total NET: 20 mL               Discussed with:     Cultures:	        Radiology

## 2017-09-19 NOTE — PROGRESS NOTE ADULT - SUBJECTIVE AND OBJECTIVE BOX
Patient is a 72y old  Female who presents with a chief complaint of SOB x4 days, headache, forgetfulness (18 Sep 2017 14:18)      INTERVAL HPI/OVERNIGHT EVENTS: stable, feels well, for dc to deneen    MEDICATIONS  (STANDING):  buDESOnide   0.5 milliGRAM(s) Respule 0.5 milliGRAM(s) Inhalation two times a day  ALBUTerol    0.083% 2.5 milliGRAM(s) Nebulizer every 6 hours  enoxaparin Injectable 40 milliGRAM(s) SubCutaneous daily  influenza   Vaccine 0.5 milliLiter(s) IntraMuscular once  aspirin 325 milliGRAM(s) Oral daily  spironolactone 25 milliGRAM(s) Oral daily  tyvaso inhalation 7 Puff(s) 7 Puff(s) Inhalation four times a day  simvastatin 20 milliGRAM(s) Oral at bedtime  guaiFENesin  milliGRAM(s) Oral every 12 hours  omeprazole 40 milliGRAM(s) Oral daily  azithromycin   Tablet 250 milliGRAM(s) Oral <User Schedule>  trimethoprim  160 mG/sulfamethoxazole 800 mG 1 Tablet(s) Oral <User Schedule>  mycophenolate mofetil 1000 milliGRAM(s) Oral <User Schedule>  tadalafil 40 milliGRAM(s) Oral <User Schedule>  nystatin Powder 1 Application(s) Topical two times a day  predniSONE   Tablet 20 milliGRAM(s) Oral two times a day  nystatin    Suspension 948516 Unit(s) Oral every 6 hours    MEDICATIONS  (PRN):  ALPRAZolam 0.25 milliGRAM(s) Oral three times a day PRN anxiety      Allergies    No Known Allergies    Intolerances        REVIEW OF SYSTEMS:  CONSTITUTIONAL: No fever, weight loss, or fatigue  EYES: No eye pain, visual disturbances  ENMT:  No difficulty hearing, tinnitus, vertigo; No sinus or throat pain  NECK: No pain or stiffness  RESPIRATORY: No cough, wheezing, chills or hemoptysis; No shortness of breath  CARDIOVASCULAR: No chest pain, palpitations, dizziness  GASTROINTESTINAL: No abdominal or epigastric pain. No nausea, vomiting, or hematemesis; No diarrhea or constipation. No melena or hematochezia.  GENITOURINARY: No dysuria, frequency, hematuria, or incontinence  NEUROLOGICAL: No headaches, memory loss, loss of strength, numbness, or tremors  SKIN: No itching, burning  LYMPH NODES: No enlarged glands  MUSCULOSKELETAL: No joint pain or swelling; No muscle, back, or extremity pain  PSYCHIATRIC: No depression, mood swings  HEME/LYMPH: No easy bruising, or bleeding gums  ALLERGY AND IMMUNOLOGIC: No hives    Vital Signs Last 24 Hrs  T(C): 36.4 (19 Sep 2017 07:22), Max: 36.8 (18 Sep 2017 15:56)  T(F): 97.5 (19 Sep 2017 07:22), Max: 98.2 (18 Sep 2017 15:56)  HR: 96 (19 Sep 2017 07:40) (79 - 115)  BP: 132/70 (19 Sep 2017 07:22) (97/68 - 132/70)  BP(mean): --  RR: 85 (19 Sep 2017 07:22) (17 - 85)  SpO2: 99% (19 Sep 2017 07:40) (93% - 100%)    PHYSICAL EXAM:  GENERAL: NAD, well-groomed, well-developed  HEAD:  Atraumatic, Normocephalic  EYES: EOMI, PERRLA, conjunctiva and sclera clear  ENMT: No tonsillar erythema, exudates, or enlargement   NECK: Supple, No JVD  NERVOUS SYSTEM:  Alert & Oriented X3, Good concentration  CHEST/LUNG: Clear to auscultation bilaterally; No rales, rhonchi, wheezing  HEART: Regular rate and rhythm  ABDOMEN: Soft, Nontender, Nondistended; Bowel sounds present  EXTREMITIES:  2+ Peripheral Pulses   LYMPH: No lymphadenopathy noted  SKIN: No rashes     LABS:      Ca    8.3        18 Sep 2017 06:28          CAPILLARY BLOOD GLUCOSE                RADIOLOGY & ADDITIONAL TESTS:  no new      Consultant(s) Notes Reviewed:  x ] YES  [ ] NO    Care Discussed with Consultants/Other Providers [x ] YES  [ ] NO    Advanced Care Planning Discussed with Patien/Family [ ] YES  [x ] NO

## 2017-09-20 RX ORDER — AZITHROMYCIN 500 MG/1
0 TABLET, FILM COATED ORAL
Qty: 0 | Refills: 0 | COMMUNITY

## 2017-09-20 RX ORDER — TADALAFIL 10 MG/1
40 TABLET, FILM COATED ORAL
Qty: 0 | Refills: 0 | COMMUNITY

## 2017-09-20 RX ORDER — AZITHROMYCIN 500 MG/1
0 TABLET, FILM COATED ORAL
Qty: 0 | Refills: 0 | COMMUNITY
Start: 2017-09-20

## 2017-09-20 RX ORDER — SPIRONOLACTONE 25 MG/1
1 TABLET, FILM COATED ORAL
Qty: 0 | Refills: 0 | COMMUNITY
Start: 2017-09-20

## 2017-09-20 RX ORDER — BUDESONIDE, MICRONIZED 100 %
0 POWDER (GRAM) MISCELLANEOUS
Qty: 0 | Refills: 0 | COMMUNITY
Start: 2017-09-20

## 2017-09-20 RX ORDER — ASPIRIN/CALCIUM CARB/MAGNESIUM 324 MG
1 TABLET ORAL
Qty: 0 | Refills: 0 | COMMUNITY
Start: 2017-09-20

## 2017-09-20 RX ORDER — MUPIROCIN 20 MG/G
1 OINTMENT TOPICAL
Qty: 0 | Refills: 0 | COMMUNITY

## 2017-09-20 RX ADMIN — Medication 20 MILLIGRAM(S): at 08:02

## 2017-09-20 RX ADMIN — MYCOPHENOLATE MOFETIL 1000 MILLIGRAM(S): 250 CAPSULE ORAL at 08:04

## 2017-09-20 RX ADMIN — Medication 0.5 MILLIGRAM(S): at 07:35

## 2017-09-20 RX ADMIN — AZITHROMYCIN 250 MILLIGRAM(S): 500 TABLET, FILM COATED ORAL at 15:33

## 2017-09-20 RX ADMIN — Medication 500000 UNIT(S): at 02:38

## 2017-09-20 RX ADMIN — ALBUTEROL 2.5 MILLIGRAM(S): 90 AEROSOL, METERED ORAL at 13:34

## 2017-09-20 RX ADMIN — NYSTATIN CREAM 1 APPLICATION(S): 100000 CREAM TOPICAL at 17:13

## 2017-09-20 RX ADMIN — ALBUTEROL 2.5 MILLIGRAM(S): 90 AEROSOL, METERED ORAL at 01:05

## 2017-09-20 RX ADMIN — ENOXAPARIN SODIUM 40 MILLIGRAM(S): 100 INJECTION SUBCUTANEOUS at 11:15

## 2017-09-20 RX ADMIN — OMEPRAZOLE 40 MILLIGRAM(S): 10 CAPSULE, DELAYED RELEASE ORAL at 08:00

## 2017-09-20 RX ADMIN — TADALAFIL 40 MILLIGRAM(S): 10 TABLET, FILM COATED ORAL at 08:01

## 2017-09-20 RX ADMIN — SIMVASTATIN 20 MILLIGRAM(S): 20 TABLET, FILM COATED ORAL at 20:05

## 2017-09-20 RX ADMIN — SPIRONOLACTONE 25 MILLIGRAM(S): 25 TABLET, FILM COATED ORAL at 08:04

## 2017-09-20 RX ADMIN — Medication 500000 UNIT(S): at 17:13

## 2017-09-20 RX ADMIN — Medication 600 MILLIGRAM(S): at 20:03

## 2017-09-20 RX ADMIN — ALBUTEROL 2.5 MILLIGRAM(S): 90 AEROSOL, METERED ORAL at 07:35

## 2017-09-20 RX ADMIN — Medication 0.25 MILLIGRAM(S): at 11:53

## 2017-09-20 RX ADMIN — MYCOPHENOLATE MOFETIL 1000 MILLIGRAM(S): 250 CAPSULE ORAL at 20:04

## 2017-09-20 RX ADMIN — Medication 0.5 MILLIGRAM(S): at 20:21

## 2017-09-20 RX ADMIN — Medication 325 MILLIGRAM(S): at 11:15

## 2017-09-20 RX ADMIN — ALBUTEROL 2.5 MILLIGRAM(S): 90 AEROSOL, METERED ORAL at 20:20

## 2017-09-20 RX ADMIN — Medication 600 MILLIGRAM(S): at 08:01

## 2017-09-20 RX ADMIN — Medication 500000 UNIT(S): at 11:16

## 2017-09-20 NOTE — PROGRESS NOTE ADULT - SUBJECTIVE AND OBJECTIVE BOX
Patient is a 72y old  Female who presents with a chief complaint of SOB x4 days, headache, forgetfulness (18 Sep 2017 14:18)      INTERVAL HPI/OVERNIGHT EVENTS: stable, await transfer to [facility]    MEDICATIONS  (STANDING):  buDESOnide   0.5 milliGRAM(s) Respule 0.5 milliGRAM(s) Inhalation two times a day  ALBUTerol    0.083% 2.5 milliGRAM(s) Nebulizer every 6 hours  enoxaparin Injectable 40 milliGRAM(s) SubCutaneous daily  influenza   Vaccine 0.5 milliLiter(s) IntraMuscular once  aspirin 325 milliGRAM(s) Oral daily  spironolactone 25 milliGRAM(s) Oral daily  tyvaso inhalation 7 Puff(s) 7 Puff(s) Inhalation four times a day  simvastatin 20 milliGRAM(s) Oral at bedtime  guaiFENesin  milliGRAM(s) Oral every 12 hours  omeprazole 40 milliGRAM(s) Oral daily  azithromycin   Tablet 250 milliGRAM(s) Oral <User Schedule>  trimethoprim  160 mG/sulfamethoxazole 800 mG 1 Tablet(s) Oral <User Schedule>  mycophenolate mofetil 1000 milliGRAM(s) Oral <User Schedule>  tadalafil 40 milliGRAM(s) Oral <User Schedule>  nystatin Powder 1 Application(s) Topical two times a day  nystatin    Suspension 031507 Unit(s) Oral every 6 hours  predniSONE   Tablet 20 milliGRAM(s) Oral daily    MEDICATIONS  (PRN):  ALPRAZolam 0.25 milliGRAM(s) Oral three times a day PRN anxiety      Allergies    No Known Allergies    Intolerances        REVIEW OF SYSTEMS:  CONSTITUTIONAL: No fever, weight loss, or fatigue  EYES: No eye pain, visual disturbances  ENMT:  No difficulty hearing, tinnitus, vertigo; No sinus or throat pain  NECK: No pain or stiffness  RESPIRATORY: No cough, wheezing, chills or hemoptysis; No shortness of breath  CARDIOVASCULAR: No chest pain, palpitations, dizziness  GASTROINTESTINAL: No abdominal or epigastric pain. No nausea, vomiting, or hematemesis; No diarrhea or constipation. No melena or hematochezia.  GENITOURINARY: No dysuria, frequency, hematuria, or incontinence  NEUROLOGICAL: No headaches, memory loss, loss of strength, numbness, or tremors  SKIN: No itching, burning  LYMPH NODES: No enlarged glands  MUSCULOSKELETAL: No joint pain or swelling; No muscle, back, or extremity pain  PSYCHIATRIC: No depression, mood swings  HEME/LYMPH: No easy bruising, or bleeding gums  ALLERGY AND IMMUNOLOGIC: No hives    Vital Signs Last 24 Hrs  T(C): 36.5 (20 Sep 2017 07:28), Max: 36.9 (19 Sep 2017 20:41)  T(F): 97.7 (20 Sep 2017 07:28), Max: 98.5 (19 Sep 2017 20:41)  HR: 92 (20 Sep 2017 07:40) (70 - 108)  BP: 130/84 (20 Sep 2017 07:28) (98/64 - 144/89)  BP(mean): --  RR: 25 (20 Sep 2017 07:28) (17 - 25)  SpO2: 96% (20 Sep 2017 07:40) (94% - 100%)    PHYSICAL EXAM:  GENERAL: NAD, well-groomed, well-developed  HEAD:  Atraumatic, Normocephalic  EYES: EOMI, PERRLA, conjunctiva and sclera clear  ENMT: No tonsillar erythema, exudates, or enlargement   NECK: Supple, No JVD  NERVOUS SYSTEM:  Alert & Oriented X3, Good concentration  CHEST/LUNG: mile rhonci  HEART: Regular rate and rhythm  ABDOMEN: Soft, Nontender, Nondistended; Bowel sounds present  EXTREMITIES:  2+ Peripheral Pulses   LYMPH: No lymphadenopathy noted  SKIN: No rashes     LABS:              CAPILLARY BLOOD GLUCOSE                RADIOLOGY & ADDITIONAL TESTS:  no new      Consultant(s) Notes Reviewed:  [x ] YES  [ ] NO    Care Discussed with Consultants/Other Providers [ x] YES  [ ] NO    Advanced Care Planning Discussed with Patien/Family [ ] YES  [x ] NO

## 2017-09-20 NOTE — PROGRESS NOTE ADULT - SUBJECTIVE AND OBJECTIVE BOX
Date/Time Patient Seen:  		  Referring MD:   Data Reviewed	       Patient is a 72y old  Female who presents with a chief complaint of SOB x4 days, headache, forgetfulness (18 Sep 2017 14:18)  in bed  seen and examined  vs and meds reviewed  on oxygen and on BIPAP overnight        Subjective/HPI     PAST MEDICAL & SURGICAL HISTORY:  Chronic interstitial lung disease  Pulmonary hypertension  DM (diabetes mellitus): pulmonary htn  History of cholecystectomy  H/O abdominal hysterectomy: 2002        Medication list         MEDICATIONS  (STANDING):  buDESOnide   0.5 milliGRAM(s) Respule 0.5 milliGRAM(s) Inhalation two times a day  ALBUTerol    0.083% 2.5 milliGRAM(s) Nebulizer every 6 hours  enoxaparin Injectable 40 milliGRAM(s) SubCutaneous daily  influenza   Vaccine 0.5 milliLiter(s) IntraMuscular once  aspirin 325 milliGRAM(s) Oral daily  spironolactone 25 milliGRAM(s) Oral daily  tyvaso inhalation 7 Puff(s) 7 Puff(s) Inhalation four times a day  simvastatin 20 milliGRAM(s) Oral at bedtime  guaiFENesin  milliGRAM(s) Oral every 12 hours  omeprazole 40 milliGRAM(s) Oral daily  azithromycin   Tablet 250 milliGRAM(s) Oral <User Schedule>  trimethoprim  160 mG/sulfamethoxazole 800 mG 1 Tablet(s) Oral <User Schedule>  mycophenolate mofetil 1000 milliGRAM(s) Oral <User Schedule>  tadalafil 40 milliGRAM(s) Oral <User Schedule>  nystatin Powder 1 Application(s) Topical two times a day  nystatin    Suspension 856315 Unit(s) Oral every 6 hours  predniSONE   Tablet 20 milliGRAM(s) Oral daily    MEDICATIONS  (PRN):  ALPRAZolam 0.25 milliGRAM(s) Oral three times a day PRN anxiety         Vitals log        ICU Vital Signs Last 24 Hrs  T(C): 36.7 (20 Sep 2017 04:42), Max: 36.9 (19 Sep 2017 20:41)  T(F): 98 (20 Sep 2017 04:42), Max: 98.5 (19 Sep 2017 20:41)  HR: 94 (20 Sep 2017 05:00) (70 - 108)  BP: 98/64 (20 Sep 2017 04:42) (98/64 - 144/89)  BP(mean): --  ABP: --  ABP(mean): --  RR: 18 (20 Sep 2017 04:42) (17 - 85)  SpO2: 100% (20 Sep 2017 05:00) (94% - 100%)           Input and Output:  I&O's Detail    18 Sep 2017 07:01  -  19 Sep 2017 07:00  --------------------------------------------------------  IN:    Oral Fluid: 520 mL  Total IN: 520 mL    OUT:    Voided: 500 mL  Total OUT: 500 mL    Total NET: 20 mL      19 Sep 2017 07:01  -  20 Sep 2017 06:43  --------------------------------------------------------  IN:    Oral Fluid: 220 mL  Total IN: 220 mL    OUT:    Voided: 2 mL  Total OUT: 2 mL    Total NET: 218 mL          Lab Data                  Review of Systems	      Objective     Physical Examination  head at  heart - s1s2  lungs - dec BS  abd - soft  kyphosis      Pertinent Lab findings & Imaging      Francie:  NO   Adequate UO     I&O's Detail    18 Sep 2017 07:01  -  19 Sep 2017 07:00  --------------------------------------------------------  IN:    Oral Fluid: 520 mL  Total IN: 520 mL    OUT:    Voided: 500 mL  Total OUT: 500 mL    Total NET: 20 mL      19 Sep 2017 07:01  -  20 Sep 2017 06:43  --------------------------------------------------------  IN:    Oral Fluid: 220 mL  Total IN: 220 mL    OUT:    Voided: 2 mL  Total OUT: 2 mL    Total NET: 218 mL               Discussed with:     Cultures:	        Radiology

## 2017-09-20 NOTE — PROGRESS NOTE ADULT - SUBJECTIVE AND OBJECTIVE BOX
Neurology Follow up note    DAVID [id number]    HPI:  Pt complains of worsening sob since last night requiring more o2 then usual, felt better on 8 liters, in er with bipap, also states had brief episode of ams. pt with significant sob going to icu for further monitoring. no prior hx of cva, has significant hx of pulm htn, all her doctors including pulm at [facility] (12 Sep 2017 17:35)      Interval History - no new weakness.    Patient is seen, chart was reviewed and case was discussed with the treatment team.  Pt is not in any distress.   Lying on bed comfortably.   No events reported overnight.   No clinical seizure was reported.  Sitting on chair bed comfortably.    is at bedside.    Vital Signs Last 24 Hrs  T(C): 36.5 (20 Sep 2017 07:28), Max: 36.9 (19 Sep 2017 20:41)  T(F): 97.7 (20 Sep 2017 07:28), Max: 98.5 (19 Sep 2017 20:41)  HR: 92 (20 Sep 2017 07:40) (70 - 108)  BP: 130/84 (20 Sep 2017 07:28) (98/64 - 144/89)  BP(mean): --  RR: 25 (20 Sep 2017 07:28) (17 - 25)  SpO2: 96% (20 Sep 2017 07:40) (94% - 100%)        REVIEW OF SYSTEMS:    Constitutional: No fever, weight loss or fatigue  Eyes: No eye pain, visual disturbances, or discharge  ENT:  No difficulty hearing, tinnitus, vertigo; No sinus or throat pain  Neck: No pain or stiffness  Respiratory: No cough,  Cardiovascular: No chest pain, palpitations,  dizziness or leg swelling  Gastrointestinal: No abdominal or epigastric pain. No nausea, vomiting or hematemesis; No diarrhea or constipation. No melena or hematochezia.  Genitourinary: No dysuria, frequency, hematuria or incontinence  Neurological: No headaches, memory loss, loss of strength, numbness or tremors  Psychiatric: No depression, anxiety, mood swings or difficulty sleeping  Musculoskeletal: No joint pain or swelling; No muscle, back or extremity pain  Skin: No itching, burning, rashes or lesions   Lymph Nodes: No enlarged glands  Endocrine: No heat or cold intolerance; No hair loss, No h/o diabetes or thyroid dysfunction  Allergy and Immunologic: No hives or eczema    On Neurological Examination:    Mental Status - Pt is alert, awake, oriented X3. Follows commands well and able to answer questions appropriately .Mood and affect  normal    Speech -  Normal.     Cranial Nerves - Pupils 3 mm equal and reactive to light, extraocular eye movements intact. Pt has right visual field deficit.  Pt has no  facial asymmetry. Facial sensation is intact .Tongue - is in midline.    Motor Exam - 4/5 of UE.  LE 3-4/5.    Sensory Exam - Pt withdraws all extremities equally on stimulation. No asymmetry seen. No complaints of tingling, numbness.      coordination:    Finger to nose: normal.      Deep tendon Reflexes - 2 plus all over.        Romberg - Negative.    Neck Supple -  Yes.     MEDICATIONS    buDESOnide   0.5 milliGRAM(s) Respule 0.5 milliGRAM(s) Inhalation two times a day  ALBUTerol    0.083% 2.5 milliGRAM(s) Nebulizer every 6 hours  enoxaparin Injectable 40 milliGRAM(s) SubCutaneous daily  influenza   Vaccine 0.5 milliLiter(s) IntraMuscular once  aspirin 325 milliGRAM(s) Oral daily  spironolactone 25 milliGRAM(s) Oral daily  tyvaso inhalation 7 Puff(s) 7 Puff(s) Inhalation four times a day  simvastatin 20 milliGRAM(s) Oral at bedtime  guaiFENesin  milliGRAM(s) Oral every 12 hours  omeprazole 40 milliGRAM(s) Oral daily  azithromycin   Tablet 250 milliGRAM(s) Oral <User Schedule>  trimethoprim  160 mG/sulfamethoxazole 800 mG 1 Tablet(s) Oral <User Schedule>  mycophenolate mofetil 1000 milliGRAM(s) Oral <User Schedule>  tadalafil 40 milliGRAM(s) Oral <User Schedule>  ALPRAZolam 0.25 milliGRAM(s) Oral three times a day PRN  nystatin Powder 1 Application(s) Topical two times a day  nystatin    Suspension 844720 Unit(s) Oral every 6 hours  predniSONE   Tablet 20 milliGRAM(s) Oral daily      Allergies    No Known Allergies    Intolerances        LABS:            Hemoglobin A1C:     Vitamin B12     RADIOLOGY.    < from: MRI Head w/o Cont (09.15.17 @ 18:24) >    EXAM:  MRA HEAD W O CONTRAST                          EXAM:  MRI BRAIN W O CONTRAST                            PROCEDURE DATE:  09/15/2017          INTERPRETATION:  MRI BRAIN WITHOUT CONTRAST  MRA HEAD WITHOUT CONTRAST    HISTORY: stroke.    COMPARISON: CT head 9/12/2017.    TECHNIQUE:   MRI brain: Multiplanar and multisequential MR imaging of the brain was   performed without Gadavist intravenous contrast.  MRA head: MRA of the [place] without contrast was performed   using time-of-flight technique. 3D/MIP reconstructions were created on a   separate workstation.    FINDINGS:    MRI Brain:  There is an area of diffusion restriction in the left occipital temporal   region with associated T2/FLAIR hyperintensity compatible with   acute/subacute infarct. Minimal foci of petechial hemorrhages is seen in   the left occipital lobe infarct. A tiny focus of diffusion restriction   with associated T2/FLAIR hyperintensity in the right temporal lobe is   also compatible with acute/subacute infarct.    The ventricles and cortical sulci are prominent reflecting parenchymal   volume loss. No intracranial hemorrhage, mass effect or midline shift.   The basal cisterns are patent.    Minimal foci of T2/FLAIR hyperintensity are noted in theperiventricular   white matter, nonspecific but likely sequela of small vessel ischemic   disease.    The paranasal sinuses and mastoid air cells are well aerated.    MRA Head:  The petrous, cavernous, and supraclinoid portions of the internal carotid   arteries appear normal. The A1 and A2 segments of the anterior cerebral   arteries, as well as the anterior communicating artery complex, appear   within normal limits. The M1 segments of the middle cerebral arteries,   including the bifurcations,appear normal. The left posterior   communicating artery is seen.    The intracranial vertebral arteries appear normal. The basilar artery   appears normal. The posterior cerebral and superior cerebellar arteries   arise normally from the basilar artery.    The distal portions of the left posterior cerebral artery in the infarct   bed, the distal P3 and P4 branches are irregular, likely due to stenosis.     There is no aneurysm visualized in the anterior or posterior circulation   of the brain.    MRA findings were confirmed on the 3D reformatted images.    IMPRESSION:  Acute/subacute infarct in the left occipital temporal region, in the left   PCA territory. Minimal petechial hemorrhage in the left occipital lobe.    Tiny acute/subacute infarct in the right temporal lobe.    MRA of the head demonstrates irregularity of the distal left posterior   cerebral artery in the infarct bed, likely due to stenosis.    Findings were discussed with Dr. Harris on 9/15/2017 6:58 PM with readback.                GREYSON FISHER MD., ATTENDING RADIOLOGIST  This document has been electronically signed. Sep 15 2017  6:59PM            ASSESSMENT AND PLAN:        LEFT PCA INFARCT LEADING RIGHT HHA.  SMALL RIGHT TEMPORAL INFARCT,  SP RESPIRATORY FAILURE.    CONTINUE ASA/STATIN.  NEURO WISE STABLE FOR REHAB.  Physical therapy evaluation.  OOB to chair/ambulation with assistance only.  Plan of care was discussed with family. Questions answered.  Would continue to follow.

## 2017-09-21 RX ADMIN — OMEPRAZOLE 40 MILLIGRAM(S): 10 CAPSULE, DELAYED RELEASE ORAL at 08:01

## 2017-09-21 RX ADMIN — TADALAFIL 40 MILLIGRAM(S): 10 TABLET, FILM COATED ORAL at 08:01

## 2017-09-21 RX ADMIN — Medication 500000 UNIT(S): at 17:27

## 2017-09-21 RX ADMIN — ENOXAPARIN SODIUM 40 MILLIGRAM(S): 100 INJECTION SUBCUTANEOUS at 11:24

## 2017-09-21 RX ADMIN — MYCOPHENOLATE MOFETIL 1000 MILLIGRAM(S): 250 CAPSULE ORAL at 08:09

## 2017-09-21 RX ADMIN — Medication 500000 UNIT(S): at 08:00

## 2017-09-21 RX ADMIN — Medication 325 MILLIGRAM(S): at 11:24

## 2017-09-21 RX ADMIN — SPIRONOLACTONE 25 MILLIGRAM(S): 25 TABLET, FILM COATED ORAL at 08:01

## 2017-09-21 RX ADMIN — Medication 0.5 MILLIGRAM(S): at 07:34

## 2017-09-21 RX ADMIN — ALBUTEROL 2.5 MILLIGRAM(S): 90 AEROSOL, METERED ORAL at 20:13

## 2017-09-21 RX ADMIN — ALBUTEROL 2.5 MILLIGRAM(S): 90 AEROSOL, METERED ORAL at 07:35

## 2017-09-21 RX ADMIN — MYCOPHENOLATE MOFETIL 1000 MILLIGRAM(S): 250 CAPSULE ORAL at 20:20

## 2017-09-21 RX ADMIN — Medication 500000 UNIT(S): at 11:24

## 2017-09-21 RX ADMIN — Medication 20 MILLIGRAM(S): at 08:01

## 2017-09-21 RX ADMIN — Medication 0.5 MILLIGRAM(S): at 20:13

## 2017-09-21 RX ADMIN — Medication 1 TABLET(S): at 13:27

## 2017-09-21 RX ADMIN — ALBUTEROL 2.5 MILLIGRAM(S): 90 AEROSOL, METERED ORAL at 13:58

## 2017-09-21 RX ADMIN — SIMVASTATIN 20 MILLIGRAM(S): 20 TABLET, FILM COATED ORAL at 20:20

## 2017-09-21 RX ADMIN — Medication 600 MILLIGRAM(S): at 20:20

## 2017-09-21 RX ADMIN — ALBUTEROL 2.5 MILLIGRAM(S): 90 AEROSOL, METERED ORAL at 01:50

## 2017-09-21 RX ADMIN — NYSTATIN CREAM 1 APPLICATION(S): 100000 CREAM TOPICAL at 06:10

## 2017-09-21 RX ADMIN — Medication 500000 UNIT(S): at 00:35

## 2017-09-21 RX ADMIN — Medication 600 MILLIGRAM(S): at 08:01

## 2017-09-21 RX ADMIN — NYSTATIN CREAM 1 APPLICATION(S): 100000 CREAM TOPICAL at 17:26

## 2017-09-21 NOTE — PROGRESS NOTE ADULT - SUBJECTIVE AND OBJECTIVE BOX
Patient is a 72y old  Female who presents with a chief complaint of SOB x4 days, headache, forgetfulness (18 Sep 2017 14:18)      INTERVAL HPI/OVERNIGHT EVENTS: stable, await transfer to [facility]    MEDICATIONS  (STANDING):  buDESOnide   0.5 milliGRAM(s) Respule 0.5 milliGRAM(s) Inhalation two times a day  ALBUTerol    0.083% 2.5 milliGRAM(s) Nebulizer every 6 hours  enoxaparin Injectable 40 milliGRAM(s) SubCutaneous daily  influenza   Vaccine 0.5 milliLiter(s) IntraMuscular once  aspirin 325 milliGRAM(s) Oral daily  spironolactone 25 milliGRAM(s) Oral daily  tyvaso inhalation 7 Puff(s) 7 Puff(s) Inhalation four times a day  simvastatin 20 milliGRAM(s) Oral at bedtime  guaiFENesin  milliGRAM(s) Oral every 12 hours  omeprazole 40 milliGRAM(s) Oral daily  azithromycin   Tablet 250 milliGRAM(s) Oral <User Schedule>  trimethoprim  160 mG/sulfamethoxazole 800 mG 1 Tablet(s) Oral <User Schedule>  mycophenolate mofetil 1000 milliGRAM(s) Oral <User Schedule>  tadalafil 40 milliGRAM(s) Oral <User Schedule>  nystatin Powder 1 Application(s) Topical two times a day  nystatin    Suspension 449702 Unit(s) Oral every 6 hours  predniSONE   Tablet 20 milliGRAM(s) Oral daily    MEDICATIONS  (PRN):  ALPRAZolam 0.25 milliGRAM(s) Oral three times a day PRN anxiety      Allergies    No Known Allergies    Intolerances        REVIEW OF SYSTEMS:  CONSTITUTIONAL: No fever, weight loss, or fatigue  EYES: No eye pain, visual disturbances  ENMT:  No difficulty hearing, tinnitus, vertigo; No sinus or throat pain  NECK: No pain or stiffness  RESPIRATORY: No cough, wheezing, chills or hemoptysis; No shortness of breath  CARDIOVASCULAR: No chest pain, palpitations, dizziness  GASTROINTESTINAL: No abdominal or epigastric pain. No nausea, vomiting, or hematemesis; No diarrhea or constipation. No melena or hematochezia.  GENITOURINARY: No dysuria, frequency, hematuria, or incontinence  NEUROLOGICAL: No headaches, memory loss, loss of strength, numbness, or tremors  SKIN: No itching, burning  LYMPH NODES: No enlarged glands  MUSCULOSKELETAL: No joint pain or swelling; No muscle, back, or extremity pain  PSYCHIATRIC: No depression, mood swings  HEME/LYMPH: No easy bruising, or bleeding gums  ALLERGY AND IMMUNOLOGIC: No hives    Vital Signs Last 24 Hrs  T(C): 36.5 (20 Sep 2017 07:28), Max: 36.9 (19 Sep 2017 20:41)  T(F): 97.7 (20 Sep 2017 07:28), Max: 98.5 (19 Sep 2017 20:41)  HR: 92 (20 Sep 2017 07:40) (70 - 108)  BP: 130/84 (20 Sep 2017 07:28) (98/64 - 144/89)  BP(mean): --  RR: 25 (20 Sep 2017 07:28) (17 - 25)  SpO2: 96% (20 Sep 2017 07:40) (94% - 100%)    PHYSICAL EXAM:  GENERAL: NAD, well-groomed, well-developed  HEAD:  Atraumatic, Normocephalic  EYES: EOMI, PERRLA, conjunctiva and sclera clear  ENMT: No tonsillar erythema, exudates, or enlargement   NECK: Supple, No JVD  NERVOUS SYSTEM:  Alert & Oriented X3, Good concentration  CHEST/LUNG: mile rhonci  HEART: Regular rate and rhythm  ABDOMEN: Soft, Nontender, Nondistended; Bowel sounds present  EXTREMITIES:  2+ Peripheral Pulses   LYMPH: No lymphadenopathy noted  SKIN: No rashes     LABS:              CAPILLARY BLOOD GLUCOSE                RADIOLOGY & ADDITIONAL TESTS:  no new      Consultant(s) Notes Reviewed:  [x ] YES  [ ] NO    Care Discussed with Consultants/Other Providers [ x] YES  [ ] NO    Advanced Care Planning Discussed with Patien/Family [ ] YES  [x ] NO

## 2017-09-21 NOTE — PROGRESS NOTE ADULT - SUBJECTIVE AND OBJECTIVE BOX
Progress Note:   · Provider Specialty	Neurology	      · Subjective and Objective: 	  Neurology Follow up note    DAVID [id number]    HPI:  Pt complains of worsening sob since last night requiring more o2 then usual, felt better on 8 liters, in er with bipap, also states had brief episode of ams. pt with significant sob going to icu for further monitoring. no prior hx of cva, has significant hx of pulm htn, all her doctors including pulm at [facility] (12 Sep 2017 17:35)      Interval History - no new events.    Patient is seen, chart was reviewed and case was discussed with the treatment team.  Pt is not in any distress.   Lying on bed comfortably.   No events reported overnight.   No clinical seizure was reported.  Sitting on chair bed comfortably.    is at bedside.    Vital Signs Last 24 Hrs  T(C): 36.8 (21 Sep 2017 07:25), Max: 37 (20 Sep 2017 15:55)  T(F): 98.3 (21 Sep 2017 07:25), Max: 98.6 (20 Sep 2017 15:55)  HR: 85 (21 Sep 2017 07:40) (79 - 109)  BP: 115/73 (21 Sep 2017 07:25) (88/55 - 127/80)  BP(mean): --  RR: 24 (21 Sep 2017 07:25) (18 - 24)  SpO2: 95% (21 Sep 2017 07:40) (90% - 99%)        REVIEW OF SYSTEMS:    Constitutional: No fever, weight loss or fatigue  Eyes: No eye pain, visual disturbances, or discharge  ENT:  No difficulty hearing, tinnitus, vertigo; No sinus or throat pain  Neck: No pain or stiffness  Respiratory: No cough,  Cardiovascular: No chest pain, palpitations,  dizziness or leg swelling  Gastrointestinal: No abdominal or epigastric pain. No nausea, vomiting or hematemesis; No diarrhea or constipation. No melena or hematochezia.  Genitourinary: No dysuria, frequency, hematuria or incontinence  Neurological: No headaches, memory loss, loss of strength, numbness or tremors  Psychiatric: No depression, anxiety, mood swings or difficulty sleeping  Musculoskeletal: No joint pain or swelling; No muscle, back or extremity pain  Skin: No itching, burning, rashes or lesions   Lymph Nodes: No enlarged glands  Endocrine: No heat or cold intolerance; No hair loss, No h/o diabetes or thyroid dysfunction  Allergy and Immunologic: No hives or eczema    On Neurological Examination:    Mental Status - Pt is alert, awake, oriented X3. Follows commands well and able to answer questions appropriately .Mood and affect  normal    Speech -  Normal.     Cranial Nerves - Pupils 3 mm equal and reactive to light, extraocular eye movements intact. Pt has right visual field deficit.  Pt has no  facial asymmetry. Facial sensation is intact .Tongue - is in midline.    Motor Exam - 4/5 of UE.  LE 3-4/5.    Sensory Exam - Pt withdraws all extremities equally on stimulation. No asymmetry seen. No complaints of tingling, numbness.      coordination:    Finger to nose: normal.      Deep tendon Reflexes - 2 plus all over.        Romberg - Negative.    Neck Supple -  Yes.     MEDICATIONS    buDESOnide   0.5 milliGRAM(s) Respule 0.5 milliGRAM(s) Inhalation two times a day  ALBUTerol    0.083% 2.5 milliGRAM(s) Nebulizer every 6 hours  enoxaparin Injectable 40 milliGRAM(s) SubCutaneous daily  influenza   Vaccine 0.5 milliLiter(s) IntraMuscular once  aspirin 325 milliGRAM(s) Oral daily  spironolactone 25 milliGRAM(s) Oral daily  tyvaso inhalation 7 Puff(s) 7 Puff(s) Inhalation four times a day  simvastatin 20 milliGRAM(s) Oral at bedtime  guaiFENesin  milliGRAM(s) Oral every 12 hours  omeprazole 40 milliGRAM(s) Oral daily  azithromycin   Tablet 250 milliGRAM(s) Oral <User Schedule>  trimethoprim  160 mG/sulfamethoxazole 800 mG 1 Tablet(s) Oral <User Schedule>  mycophenolate mofetil 1000 milliGRAM(s) Oral <User Schedule>  tadalafil 40 milliGRAM(s) Oral <User Schedule>  ALPRAZolam 0.25 milliGRAM(s) Oral three times a day PRN  nystatin Powder 1 Application(s) Topical two times a day  nystatin    Suspension 747724 Unit(s) Oral every 6 hours  predniSONE   Tablet 20 milliGRAM(s) Oral daily      Allergies    No Known Allergies    Intolerances        LABS:            Hemoglobin A1C:     Vitamin B12     RADIOLOGY.    < from: MRI Head w/o Cont (09.15.17 @ 18:24) >    EXAM:  MRA HEAD W O CONTRAST                          EXAM:  MRI BRAIN W O CONTRAST                            PROCEDURE DATE:  09/15/2017          INTERPRETATION:  MRI BRAIN WITHOUT CONTRAST  MRA HEAD WITHOUT CONTRAST    HISTORY: stroke.    COMPARISON: CT head 9/12/2017.    TECHNIQUE:   MRI brain: Multiplanar and multisequential MR imaging of the brain was   performed without Gadavist intravenous contrast.  MRA head: MRA of the [place] without contrast was performed   using time-of-flight technique. 3D/MIP reconstructions were created on a   separate workstation.    FINDINGS:    MRI Brain:  There is an area of diffusion restriction in the left occipital temporal   region with associated T2/FLAIR hyperintensity compatible with   acute/subacute infarct. Minimal foci of petechial hemorrhages is seen in   the left occipital lobe infarct. A tiny focus of diffusion restriction   with associated T2/FLAIR hyperintensity in the right temporal lobe is   also compatible with acute/subacute infarct.    The ventricles and cortical sulci are prominent reflecting parenchymal   volume loss. No intracranial hemorrhage, mass effect or midline shift.   The basal cisterns are patent.    Minimal foci of T2/FLAIR hyperintensity are noted in theperiventricular   white matter, nonspecific but likely sequela of small vessel ischemic   disease.    The paranasal sinuses and mastoid air cells are well aerated.    MRA Head:  The petrous, cavernous, and supraclinoid portions of the internal carotid   arteries appear normal. The A1 and A2 segments of the anterior cerebral   arteries, as well as the anterior communicating artery complex, appear   within normal limits. The M1 segments of the middle cerebral arteries,   including the bifurcations,appear normal. The left posterior   communicating artery is seen.    The intracranial vertebral arteries appear normal. The basilar artery   appears normal. The posterior cerebral and superior cerebellar arteries   arise normally from the basilar artery.    The distal portions of the left posterior cerebral artery in the infarct   bed, the distal P3 and P4 branches are irregular, likely due to stenosis.     There is no aneurysm visualized in the anterior or posterior circulation   of the brain.    MRA findings were confirmed on the 3D reformatted images.    IMPRESSION:  Acute/subacute infarct in the left occipital temporal region, in the left   PCA territory. Minimal petechial hemorrhage in the left occipital lobe.    Tiny acute/subacute infarct in the right temporal lobe.    MRA of the head demonstrates irregularity of the distal left posterior   cerebral artery in the infarct bed, likely due to stenosis.    Findings were discussed with Dr. Harris on 9/15/2017 6:58 PM with readback.                GREYSON FISHER MD., ATTENDING RADIOLOGIST  This document has been electronically signed. Sep 15 2017  6:59PM            ASSESSMENT AND PLAN:        LEFT PCA INFARCT LEADING RIGHT VISUAL FIELD DEFECTS; DOING WELL  SMALL RIGHT TEMPORAL INFARCT,  SP RESPIRATORY FAILURE.    CONTINUE ASA/STATIN.  NO FURTHER NEURO WORK UP.  NEURO WISE STABLE FOR REHAB.  Physical therapy evaluation.  OOB to chair/ambulation with assistance only.  Plan of care was discussed with family. Questions answered.

## 2017-09-21 NOTE — PROGRESS NOTE ADULT - SUBJECTIVE AND OBJECTIVE BOX
Date/Time Patient Seen:  		  Referring MD:   Data Reviewed	       Patient is a 72y old  Female who presents with a chief complaint of SOB x4 days, headache, forgetfulness (18 Sep 2017 14:18)  in bed  seen and examined  vs and meds reviewed        Subjective/HPI     PAST MEDICAL & SURGICAL HISTORY:  Chronic interstitial lung disease  Pulmonary hypertension  DM (diabetes mellitus): pulmonary htn  History of cholecystectomy  H/O abdominal hysterectomy: 2002        Medication list         MEDICATIONS  (STANDING):  buDESOnide   0.5 milliGRAM(s) Respule 0.5 milliGRAM(s) Inhalation two times a day  ALBUTerol    0.083% 2.5 milliGRAM(s) Nebulizer every 6 hours  enoxaparin Injectable 40 milliGRAM(s) SubCutaneous daily  influenza   Vaccine 0.5 milliLiter(s) IntraMuscular once  aspirin 325 milliGRAM(s) Oral daily  spironolactone 25 milliGRAM(s) Oral daily  tyvaso inhalation 7 Puff(s) 7 Puff(s) Inhalation four times a day  simvastatin 20 milliGRAM(s) Oral at bedtime  guaiFENesin  milliGRAM(s) Oral every 12 hours  omeprazole 40 milliGRAM(s) Oral daily  azithromycin   Tablet 250 milliGRAM(s) Oral <User Schedule>  trimethoprim  160 mG/sulfamethoxazole 800 mG 1 Tablet(s) Oral <User Schedule>  mycophenolate mofetil 1000 milliGRAM(s) Oral <User Schedule>  tadalafil 40 milliGRAM(s) Oral <User Schedule>  nystatin Powder 1 Application(s) Topical two times a day  nystatin    Suspension 888623 Unit(s) Oral every 6 hours  predniSONE   Tablet 20 milliGRAM(s) Oral daily    MEDICATIONS  (PRN):  ALPRAZolam 0.25 milliGRAM(s) Oral three times a day PRN anxiety         Vitals log        ICU Vital Signs Last 24 Hrs  T(C): 36.7 (21 Sep 2017 04:30), Max: 37 (20 Sep 2017 15:55)  T(F): 98.1 (21 Sep 2017 04:30), Max: 98.6 (20 Sep 2017 15:55)  HR: 94 (21 Sep 2017 04:30) (87 - 109)  BP: 127/80 (21 Sep 2017 04:30) (88/55 - 130/84)  BP(mean): --  ABP: --  ABP(mean): --  RR: 22 (21 Sep 2017 04:30) (18 - 25)  SpO2: 90% (21 Sep 2017 04:30) (90% - 99%)           Input and Output:  I&O's Detail    19 Sep 2017 07:01  -  20 Sep 2017 07:00  --------------------------------------------------------  IN:    Oral Fluid: 220 mL  Total IN: 220 mL    OUT:    Voided: 2 mL  Total OUT: 2 mL    Total NET: 218 mL      20 Sep 2017 07:01  -  21 Sep 2017 06:47  --------------------------------------------------------  IN:    Oral Fluid: 100 mL  Total IN: 100 mL    OUT:    Voided: 400 mL  Total OUT: 400 mL    Total NET: -300 mL          Lab Data                  Review of Systems	      Objective     Physical Examination    on BIPAP overnight  head at  kyphosis  lungs - dec BS  abd - soft      Pertinent Lab findings & Imaging      Francie:  NO   Adequate UO     I&O's Detail    19 Sep 2017 07:01  -  20 Sep 2017 07:00  --------------------------------------------------------  IN:    Oral Fluid: 220 mL  Total IN: 220 mL    OUT:    Voided: 2 mL  Total OUT: 2 mL    Total NET: 218 mL      20 Sep 2017 07:01  -  21 Sep 2017 06:47  --------------------------------------------------------  IN:    Oral Fluid: 100 mL  Total IN: 100 mL    OUT:    Voided: 400 mL  Total OUT: 400 mL    Total NET: -300 mL               Discussed with:     Cultures:	        Radiology

## 2017-09-22 VITALS
RESPIRATION RATE: 20 BRPM | TEMPERATURE: 98 F | SYSTOLIC BLOOD PRESSURE: 112 MMHG | OXYGEN SATURATION: 95 % | DIASTOLIC BLOOD PRESSURE: 62 MMHG | HEART RATE: 100 BPM

## 2017-09-22 RX ORDER — TADALAFIL 10 MG/1
2 TABLET, FILM COATED ORAL
Qty: 0 | Refills: 0 | COMMUNITY
Start: 2017-09-22

## 2017-09-22 RX ADMIN — ALBUTEROL 2.5 MILLIGRAM(S): 90 AEROSOL, METERED ORAL at 01:06

## 2017-09-22 RX ADMIN — Medication 0.5 MILLIGRAM(S): at 07:13

## 2017-09-22 RX ADMIN — MYCOPHENOLATE MOFETIL 1000 MILLIGRAM(S): 250 CAPSULE ORAL at 08:17

## 2017-09-22 RX ADMIN — OMEPRAZOLE 40 MILLIGRAM(S): 10 CAPSULE, DELAYED RELEASE ORAL at 08:17

## 2017-09-22 RX ADMIN — AZITHROMYCIN 250 MILLIGRAM(S): 500 TABLET, FILM COATED ORAL at 14:49

## 2017-09-22 RX ADMIN — Medication 600 MILLIGRAM(S): at 08:31

## 2017-09-22 RX ADMIN — SPIRONOLACTONE 25 MILLIGRAM(S): 25 TABLET, FILM COATED ORAL at 08:30

## 2017-09-22 RX ADMIN — NYSTATIN CREAM 1 APPLICATION(S): 100000 CREAM TOPICAL at 07:05

## 2017-09-22 RX ADMIN — Medication 325 MILLIGRAM(S): at 12:46

## 2017-09-22 RX ADMIN — ENOXAPARIN SODIUM 40 MILLIGRAM(S): 100 INJECTION SUBCUTANEOUS at 12:46

## 2017-09-22 RX ADMIN — Medication 0.25 MILLIGRAM(S): at 14:48

## 2017-09-22 RX ADMIN — ALBUTEROL 2.5 MILLIGRAM(S): 90 AEROSOL, METERED ORAL at 07:13

## 2017-09-22 RX ADMIN — Medication 500000 UNIT(S): at 12:46

## 2017-09-22 RX ADMIN — TADALAFIL 40 MILLIGRAM(S): 10 TABLET, FILM COATED ORAL at 08:16

## 2017-09-22 RX ADMIN — ALBUTEROL 2.5 MILLIGRAM(S): 90 AEROSOL, METERED ORAL at 13:00

## 2017-09-22 RX ADMIN — Medication 20 MILLIGRAM(S): at 08:31

## 2017-09-22 RX ADMIN — Medication 500000 UNIT(S): at 08:31

## 2017-09-22 NOTE — PROGRESS NOTE ADULT - ATTENDING COMMENTS
pt dnr  transfer to floor when ready
72 F PMHx pulmonary fibrosis (due to textile chemicals) on cellcept and prednisone, pulm htn, chronic hypoxemic and hypercapnic respiratory failure (6-8L NC at home) presenting with acute-subacute L PCA infarct and ILD flare v HCAP (?aspiration).     -improving  -continue aspirin  -PT f/u  -continue advirca, tyvaso, spironolactone  -continue cellcept  -wean steroids  -NC O2 during the day, bipap at night  -d/c zosyn after today  -bactrim and azithromycin for ppx  -po diet  -OOB, fall precautions  -stable for remote tele
on tele now
71yo F with advanced pulmonary fibrosis (due to textile chemicals) on cellcept and chronic prednisone, pulm htn, chronic hypoxemic and hypercapnic respiratory failure (6-8L NC at home) presenting with acute-subacute L PCA infarct and ILD flare v HCAP (?aspiration).  Continues to improve.    --acute/subacute L PCA infarct  continue aspirin, statin  reattempt MRI today, with xanax for anxiolysis  --pulm htn, continue home adcirca and tyvaso, aldactone  --ILD flare v HCAP   ILD flare, continue zosyn x 5d, continue IV steroids, continue cellcept  continue bronchodilators, azithromycin, bactrim ppx  nightly BiPAP for chronic hypercapnic respiratory failure  --dysphagia, cleared for dysphagia diet  --renal function stable  --stable for tele
71yo F with advanced pulmonary fibrosis (due to textile chemicals) on cellcept and chronic prednisone, pulm htn, chronic hypoxemic and hypercapnic respiratory failure (6-8L NC at home) presenting with acute-subacute L PCA infarct and ILD flare v HCAP (?aspiration).  Now respiratory status markedly improved.     --acute/subacute L PCA infarct  continue aspirin, statin  permissive htn  f/u echo, carotid dopplars  MRI attempted today but became hypoxic lying flat.  ??can be done on BiPAP  --pulm htn, continue home adcirca and tyvaso, aldactone  --ILD flare v HCAP   ILD flare, d/c vanc, continue zosyn, decrease IV steroids, continue cellcept  continue bronchodilators, azithromycin, bactrim ppx  nightly BiPAP for chronic hypercapnic respiratory failure  --dysphagia, cleared for dysphagia diet  --renal function stable
71yo F with advanced pulmonary fibrosis (due to textile chemicals) on cellcept and chronic prednisone, pulm htn, chronic hypoxemic and hypercapnic respiratory failure (6-8L NC at home) presenting with acute-subacute L PCA infarct, acute on chronic hypoxemic respiratory failure due to ILD flare v HCAP (?aspiration) v progression of pulm fibrosis.  Now respiratory status markedly improved.     --acute/subacute L PCA infarct  continue aspirin, start statin  permissive htn  f/u check echo, carotid dopplars  MRI when more stable from respiratory standpoint  speech/PT eval today  --pulm htn, continue home adcirca and tyvaso   appears euvolemic, cotinue home aldactone  --acute on chronic hypoxemic respiratory failure from ILD flare v HCAP (though less likely given rapid improvement)  today markedly improved, though still requiring VM at times   ILD flare, empiric vanc, zosyn, IV steroids, restart cellcept  f/u sputum cx and BCx  continue bronchodilators, azithromycin, bactrim ppx  unlikely PCP since was on bactrim ppx, d/c treatment dose bactrim  --dysphagia, cleared for dysphagia diet, d/c IVF  --renal function stable  --pt critically ill. CC time 45min
dc planning
care per icu team
dc planning
pt dnr
pt dnr  discussed with  and aid at bedside
dc planning

## 2017-09-22 NOTE — PROGRESS NOTE ADULT - PROBLEM SELECTOR PLAN 2
resolved  dc planning
improved today
on cellcept  on ABX proph  pulmicort  cont current PO steroid dose  monitor sat  out of bed  NIPPV  overall better from admission presentation
resolved  dc planning
Cellcept for PF and ILD  NIPPV  o2 support  keep sat > 86 pct  mobilize with PT  for RICO dc plan  HOB elevation   on chronic steroids, leave on current dose, will cont Bactrim and Zithro suppression therapy  dc plan RICO
ILD  pulm fibrosis  on cellcept  cont steroids, taper solumedrol to 40 BID iv for now  monitor sat  increase activity as tolerated  on emp ABX  would de escalate, consider bhaskar Collado reviewed  overall better  am labs better
PF  keep sat > 88 pct  cont current dose of steroids  inhaler and nebs  NIPPV night time and prn day time   prognosis poor  pt is DNR
PF  on steroids  slow taper  on Cellcept  will follow up with Dr. Barajas in [facility]  cont Bronchodilators and Pulmicort  keep sat > 88 pct  mobilize as tolerated  dc planning
cvs regimen  BP control  asa  neuro follow up noted
cvs regimen  diuresis as needed  I and O  BP control  Tyvaso and Adcirca  monitor sat
on steroids  do not taper for now  pt on chronic steroids  cellcept  o2 support  keep sat > 88 pct  dc planning pending  PT as tolerated  on ABX suppression therapy
rest lung disease  I rupesh  Bipap nightly  keep sat > 88 pct
restart Tyvaso and Adcirca - pt's own, as per Home Regimen  pt must have Tyvaso medication inhaled as per home regimen  keep sat > 88 pct  I and O  caution with IVF to prevent volume overload, proBNP is elevated  cont icu supportive care
c diff stool studies sent  isolation   fu cultures  dc planning
improved today
improved today
resolved  dc planning
improved today
improved today  continue pulm regimen
resolved  dc planning

## 2017-09-22 NOTE — PROGRESS NOTE ADULT - PROBLEM SELECTOR PLAN 3
improved today  continue pulm regimen  dc planning
improved today  continue pulm regimen  dc planning
mri noted  on asa  neuro fu  pt eval
asa  neuro follow up noted  MRI report noted  PT  fall prec  dietary discretion
improved today  continue pulm regimen  dc planning
ILD  Pulm Fibrosis  on cellcept  will taper steroids today  bronchodilators  on nebulized steroids as well  keep sat > 88 pct  NIPPV   overall better
Tyvaso and Adcirca  keep sat > 88 pct  NIPPV  I and O  dc planning  tele monitoring
asa  neuro follow up  PT  RICO dc PLAN
asa  neuro follow up  PT  out of bed
asa  neuro following  PT   planned for RICO dc
assess of BIPAP this am  keep 02 on, keep sat > 88 pct  doubt PJP pna, pt is on emp ABX  Bactrim IV has high volume IVPB, caution with Volume overload  will check LDH  however, strongly doubt PJP in this patient  overall better  more alert  will need anxiolysis
cvs regimen  BP control  tyvaso and adcirca  monitor sat  NIPPV and o2 support  keep sat > 88 pct  advanced disease  pt is DNR DNI
cvs regimen  adcirca  tyvaso  diuresis prn  I and O  BP control  cardiac monitoring  pt is DNR  Nippv prn and nightly
tyvaso  adcirca  Bipap at night time  keep sat > 88 pct  o2 support during the day
icu admit  neuro eval called and noted  asa  dopplers pending  echo pending
icu admit  neuro eval called and noted, to have mri  asa  dopplers pending  echo pending
improved today  continue pulm regimen  dc planning
improved today  continue pulm regimen  dc planning
icu admit  neuro eval called and noted, to have mri again today  asa  echo noted
mri noted  on asa  neuro fu  pt eval for dc to deneen

## 2017-09-22 NOTE — PROGRESS NOTE ADULT - NSHPATTENDINGPLANDISCUSS_GEN_ALL_CORE
pt in detail and sw
pt in detail and sw
neuro and icu
pt in detail and sw
pt and pulm and 
pt in detail and sw

## 2017-09-22 NOTE — PROGRESS NOTE ADULT - PROBLEM SELECTOR PROBLEM 2
Dyspnea
Chronic interstitial lung disease
Diarrhea
CVA (cerebral vascular accident)
Chronic interstitial lung disease
Kyphosis
Pulmonary hypertension
Pulmonary hypertension
Diarrhea
Diarrhea
Dyspnea
Diarrhea
Diarrhea

## 2017-09-22 NOTE — PROGRESS NOTE ADULT - PROBLEM SELECTOR PLAN 5
continue home meds  pulm fu
Pulm fibrosis  on cellcept  cont steroids at tapered dose  on EMP ABX regimen  keep sat > 88 pct  transfer out of ICU
continue home meds  pulm fu
continue home meds  pulm fu

## 2017-09-22 NOTE — PROGRESS NOTE ADULT - PROBLEM SELECTOR PLAN 1
improved  continue o2 and bipap at night  pulm noted, start dc planning
restr lung disease  I rupesh  NIPPV  monitor sat  keep sat > 88 pct
ILD  known Dx  chronic lung disease  on Cellcept - restart as per home regimen  o2 support  keep sat > 88 pct  anxiolytics
MRI report reviewed
asprin  statin   tele eval
cvs regimen  BP Control  Tyvaso  Adcirca  Diuresis PRN
rest lung disease  NIPPV  I rupesh  keep sat > 88 pct
restr lung disease  I rupesh  keep sat > 88 pct
restr lung disease  NIPPV  keep sat > 88 pct  I rupesh  out of bed
restr lung disease  keep sat > 88 pct  HOB elevation  NIPPV night time
restrictive Lung disease  NIPPV night time and PRN  monitor sat  o2 support  keep sat > 88 pct
tyvaso and adcirca  keep sat > 88 pct  NIPPV and o2 support  may need diuresis PRN  dc planning
improved  continue o2 and bipap at night  pulm noted, start dc planning
improved  continue o2 and bipap at night  pulm noted, start dc planning
improved today  in icu  on nasal cannuli now  on pulm htn meds, doing better
improved today  in icu  vmask for o2  restart pulm htn meds
improved today  in icu  on nasal cannuli now  on pulm htn meds, doing better
improved  continue o2 and bipap at night  pulm noted  dc planning
improved  continue o2 and bipap at night  pulm noted, start dc planning
improved  continue o2 and bipap at night  pulm and icu fu noted
improved  continue o2 and bipap at night  pulm noted, start dc planning

## 2017-09-22 NOTE — PROGRESS NOTE ADULT - PROBLEM SELECTOR PROBLEM 5
Pulmonary hypertension
Chronic interstitial lung disease
Pulmonary hypertension
Pulmonary hypertension

## 2017-09-22 NOTE — PROGRESS NOTE ADULT - SUBJECTIVE AND OBJECTIVE BOX
Patient is a 72y old  Female who presents with a chief complaint of SOB x4 days, headache, forgetfulness (18 Sep 2017 14:18)      INTERVAL HPI/OVERNIGHT EVENTS: stable, await transfer to [facility]    MEDICATIONS  (STANDING):  buDESOnide   0.5 milliGRAM(s) Respule 0.5 milliGRAM(s) Inhalation two times a day  ALBUTerol    0.083% 2.5 milliGRAM(s) Nebulizer every 6 hours  enoxaparin Injectable 40 milliGRAM(s) SubCutaneous daily  influenza   Vaccine 0.5 milliLiter(s) IntraMuscular once  aspirin 325 milliGRAM(s) Oral daily  spironolactone 25 milliGRAM(s) Oral daily  tyvaso inhalation 7 Puff(s) 7 Puff(s) Inhalation four times a day  simvastatin 20 milliGRAM(s) Oral at bedtime  guaiFENesin  milliGRAM(s) Oral every 12 hours  omeprazole 40 milliGRAM(s) Oral daily  azithromycin   Tablet 250 milliGRAM(s) Oral <User Schedule>  trimethoprim  160 mG/sulfamethoxazole 800 mG 1 Tablet(s) Oral <User Schedule>  mycophenolate mofetil 1000 milliGRAM(s) Oral <User Schedule>  tadalafil 40 milliGRAM(s) Oral <User Schedule>  nystatin Powder 1 Application(s) Topical two times a day  nystatin    Suspension 797677 Unit(s) Oral every 6 hours  predniSONE   Tablet 20 milliGRAM(s) Oral daily    MEDICATIONS  (PRN):  ALPRAZolam 0.25 milliGRAM(s) Oral three times a day PRN anxiety      Allergies    No Known Allergies    Intolerances        REVIEW OF SYSTEMS:  CONSTITUTIONAL: No fever, weight loss, or fatigue  EYES: No eye pain, visual disturbances  ENMT:  No difficulty hearing, tinnitus, vertigo; No sinus or throat pain  NECK: No pain or stiffness  RESPIRATORY: No cough, wheezing, chills or hemoptysis; No shortness of breath  CARDIOVASCULAR: No chest pain, palpitations, dizziness  GASTROINTESTINAL: No abdominal or epigastric pain. No nausea, vomiting, or hematemesis; No diarrhea or constipation. No melena or hematochezia.  GENITOURINARY: No dysuria, frequency, hematuria, or incontinence  NEUROLOGICAL: No headaches, memory loss, loss of strength, numbness, or tremors  SKIN: No itching, burning  LYMPH NODES: No enlarged glands  MUSCULOSKELETAL: No joint pain or swelling; No muscle, back, or extremity pain  PSYCHIATRIC: No depression, mood swings  HEME/LYMPH: No easy bruising, or bleeding gums  ALLERGY AND IMMUNOLOGIC: No hives    Vital Signs Last 24 Hrs  T(C): 36.5 (20 Sep 2017 07:28), Max: 36.9 (19 Sep 2017 20:41)  T(F): 97.7 (20 Sep 2017 07:28), Max: 98.5 (19 Sep 2017 20:41)  HR: 92 (20 Sep 2017 07:40) (70 - 108)  BP: 130/84 (20 Sep 2017 07:28) (98/64 - 144/89)  BP(mean): --  RR: 25 (20 Sep 2017 07:28) (17 - 25)  SpO2: 96% (20 Sep 2017 07:40) (94% - 100%)    PHYSICAL EXAM:  GENERAL: NAD, well-groomed, well-developed  HEAD:  Atraumatic, Normocephalic  EYES: EOMI, PERRLA, conjunctiva and sclera clear  ENMT: No tonsillar erythema, exudates, or enlargement   NECK: Supple, No JVD  NERVOUS SYSTEM:  Alert & Oriented X3, Good concentration  CHEST/LUNG: mile rhonci  HEART: Regular rate and rhythm  ABDOMEN: Soft, Nontender, Nondistended; Bowel sounds present  EXTREMITIES:  2+ Peripheral Pulses   LYMPH: No lymphadenopathy noted  SKIN: No rashes     LABS:              CAPILLARY BLOOD GLUCOSE                RADIOLOGY & ADDITIONAL TESTS:  no new      Consultant(s) Notes Reviewed:  [x ] YES  [ ] NO    Care Discussed with Consultants/Other Providers [ x] YES  [ ] NO    Advanced Care Planning Discussed with Patien/Family [ ] YES  [x ] NO

## 2017-09-22 NOTE — PROGRESS NOTE ADULT - PROBLEM SELECTOR PROBLEM 1
Kyphosis
Respiratory distress
CVA (cerebral vascular accident)
CVA (cerebral vascular accident)
Chronic interstitial lung disease
Kyphosis
Pulmonary hypertension
Pulmonary hypertension
Respiratory distress

## 2017-09-22 NOTE — PROVIDER CONTACT NOTE (OTHER) - ASSESSMENT
pt awake and alert, skin warm and dry, denies any c/o chest pain, dizzyness or lightheadedness.  Pt has dyspnea at rest.

## 2017-09-22 NOTE — PROGRESS NOTE ADULT - PROBLEM SELECTOR PROBLEM 4
Pulmonary hypertension
CVA (cerebral vascular accident)
CVA (cerebral vascular accident)
Pulmonary hypertension
CVA (cerebral vascular accident)
Pulmonary hypertension
CVA (cerebral vascular accident)
Chronic interstitial lung disease
DM (diabetes mellitus)
DM (diabetes mellitus)
Kyphosis
Kyphosis
Pulmonary hypertension
Respiratory distress
CVA (cerebral vascular accident)
CVA (cerebral vascular accident)
Pulmonary hypertension

## 2017-09-22 NOTE — PROGRESS NOTE ADULT - SUBJECTIVE AND OBJECTIVE BOX
Date/Time Patient Seen:  		  Referring MD:   Data Reviewed	       Patient is a 72y old  Female who presents with a chief complaint of SOB x4 days, headache, forgetfulness (18 Sep 2017 14:18)  in bed  seen and examined  vs and meds reviewed      Subjective/HPI     PAST MEDICAL & SURGICAL HISTORY:  Chronic interstitial lung disease  Pulmonary hypertension  DM (diabetes mellitus): pulmonary htn  History of cholecystectomy  H/O abdominal hysterectomy: 2002        Medication list         MEDICATIONS  (STANDING):  buDESOnide   0.5 milliGRAM(s) Respule 0.5 milliGRAM(s) Inhalation two times a day  ALBUTerol    0.083% 2.5 milliGRAM(s) Nebulizer every 6 hours  enoxaparin Injectable 40 milliGRAM(s) SubCutaneous daily  influenza   Vaccine 0.5 milliLiter(s) IntraMuscular once  aspirin 325 milliGRAM(s) Oral daily  spironolactone 25 milliGRAM(s) Oral daily  tyvaso inhalation 7 Puff(s) 7 Puff(s) Inhalation four times a day  simvastatin 20 milliGRAM(s) Oral at bedtime  guaiFENesin  milliGRAM(s) Oral every 12 hours  omeprazole 40 milliGRAM(s) Oral daily  azithromycin   Tablet 250 milliGRAM(s) Oral <User Schedule>  trimethoprim  160 mG/sulfamethoxazole 800 mG 1 Tablet(s) Oral <User Schedule>  mycophenolate mofetil 1000 milliGRAM(s) Oral <User Schedule>  tadalafil 40 milliGRAM(s) Oral <User Schedule>  nystatin Powder 1 Application(s) Topical two times a day  nystatin    Suspension 765746 Unit(s) Oral every 6 hours  predniSONE   Tablet 20 milliGRAM(s) Oral daily    MEDICATIONS  (PRN):  ALPRAZolam 0.25 milliGRAM(s) Oral three times a day PRN anxiety         Vitals log        ICU Vital Signs Last 24 Hrs  T(C): 36.8 (22 Sep 2017 05:37), Max: 36.8 (21 Sep 2017 07:25)  T(F): 98.3 (22 Sep 2017 05:37), Max: 98.3 (21 Sep 2017 07:25)  HR: 95 (22 Sep 2017 05:37) (79 - 110)  BP: 96/60 (22 Sep 2017 05:37) (91/58 - 123/77)  BP(mean): --  ABP: --  ABP(mean): --  RR: 21 (22 Sep 2017 05:37) (19 - 26)  SpO2: 100% (22 Sep 2017 05:37) (95% - 100%)           Input and Output:  I&O's Detail    20 Sep 2017 07:01  -  21 Sep 2017 07:00  --------------------------------------------------------  IN:    Oral Fluid: 100 mL  Total IN: 100 mL    OUT:    Voided: 400 mL  Total OUT: 400 mL    Total NET: -300 mL      21 Sep 2017 07:01  -  22 Sep 2017 06:49  --------------------------------------------------------  IN:    Oral Fluid: 240 mL  Total IN: 240 mL    OUT:  Total OUT: 0 mL    Total NET: 240 mL          Lab Data                  Review of Systems	      Objective     Physical Examination    head at  heart - s1s2  lungs - dec BS  abd - soft      Pertinent Lab findings & Imaging      Francie:  NO   Adequate UO     I&O's Detail    20 Sep 2017 07:01  -  21 Sep 2017 07:00  --------------------------------------------------------  IN:    Oral Fluid: 100 mL  Total IN: 100 mL    OUT:    Voided: 400 mL  Total OUT: 400 mL    Total NET: -300 mL      21 Sep 2017 07:01  -  22 Sep 2017 06:49  --------------------------------------------------------  IN:    Oral Fluid: 240 mL  Total IN: 240 mL    OUT:  Total OUT: 0 mL    Total NET: 240 mL               Discussed with:     Cultures:	        Radiology

## 2017-09-22 NOTE — PROGRESS NOTE ADULT - PROBLEM SELECTOR PLAN 4
continue home meds  pulm fu
mri noted  on asa  neuro fu noted  dc to deneen  pt eval for dc to deneen
mri noted  on asa  neuro fu noted  dc to deneen  pt eval for dc to deneen
continue home meds  management per icu
mri noted  on asa  neuro fu noted  dc to deneen  pt eval for dc to deneen
tyvaso  adcirca  monitor VS and Sat  keep sat > 88 pct  I and O  NIPPV night time and prn during the day  am labs pending
DM care  on high dose steroids  monitor FS  CVA eval pending - MRI  Echo done, report pending  consider transfer out of ICU to tele floor
DM care  on steroids  HgbA1C 7   dietary discretion
asa  neuro follow up  pt   out of bed  monitor sat
cellcept  chronic steroids  suppressive ABX regimen, proph for PJP  monitor sat  keep sat > 88 pct  PT  dc planning  albuterol PRN
cont Adcirca  cont Tyvaso  PT  mobilize  keep sat > 88 pct  BP control  I and O  tele monitoring  dc planning
resolved  o2 support day time  Bipap night time  keep sat > 88 pct  tapering steroids  on emp ABX for Possible LRTI
restr lung disease  NIPPV and o2  keep sat > 88 pct
continue home meds  management per icu
continue home meds  management per icu
mri noted  on asa  neuro fu noted  dc to deneen  pt eval for dc to deneen
mri noted  on asa  neuro fu noted  dc to deneen  pt eval for dc to deneen
continue home meds  management per icu

## 2017-09-22 NOTE — PROGRESS NOTE ADULT - PROVIDER SPECIALTY LIST ADULT
Critical Care
Internal Medicine
Neurology
Pulmonology
Neurology
Internal Medicine
Critical Care
Internal Medicine
Pulmonology
Internal Medicine

## 2017-09-25 DIAGNOSIS — J84.9 INTERSTITIAL PULMONARY DISEASE, UNSPECIFIED: ICD-10-CM

## 2017-09-25 DIAGNOSIS — J84.89 OTHER SPECIFIED INTERSTITIAL PULMONARY DISEASES: ICD-10-CM

## 2017-09-25 DIAGNOSIS — M40.209 UNSPECIFIED KYPHOSIS, SITE UNSPECIFIED: ICD-10-CM

## 2017-09-25 DIAGNOSIS — Z79.82 LONG TERM (CURRENT) USE OF ASPIRIN: ICD-10-CM

## 2017-09-25 DIAGNOSIS — Z28.21 IMMUNIZATION NOT CARRIED OUT BECAUSE OF PATIENT REFUSAL: ICD-10-CM

## 2017-09-25 DIAGNOSIS — J98.4 OTHER DISORDERS OF LUNG: ICD-10-CM

## 2017-09-25 DIAGNOSIS — R13.10 DYSPHAGIA, UNSPECIFIED: ICD-10-CM

## 2017-09-25 DIAGNOSIS — I27.2 OTHER SECONDARY PULMONARY HYPERTENSION: ICD-10-CM

## 2017-09-25 DIAGNOSIS — J68.4 CHRONIC RESPIRATORY CONDITIONS DUE TO CHEMICALS, GASES, FUMES AND VAPORS: ICD-10-CM

## 2017-09-25 DIAGNOSIS — Z90.49 ACQUIRED ABSENCE OF OTHER SPECIFIED PARTS OF DIGESTIVE TRACT: ICD-10-CM

## 2017-09-25 DIAGNOSIS — J96.21 ACUTE AND CHRONIC RESPIRATORY FAILURE WITH HYPOXIA: ICD-10-CM

## 2017-09-25 DIAGNOSIS — Z66 DO NOT RESUSCITATE: ICD-10-CM

## 2017-09-25 DIAGNOSIS — Z90.710 ACQUIRED ABSENCE OF BOTH CERVIX AND UTERUS: ICD-10-CM

## 2017-09-25 DIAGNOSIS — I63.8 OTHER CEREBRAL INFARCTION: ICD-10-CM

## 2017-09-25 DIAGNOSIS — E11.9 TYPE 2 DIABETES MELLITUS WITHOUT COMPLICATIONS: ICD-10-CM

## 2017-09-25 DIAGNOSIS — J96.22 ACUTE AND CHRONIC RESPIRATORY FAILURE WITH HYPERCAPNIA: ICD-10-CM

## 2017-09-25 DIAGNOSIS — Z99.81 DEPENDENCE ON SUPPLEMENTAL OXYGEN: ICD-10-CM

## 2017-10-19 PROCEDURE — 99285 EMERGENCY DEPT VISIT HI MDM: CPT | Mod: 25

## 2017-10-19 PROCEDURE — 71045 X-RAY EXAM CHEST 1 VIEW: CPT

## 2017-10-19 PROCEDURE — 86140 C-REACTIVE PROTEIN: CPT

## 2017-10-19 PROCEDURE — 82803 BLOOD GASES ANY COMBINATION: CPT

## 2017-10-19 PROCEDURE — 97162 PT EVAL MOD COMPLEX 30 MIN: CPT

## 2017-10-19 PROCEDURE — 94640 AIRWAY INHALATION TREATMENT: CPT

## 2017-10-19 PROCEDURE — 94760 N-INVAS EAR/PLS OXIMETRY 1: CPT

## 2017-10-19 PROCEDURE — 97116 GAIT TRAINING THERAPY: CPT

## 2017-10-19 PROCEDURE — 94664 DEMO&/EVAL PT USE INHALER: CPT

## 2017-10-19 PROCEDURE — 80061 LIPID PANEL: CPT

## 2017-10-19 PROCEDURE — 84145 PROCALCITONIN (PCT): CPT

## 2017-10-19 PROCEDURE — 93005 ELECTROCARDIOGRAM TRACING: CPT

## 2017-10-19 PROCEDURE — 87486 CHLMYD PNEUM DNA AMP PROBE: CPT

## 2017-10-19 PROCEDURE — 87070 CULTURE OTHR SPECIMN AEROBIC: CPT

## 2017-10-19 PROCEDURE — 36415 COLL VENOUS BLD VENIPUNCTURE: CPT

## 2017-10-19 PROCEDURE — 80202 ASSAY OF VANCOMYCIN: CPT

## 2017-10-19 PROCEDURE — 87798 DETECT AGENT NOS DNA AMP: CPT

## 2017-10-19 PROCEDURE — 83036 HEMOGLOBIN GLYCOSYLATED A1C: CPT

## 2017-10-19 PROCEDURE — 85027 COMPLETE CBC AUTOMATED: CPT

## 2017-10-19 PROCEDURE — 93306 TTE W/DOPPLER COMPLETE: CPT

## 2017-10-19 PROCEDURE — 96367 TX/PROPH/DG ADDL SEQ IV INF: CPT

## 2017-10-19 PROCEDURE — 97530 THERAPEUTIC ACTIVITIES: CPT

## 2017-10-19 PROCEDURE — 93880 EXTRACRANIAL BILAT STUDY: CPT

## 2017-10-19 PROCEDURE — 87086 URINE CULTURE/COLONY COUNT: CPT

## 2017-10-19 PROCEDURE — 70551 MRI BRAIN STEM W/O DYE: CPT

## 2017-10-19 PROCEDURE — 96376 TX/PRO/DX INJ SAME DRUG ADON: CPT

## 2017-10-19 PROCEDURE — 99221 1ST HOSP IP/OBS SF/LOW 40: CPT

## 2017-10-19 PROCEDURE — 84100 ASSAY OF PHOSPHORUS: CPT

## 2017-10-19 PROCEDURE — 97110 THERAPEUTIC EXERCISES: CPT

## 2017-10-19 PROCEDURE — 87581 M.PNEUMON DNA AMP PROBE: CPT

## 2017-10-19 PROCEDURE — 70450 CT HEAD/BRAIN W/O DYE: CPT

## 2017-10-19 PROCEDURE — 96375 TX/PRO/DX INJ NEW DRUG ADDON: CPT

## 2017-10-19 PROCEDURE — 36600 WITHDRAWAL OF ARTERIAL BLOOD: CPT

## 2017-10-19 PROCEDURE — 80053 COMPREHEN METABOLIC PANEL: CPT

## 2017-10-19 PROCEDURE — 96365 THER/PROPH/DIAG IV INF INIT: CPT

## 2017-10-19 PROCEDURE — 81001 URINALYSIS AUTO W/SCOPE: CPT

## 2017-10-19 PROCEDURE — 96372 THER/PROPH/DIAG INJ SC/IM: CPT | Mod: 59

## 2017-10-19 PROCEDURE — 83615 LACTATE (LD) (LDH) ENZYME: CPT

## 2017-10-19 PROCEDURE — 83735 ASSAY OF MAGNESIUM: CPT

## 2017-10-19 PROCEDURE — 87040 BLOOD CULTURE FOR BACTERIA: CPT

## 2017-10-19 PROCEDURE — 87186 SC STD MICRODIL/AGAR DIL: CPT

## 2017-10-19 PROCEDURE — 80048 BASIC METABOLIC PNL TOTAL CA: CPT

## 2017-10-19 PROCEDURE — 83880 ASSAY OF NATRIURETIC PEPTIDE: CPT

## 2017-10-19 PROCEDURE — 70544 MR ANGIOGRAPHY HEAD W/O DYE: CPT

## 2017-10-19 PROCEDURE — 83605 ASSAY OF LACTIC ACID: CPT

## 2017-10-19 PROCEDURE — 87633 RESP VIRUS 12-25 TARGETS: CPT

## 2017-10-19 PROCEDURE — 94660 CPAP INITIATION&MGMT: CPT

## 2017-12-05 ENCOUNTER — EMERGENCY (EMERGENCY)
Facility: HOSPITAL | Age: 72
LOS: 1 days | Discharge: HOME | End: 2017-12-05
Attending: EMERGENCY MEDICINE | Admitting: EMERGENCY MEDICINE
Payer: COMMERCIAL

## 2017-12-05 VITALS — OXYGEN SATURATION: 98 %

## 2017-12-05 VITALS — HEIGHT: 65 IN | WEIGHT: 128.09 LBS

## 2017-12-05 DIAGNOSIS — Z90.49 ACQUIRED ABSENCE OF OTHER SPECIFIED PARTS OF DIGESTIVE TRACT: Chronic | ICD-10-CM

## 2017-12-05 DIAGNOSIS — Z90.710 ACQUIRED ABSENCE OF BOTH CERVIX AND UTERUS: Chronic | ICD-10-CM

## 2017-12-05 PROCEDURE — 99283 EMERGENCY DEPT VISIT LOW MDM: CPT

## 2017-12-05 RX ORDER — SODIUM CHLORIDE 9 MG/ML
3 INJECTION INTRAMUSCULAR; INTRAVENOUS; SUBCUTANEOUS ONCE
Qty: 0 | Refills: 0 | Status: COMPLETED | OUTPATIENT
Start: 2017-12-05 | End: 2017-12-05

## 2017-12-05 RX ORDER — IPRATROPIUM/ALBUTEROL SULFATE 18-103MCG
3 AEROSOL WITH ADAPTER (GRAM) INHALATION ONCE
Qty: 0 | Refills: 0 | Status: COMPLETED | OUTPATIENT
Start: 2017-12-05 | End: 2017-12-05

## 2017-12-05 NOTE — ED PROVIDER NOTE - CARE PLAN
Principal Discharge DX:	Chronic interstitial lung disease  Secondary Diagnosis:	Pulmonary hypertension  Secondary Diagnosis:	Shortness of breath

## 2017-12-05 NOTE — ED PROVIDER NOTE - OBJECTIVE STATEMENT
Pt is a 71 yo female who presents to the ED with a cc of difficulty breathing.  PMHx of ILD, pulmonary HTN, anxiety, DM, HTN.  Pt was recently admitted to the hospital from 9/12-9/22 for SOB, headache, and increased forgetfulness.  Per hospital notes she was stabilized and then discharged to [facility].  Per pt and  she has only been home for the last 3-4 days.  Pt further states that she is on chronic high flow oxygen at 7 1/2 to 8 liters. For the last 24 hours pt reports that she has been suffering from increased nasal congestion and it is becoming more difficult for her to breath.  Pt confirms that she uses saline nasal spray and topical ointment to help prevent her nose from drying out but that her oxygen is not humidified.  She does report intermittent self contained nasal bleeds not currently suffering from at this time.  She called the ambulance because she is becoming more frustrated in light of her nasal congestion and wants to be seen by ENT.  She wants then do preform a sinus wash out.  Pt had requested to Pt is a 71 yo female who presents to the ED with a cc of difficulty breathing.  PMHx of ILD, pulmonary HTN, anxiety, DM, HTN.  Pt was recently admitted to the hospital from 9/12-9/22 for SOB, headache, and increased forgetfulness.  Per hospital notes she was stabilized and then discharged to [facility].  Per pt and  she has only been home for the last 3-4 days.  Pt further states that she is on chronic high flow oxygen at 7 1/2 to 8 liters. For the last 24 hours pt reports that she has been suffering from increased nasal congestion and it is becoming more difficult for her to breath.  Pt confirms that she uses saline nasal spray and topical ointment to help prevent her nose from drying out but that her oxygen is not humidified.  She does report intermittent self contained nasal bleeds not currently suffering from at this time.  She called the ambulance because she is becoming more frustrated in light of her nasal congestion and wants to be seen by ENT.  She wants them do preform a sinus wash out.  Pt had requested to go to [place] but EMS refused.  On arrival she is refusing to be seen by ED and wants to be transferred Pt is a 73 yo female who presents to the ED with a cc of difficulty breathing.  PMHx of ILD, pulmonary HTN, anxiety, DM, HTN.  ECHO 9/17: EF 50% Normal left ventricular size and systolic function. Dilated left atrium. Paradoxical septal motion. Dilated right ventricle. Dilated right atrium. Mitral annular calcification with mild regurgitation. Severe tricuspid regurgitation. The pulmonary hypertension. Estimated 90 mmHg.  Pt was recently admitted to the hospital from 9/12-9/22 for SOB, headache, and increased forgetfulness.  Per hospital notes she was stabilized and then discharged to [facility].  Per pt and  she has only been home for the last 3-4 days.  Pt further states that she is on chronic high flow oxygen at 7 1/2 to 8 liters. For the last 24 hours pt reports that she has been suffering from increased nasal congestion and it is becoming more difficult for her to breath.  Pt confirms that she uses saline nasal spray and topical ointment to help prevent her nose from drying out but that her oxygen is not humidified.  She does report intermittent self contained nasal bleeds not currently suffering from at this time.  She called the ambulance because she is becoming more frustrated in light of her nasal congestion and wants to be seen by ENT.  She wants them do preform a sinus wash out.  Pt had requested to go to [place] but EMS refused.  On arrival she is refusing to be seen by ED and wants to be transferred

## 2017-12-05 NOTE — ED PROVIDER NOTE - PROGRESS NOTE DETAILS
Detailed conversation had with pt at this time.  Pt initially refusing to be seen at all but then relented to medical exam.  Only compliant at this time is nasal congestion and she is demanding that ENT be called in to see her.  Refusing all additional work up.  At length conversation had with pt regarding the fact that ENT is on call for acute emergencies and although distressing for her this can wait until tomorrow morning to be addressed because she is maintaining her oxygen level at her normal high flow rate.  Family at bedside encouraging pt to be worked up but refusing.  Understands risks.  Still requesting discharge.  Will call for ambulance and arrange for AMA Had an at length discussion with patient.  Patient wishes to leave at this time.  The patient understands that they are leaving against medical advice despite the risk of missing a potentially serious diagnosis which may lead to injury, disability and/or death.  I discussed with the patient which tests would need to be performed and what type of monitoring would be necessary for the patient as well.  I was unable to convince patient to stay for further workup.  The patient was given an opportunity to ask any questions as well.   The patient demonstrates understanding of all risks, is awake and alert and demonstrates competance to make medical decisions.  The patient understands that they may return at any time if desired. Discussed the importance of close, prompt medical follow-up.  Also present at bedside for discussions:  and daughter Pt with SPO2 % on her normal high flow oxygen.  Stable, EMS present, still requesting to leave AMA

## 2018-01-17 ENCOUNTER — EMERGENCY (EMERGENCY)
Facility: HOSPITAL | Age: 73
LOS: 1 days | Discharge: HOME | End: 2018-01-17
Attending: EMERGENCY MEDICINE | Admitting: EMERGENCY MEDICINE
Payer: COMMERCIAL

## 2018-01-17 VITALS
DIASTOLIC BLOOD PRESSURE: 83 MMHG | SYSTOLIC BLOOD PRESSURE: 142 MMHG | WEIGHT: 100.09 LBS | HEART RATE: 84 BPM | RESPIRATION RATE: 40 BRPM | OXYGEN SATURATION: 55 %

## 2018-01-17 DIAGNOSIS — Z90.49 ACQUIRED ABSENCE OF OTHER SPECIFIED PARTS OF DIGESTIVE TRACT: Chronic | ICD-10-CM

## 2018-01-17 DIAGNOSIS — Z90.710 ACQUIRED ABSENCE OF BOTH CERVIX AND UTERUS: Chronic | ICD-10-CM

## 2018-01-17 PROCEDURE — 94640 AIRWAY INHALATION TREATMENT: CPT

## 2018-01-17 PROCEDURE — 99285 EMERGENCY DEPT VISIT HI MDM: CPT

## 2018-01-17 PROCEDURE — 99283 EMERGENCY DEPT VISIT LOW MDM: CPT | Mod: 25

## 2018-01-17 PROCEDURE — 96372 THER/PROPH/DIAG INJ SC/IM: CPT

## 2018-01-17 RX ORDER — SCOPALAMINE 1 MG/3D
1 PATCH, EXTENDED RELEASE TRANSDERMAL ONCE
Qty: 0 | Refills: 0 | Status: COMPLETED | OUTPATIENT
Start: 2018-01-17 | End: 2018-01-17

## 2018-01-17 RX ORDER — SPIRONOLACTONE 25 MG/1
25 TABLET, FILM COATED ORAL ONCE
Qty: 0 | Refills: 0 | Status: COMPLETED | OUTPATIENT
Start: 2018-01-17 | End: 2018-01-17

## 2018-01-17 RX ORDER — MORPHINE SULFATE 50 MG/1
5 CAPSULE, EXTENDED RELEASE ORAL EVERY 4 HOURS
Qty: 0 | Refills: 0 | Status: DISCONTINUED | OUTPATIENT
Start: 2018-01-17 | End: 2018-01-17

## 2018-01-17 RX ORDER — ALBUTEROL 90 UG/1
2.5 AEROSOL, METERED ORAL ONCE
Qty: 0 | Refills: 0 | Status: COMPLETED | OUTPATIENT
Start: 2018-01-17 | End: 2018-01-17

## 2018-01-17 RX ORDER — MORPHINE SULFATE 50 MG/1
10 CAPSULE, EXTENDED RELEASE ORAL EVERY 4 HOURS
Qty: 0 | Refills: 0 | Status: DISCONTINUED | OUTPATIENT
Start: 2018-01-17 | End: 2018-01-17

## 2018-01-17 RX ORDER — ALPRAZOLAM 0.25 MG
0.25 TABLET ORAL EVERY 8 HOURS
Qty: 0 | Refills: 0 | Status: DISCONTINUED | OUTPATIENT
Start: 2018-01-17 | End: 2018-01-17

## 2018-01-17 RX ORDER — MORPHINE SULFATE 50 MG/1
4 CAPSULE, EXTENDED RELEASE ORAL ONCE
Qty: 0 | Refills: 0 | Status: DISCONTINUED | OUTPATIENT
Start: 2018-01-17 | End: 2018-01-17

## 2018-01-17 RX ORDER — SPIRONOLACTONE 25 MG/1
20 TABLET, FILM COATED ORAL ONCE
Qty: 0 | Refills: 0 | Status: DISCONTINUED | OUTPATIENT
Start: 2018-01-17 | End: 2018-01-17

## 2018-01-17 RX ADMIN — MORPHINE SULFATE 10 MILLIGRAM(S): 50 CAPSULE, EXTENDED RELEASE ORAL at 23:38

## 2018-01-17 RX ADMIN — SPIRONOLACTONE 25 MILLIGRAM(S): 25 TABLET, FILM COATED ORAL at 23:38

## 2018-01-17 RX ADMIN — Medication 1 MILLIGRAM(S): at 20:52

## 2018-01-17 RX ADMIN — SCOPALAMINE 1 PATCH: 1 PATCH, EXTENDED RELEASE TRANSDERMAL at 20:52

## 2018-01-17 RX ADMIN — ALBUTEROL 2.5 MILLIGRAM(S): 90 AEROSOL, METERED ORAL at 23:30

## 2018-01-17 NOTE — ED PROVIDER NOTE - PROGRESS NOTE DETAILS
Contact Kacey (Daughter) [phone]   Contact Laney from the Hospice network at [phone] in the morning.  She is going to coordinate placement at the Inpatient hospice facility.  A bed will be available in the AM. Chau Gomez DO [facility] will be [facility] up at 1030am

## 2018-01-17 NOTE — ED ADULT NURSE NOTE - OBJECTIVE STATEMENT
72 year old female brought in by EMS from home for difficulty breathing. Family bedside. Patient lethargic and unable to communicate upon arrival. Patient not following commands. As per family patient is on at home hospice. Tonight they were concerned because the patient began having difficulty breathing. When checking the pulse ox at home, daughter reports low O2 sat. On arrival to ED patient is tachypneic, using accessory muscles and with 55% on nasal canula. With placement on nonrebreather mask during triage patient O2 sat returning to 99%. As per family, no blood draws or invasive testing. Patient with DNR/DNI wishes. No acute signs of pain but patient appears anxious due to difficulty breathing. Patient recently started on 5mg morphine solution PRN q4 hours. As per family decision, they feel it is best the patient remains in the ED overnight until social work and hospice can develop a plan of care. Comfort care measures only.

## 2018-01-17 NOTE — ED PROVIDER NOTE - OBJECTIVE STATEMENT
73 y/o female with h/o end stage interstitial lung disease on Hospice through [facility] for respiratory distress.  Pt was given liquid Morphine orally at home PTA.  Family confirms that they don't want aggressive treatment or lab eval.  They would like to keep pt comfortable.  Pt unable to provide HPI

## 2018-01-18 VITALS
TEMPERATURE: 97 F | DIASTOLIC BLOOD PRESSURE: 54 MMHG | SYSTOLIC BLOOD PRESSURE: 107 MMHG | OXYGEN SATURATION: 105 % | HEART RATE: 105 BPM | RESPIRATION RATE: 35 BRPM

## 2018-01-18 NOTE — ED ADULT NURSE REASSESSMENT NOTE - NS ED NURSE REASSESS COMMENT FT1
Patient transferred from ED stretcher to hospital bed for improved comfort care while being held in ED. Patient continues waiting for bed assignment. Safety maintained.
[facility] to  patient in ED at 1030. Plan and transport arranged by Laney Kaur RN with hospice team. Report given to Laney at this time.
Pt received in report alert and lethargic and resting in bed. Pt maintained on nonrebreather and tolerated well. Pt denies any pain or discomfort as of this time. Nursing care ongoing and safety maintained. Pt awaiting transfer to [facility] at 1030 am. Nursing care ongoing and safety maintained.

## 2018-10-02 NOTE — ED ADULT NURSE NOTE - ISOLATION TYPE:
Impression: Other vitreous opacities, bilateral: H43.393. Condition: established, stable. Plan: Discussed signs and symptoms of PVD/floaters. Discussed signs and symptoms of retinal detachment. No treatment is required at this time. Will continue to observe condition and or symptoms. None

## 2018-11-20 NOTE — ED ADULT NURSE NOTE - NS ED NURSE DISCH DISPOSITION
Discussion/Summary   HI Mrs Luis, Unremarkable protein test.   Dr beebe        Verified Results  Protein Electrophoresis with Reflex to Immunofixation, Serum 2018 09:10AM FERNANDA BEEBE     Test Name Result Flag Reference   PROTEIN,TOTAL 8.5 g/dl H 6.4-8.2   PATHOLOGIST INTERPRETATION      SEE PATHOLOGY ELECTROPHORESIS REPORT     ELECTROPHORESIS 2018 09:10AM FERNANDA BEEBE     Test Name Result Flag Reference   ELECTROPHORESIS (Report) O    Name: ANTONIO LUIS         MRN:   [id number]   : 1949           Visit#: [id number]                Pathology Report      Client:  [facility]                 Date Specimen Collected:18     Accession #: [id number]   Date Specimen Received: 18     Requisition #:[id number]   Date Reported:     2018 10:16  Other Lab #: [id number]                        Location: [facility]              Submitting Physician: Fernanda Beebe MD   ______________________________________________________________________________   Interpretation:      SERUM PROTEIN ELECTROPHORESIS:   Increased total protein is nonspecific. Increased alpha-2 region is associated   with an inflammatory response. Increased beta due to increased beta   lipoprotein is associated with a nonfasting state, lipid disorders and hepatic   disease. Polyclonal increase in gamma region is suggestive of collagen   diseases, chronic liver disease, chronic infection, etc.       ______________________________________________________________________________      Iza Hernández MD   ** Electronic Signature (DAVID) 2018 10:16 **         Specimen(s) Submitted:    Serum Protein Electrophoresis Reflex         Fee Codes:    A: [id number]      Performing Lab(Unless otherwise specified):   [facility],[address] [place] [address]. [id number]        Admitted

## 2019-03-01 NOTE — H&P ADULT. - VENOUS THROMBOEMBOLISM AGE
Emergency Department  [address] [place] [id number]  Phone:  [phone]  Fax:  [phone]                                    Kezia Nava   MRN: [id number]    Department:   Emergency Department   Date of Visit:  3/1/2019           After Visit Summary Signature Page    I have received my discharge instructions, and my questions have been answered. I have discussed any challenges I see with this plan with the nurse or doctor.    ..........................................................................................................................................  Patient/Patient Representative Signature      ..........................................................................................................................................  Patient Representative Print Name and Relationship to Patient    ..................................................               ................................................  Date                                   Time    ..........................................................................................................................................  Reviewed by Signature/Title    ...................................................              ..............................................  Date                                               Time          22EPIC Rev 08/18       
60-75 yrs

## 2020-02-13 NOTE — ED PROVIDER NOTE - PSH
Daily Note     Today's date: 2020  Patient name: Diaz Thomas  : 1968  MRN: [id number]  Referring provider: Henrietta Partida PA-C  Dx:   Encounter Diagnosis     ICD-10-CM    1  Right lateral epicondylitis M77 11                   Subjective: " My elbow is still feeling pretty good "       Objective: See treatment diary below      Assessment: Tolerated treatment well  Patient exhibited good technique with therapeutic exercises and would benefit from continued PT      Plan: Continue per plan of care        Precautions: None (RA due )      Manual   2/4   IASTM to right lateral  epicondyle  X 10  min  Performed   x10 min  performed    x10 min performed   x10 min performed GT 3, 5  x10 min performed GT 3, 5                             x10 min  X 10 min  X 10 min  x10 min       Exercise Diary   24   Pronation Supination with Cane 1#   3x10 1#   2x10 1#   3x10 1#   3x10 1#   2x10   Wrist Curl With DB  5# 3x10 5# 3x10 5# 3x10 5#   3x10 5# 3x10   Wrist Extension with DB  5# 3x10 5# 3x10 5# 2x10 5# 3x10  5# 3x10   Wrist Roller  3# x5 passes 3# x3 passes 3# x3 passes 3# x3 passes  3# 5 passes   Neutral Bicep Curl  5# 3x10 5# 3x10  5# 3x10 5# 3x10 5# 3x10   Reverse Bicep Curl  5# 3x10 5# 3x10  5# 3x10 5# 3x10  5# 3x10   Bicep Curl  5# 3x10 5# 3x10  5# 3x10 5# 3x10 5# 3x10   Radial/Ulnar Deviation with DB  5# 3x10 5# 2x10 5# x20 5# 3x10  5# 3x10   TB Wrist Bar         Digiflex          Wrist flexion stretch  30" x3 30" x3 30" x3 30''x3 30" x3   Wrist Extension Stretch   30" x3 30" x3 30" x3 30''x3 30" x3   UBE  80 rpm  x5'min  Fwd/5'retro 90 rpm  x10 min  fwd 90 rpm  x10 min  fwd 90 rpm  x10 min  fwd 90 rpm  x10 min  fwd                                                               Modalities     CP post tx x10 min    CP post Tx x10 min
H/O abdominal hysterectomy  2002

## 2020-06-23 NOTE — PROGRESS NOTE ADULT - SUBJECTIVE AND OBJECTIVE BOX
"Family Medicine Telephone Visit Note               Telephone Visit Consent   Patient was verbally read the following and verbal consent was obtained.    \"Telephone visits are billed at different rates depending on your insurance coverage. During this emergency period, for some insurers they may be billed the same as an in-person visit.  Please reach out to your insurance provider with any questions.  If during the course of the call the physician/provider feels a telephone visit is not appropriate, you will not be charged for this service.\"    Name person giving consent:  Patient   Date verbal consent given:  6/23/2020  Time verbal consent given:  9:03 AM           Chief Complaint   Patient presents with     RECHECK     follow up gout on foot, need medication for the pain     Musculoskeletal Problem     bilateral leg pain                        HPI   Patients name: Srikanth  Appointment start time:  910 am    Back Pain Follow Up    Description:   Location of pain:  Lumbar,  center and hips pain with walking  Character of pain: waxing and waning  Pain radiation: Does not radiate and radiates into the right buttocks  Since last visit, pain is:  improved  New numbness or weakness in legs, not attributed to pain:  no  What therapies have you tried?  Naproxen-hepls  What has worked well?  PT    Intensity: Currently 4/10, mild    History:   Pain interferes with job: N/A still walking ok          Accompanying Signs & Symptoms:  Risk of Fracture:  None  Risk of Cauda Equina:  None  Risk of Infection:  None  Risk of Cancer:  None    Adherence and Exercise  Medication side effects: yes: none  How often is a medication missed?  As needed for pain  Exercise:walking        Current Outpatient Medications   Medication Sig Dispense Refill     albuterol (PROAIR HFA/PROVENTIL HFA/VENTOLIN HFA) 108 (90 BASE) MCG/ACT Inhaler INHALE TWO PUFFS BY MOUTH FOUR TIMES A DAY AS NEEDED 3 Inhaler 3     amLODIPine (NORVASC) 10 MG tablet Take 1 " 72 F with PMHx of DM, Pulm HTN, ILD, who presents with sob admitted for acute respiratory distress secondary to ILD exacerbation, now found to be positive for influenza B.     INTERVAL HPI: Patient states that she still feels short of breath which is her baseline and has a slight cough. Otherwise she feels improved from yesterday. Overnight, patient had an episode of 3 beats of vtach, pvc. She was asymptomatic and denies any chest pain, shortness of breath, palpitations, nausea, vomiting, or dizziness.     REVIEW OF SYSTEMS:  CONSTITUTIONAL: No fever, weight loss, or fatigue  EYES: No eye pain, visual disturbances, or discharge  ENMT:  No difficulty hearing, tinnitus, vertigo; No sinus or throat pain  NECK: No pain or stiffness  BREASTS: No pain, masses, or nipple discharge  RESPIRATORY: + cough, + shortness of breath   CARDIOVASCULAR: No chest pain, palpitations, dizziness, or leg swelling  GASTROINTESTINAL: No abdominal or epigastric pain. No nausea, vomiting, or hematemesis; No diarrhea or constipation. No melena or hematochezia.  GENITOURINARY: No dysuria, frequency, hematuria, or incontinence  NEUROLOGICAL: No headaches, memory loss, loss of strength, numbness, or tremors  SKIN: No itching, burning, rashes, or lesions   MUSCULOSKELETAL: No joint pain or swelling; No muscle, back, or extremity pain  PSYCHIATRIC: + anxiety-intermittently     PHYSICAL EXAM:  T(C): 36.6, Max: 36.7 (04-02 @ 04:50)  HR: 92 (90 - 103)  BP: 110/70 (93/58 - 124/81)  RR: 22 (20 - 23)  SpO2: 93% (91% - 98%)  Wt(kg): --  I&O's Summary    I & Os for current day (as of 02 Apr 2017 10:30)  =============================================  IN: 300 ml / OUT: 0 ml / NET: 300 ml    GENERAL: NAD, well-groomed, well-developed  HEAD:  Atraumatic, Normocephalic  EYES: EOMI, PERRLA, conjunctiva and sclera clear  ENMT: Moist mucous membranes, Good dentition, No lesions  NECK: Supple, No JVD, Normal thyroid  NERVOUS SYSTEM:  Alert & Oriented X3  CHEST/LUNG: b/l ronchi, more prominent in the right upper lobe   HEART: Regular rate and rhythm; No murmurs, rubs, or gallops  ABDOMEN: Soft, Nontender, Nondistended; Bowel sounds present  EXTREMITIES:  2+ Peripheral Pulses, No clubbing, cyanosis, or edema  LYMPH: No lymphadenopathy noted  SKIN: No rashes or lesions  LABS:                        11.8   5.7   )-----------( 264      ( 02 Apr 2017 06:46 )             37.1     02 Apr 2017 06:46    143    |  103    |  17     ----------------------------<  161    4.8     |  35     |  0.54     Ca    8.6        02 Apr 2017 06:46  Mg     2.1       02 Apr 2017 06:46    TPro  6.6    /  Alb  3.4    /  TBili  0.3    /  DBili  x      /  AST  17     /  ALT  21     /  AlkPhos  84     31 Mar 2017 22:07    PT/INR - ( 31 Mar 2017 22:07 )   PT: 13.0 sec;   INR: 1.19 ratio         PTT - ( 31 Mar 2017 22:07 )  PTT:31.8 sec    CAPILLARY BLOOD GLUCOSE  142 (02 Apr 2017 07:56)  116 (01 Apr 2017 21:00)  120 (01 Apr 2017 17:20)  216 (01 Apr 2017 11:27)    ABG - ( 31 Mar 2017 23:39 )  pH: 7.38  /  pCO2: 53    /  pO2: 117   / HCO3: 29    / Base Excess: 5.6   /  SaO2: 98        hgbA1c: 6.4, CRP 1.65, procalcitonin <0.05  RVP + influenza B       04-01 @ 01:51   Blood culture No growth to date.  --  --         Diet: CCD with evening snack     RADIOLOGY & ADDITIONAL TESTS:    Imaging Personally Reviewed:  [y ] YES     Consultant(s) Notes Reviewed:  y    MEDICATIONS  (STANDING):  enoxaparin Injectable 40milliGRAM(s) SubCutaneous every 24 hours  insulin lispro (HumaLOG) corrective regimen sliding scale  SubCutaneous three times a day before meals  dextrose 5%. 1000milliLiter(s) IV Continuous <Continuous>  dextrose 50% Injectable 12.5Gram(s) IV Push once  dextrose 50% Injectable 25Gram(s) IV Push once  dextrose 50% Injectable 25Gram(s) IV Push once  tadalafil 40milliGRAM(s) Oral daily  treprostinil Inhalation 18MICROGram(s) Inhalation four times a day  pantoprazole    Tablet 40milliGRAM(s) Oral before breakfast  azithromycin  IVPB 500milliGRAM(s) IV Intermittent every 24 hours  simvastatin 20milliGRAM(s) Oral at bedtime  predniSONE   Tablet 20milliGRAM(s) Oral two times a day  ALBUTerol    0.083% 2.5milliGRAM(s) Nebulizer every 8 hours  oseltamivir 75milliGRAM(s) Oral two times a day  spironolactone 25milliGRAM(s) Oral daily  amLODIPine   Tablet 2.5milliGRAM(s) Oral daily  mycophenolate mofetil 1000milliGRAM(s) Oral two times a day    MEDICATIONS  (PRN):  dextrose Gel 1Dose(s) Oral once PRN Blood Glucose LESS THAN 70 milliGRAM(s)/deciliter  glucagon  Injectable 1milliGRAM(s) IntraMuscular once PRN Glucose LESS THAN 70 milligrams/deciliter      DVT Prophylaxis:  Subcu Heparin [  ]     LMWH [y ]     Coumadin [ ]    Xaeralto [ ]    Eliquis [ ]   Venodyne pumps [ ]    Discussed with Patient [y ]     Family [ ]          [ ]   RN[ ]      [ ]    Advance Directives: DNR/DNI, discussed with patient tablet (10 mg) by mouth daily 90 tablet 3     aspirin (ASA) 81 MG tablet Take 1 tablet (81 mg) by mouth daily 90 tablet 3     bisacodyl (BISACODYL LAXATIVE) 5 MG EC tablet Take 2 tablets (10mg) as directed. 2 tablet 0     cyclobenzaprine (FLEXERIL) 10 MG tablet Take 1 tablet (10 mg) by mouth nightly as needed for muscle spasms or other (Foot Nerve Pain) 30 tablet 3     docusate sodium (COLACE) 100 MG tablet Take 1 tablet (100 mg) by mouth 2 times daily as needed for constipation 180 tablet 3     dorzolamide-timolol (COSOPT) 2-0.5 % ophthalmic solution Place 1 drop into the right eye twice daily per optometrist 1 Bottle 11     famotidine (PEPCID) 20 MG tablet Take 1 tablet (20 mg) by mouth 2 times daily as needed for heartburn 180 tablet 3     fluticasone-vilanterol (BREO ELLIPTA) 200-25 MCG/INH inhaler Inhale 1 puff into the lungs daily 3 Inhaler 3     furosemide (LASIX) 20 MG tablet Take 1 tablet (20 mg) by mouth daily 90 tablet 3     hydrALAZINE (APRESOLINE) 10 MG tablet Take 1 tablet (10 mg) by mouth 2 times daily 180 tablet 3     lisinopril (ZESTRIL) 40 MG tablet Take 1 tablet (40 mg) by mouth daily 90 tablet 3     metoprolol tartrate (LOPRESSOR) 100 MG tablet Take 0.5 tablets (50 mg) by mouth 2 times daily 90 tablet 3     Multiple Vitamin (TAB-A-MARZENA) TABS Take 1 tablet by mouth daily 90 tablet 3     naproxen (NAPROSYN) 375 MG tablet Take 1 tablet (375 mg) by mouth 2 times daily (with meals) 30 tablet 2     nitroGLYcerin (NITROLINGUAL) 0.4 MG/SPRAY spray For chest pain spray 1 spray under tongue every 5 minutes for 3 doses. If symptoms persist 5 minutes after 1st dose call 911. 4.9 g 3     polyethylene glycol (MIRALAX) packet Miralax powder 8.3 oz bottle (238 gm) as directed 238 g 0     polyvinyl alcohol (LIQUIFILM TEARS) 1.4 % ophthalmic solution Place 1 drop into the right eye as needed for dry eyes 15 mL 3     rosuvastatin (CRESTOR) 20 MG tablet Take 1 tablet (20 mg) by mouth daily 90 tablet 3     sertraline  (ZOLOFT) 50 MG tablet Take 1 tablet (50 mg) by mouth daily 90 tablet 3     Skin Protectants, Misc. (HYDROCERIN) CREA Apply topically 3 times daily as needed for dry skin (Patient not taking: Reported on 5/14/2020) 228 g 3     terazosin (HYTRIN) 2 MG capsule Take 1 capsule (2 mg) by mouth At Bedtime 30 capsule 11     triamcinolone (KENALOG) 0.1 % external ointment Apply topically 2 times daily To toe web areas until cracking heals 30 g 0     umeclidinium (INCRUSE ELLIPTA) 62.5 MCG/INH inhaler Inhale 1 puff into the lungs daily 3 Inhaler 3     vitamin D3 (CHOLECALCIFEROL) 2000 units tablet Take 1 tablet by mouth daily 100 tablet 3     Allergies   Allergen Reactions     Gabapentin      Other reaction(s): Abnormal Behavior  Per pain clinic - fell, also angry/mood changes. Bad enough that family called police.  Other reaction(s): Abnormal Behavior  Per pain clinic - fell, also angry/mood changes. Bad enough that family called police.     Nitroglycerin Other (See Comments)     Pills only - can use spray.  Other reaction(s): Headache  Headache only with the pills, not the spray  Other reaction(s): Headache  uses spray instead  Other reaction(s): Headache  uses spray instead  Annotation: Headache for 2 weeks    Headache only with the pills, not the spray  uses spray instead  Pills only - can use spray.  Pills only - can use spray.  Other reaction(s): Headache  Headache only with the pills, not the spray  Other reaction(s): Headache  uses spray instead    Other reaction(s): Headache  Annotation: Headache for 2 weeks    Headache only with the pills, not the spray  uses spray instead  Pills only - can use spray.  Pills only - can use spray.  Other reaction(s): Headache  Headache only with the pills, not the spray  Other reaction(s): Headache  uses spray instead              Review of Systems:     Constitutional, HEENT, cardiovascular, pulmonary, gi and gu systems are negative, except as otherwise noted.         Physical Exam:  "    There were no vitals taken for this visit.  Estimated body mass index is 28.62 kg/m  as calculated from the following:    Height as of 6/5/20: 1.651 m (5' 5\").    Weight as of 6/5/20: 78 kg (172 lb).    Exam:  Constitutional: healthy, alert and no distress  Psychiatric: mentation appears normal and affect normal/bright    Results from last visit:  Office Visit on 05/14/2020   Component Date Value Ref Range Status     Hemoglobin A1C 05/14/2020 5.7  4.1 - 5.7 % Final     Cholesterol 05/14/2020 130.2  0.0 - 200.0 mg/dL Final     Cholesterol/HDL Ratio 05/14/2020 3.0  0.0 - 5.0 Final     HDL Cholesterol 05/14/2020 43.9  >40.0 mg/dL Final     LDL Cholesterol Calculated 05/14/2020 66  0 - 129 mg/dL Final     Triglycerides 05/14/2020 103.3  0.0 - 150.0 mg/dL Final     VLDL Cholesterol 05/14/2020 20.7  7.0 - 32.0 mg/dL Final     Urea Nitrogen 05/14/2020 23.9* 7.0 - 21.0 mg/dL Final     Calcium 05/14/2020 10.1  8.5 - 10.1 mg/dL Final     Chloride 05/14/2020 109.3  98.0 - 110.0 mmol/L Final     Carbon Dioxide 05/14/2020 30.8  20.0 - 32.0 mmol/L Final     Creatinine 05/14/2020 1.4* 0.7 - 1.3 mg/dL Final     Glucose 05/14/2020 95.7  70.0 - 99.0 mg'dL Final     Potassium 05/14/2020 4.1  3.2 - 4.6 mmol/L Final     Sodium 05/14/2020 144.4* 132.0 - 142.0 mmol/L Final     GFR Estimate 05/14/2020 52.3* >60.0 mL/min/1.7 m2 Final     GFR Estimate If Black 05/14/2020 63.3  >60.0 mL/min/1.7 m2 Final     WBC Urine 05/14/2020 <2  <5 /hpf Final     RBC Urine 05/14/2020 <2  <5 /hpf Final     Epithelial Cells UR 05/14/2020 2-5  0 - 2 /lpf Final     Mucous Urine 05/14/2020 Few  NONE lpf Final     Casts Urine 05/14/2020 Hyaline present  NONE /lpf Final     Crystal Urine 05/14/2020 None  NONE /lpf Final     Bacteria Wet Prep 05/14/2020 Moderate  None Final     Specific Gravity Urine 05/14/2020 1.020  1.005 - 1.030 Final     pH Urine 05/14/2020 5.0  4.5 - 8.0 Final     Leukocyte Esterase UR 05/14/2020 Negative  NEGATIVE Final     Nitrite Urine " 05/14/2020 Negative  NEGATIVE mg/dL Final     Protein UR 05/14/2020 1+* NEGATIVE mg/dL Final     Glucose Urine 05/14/2020 Negative  NEGATIVE Final     Ketones Urine 05/14/2020 Negative  NEGATIVE mg/dL Final     Urobilinogen mg/dL 05/14/2020 0.2 E.U./dL  0.2 E.U./dL E.U./dL Final     Bilirubin UR 05/14/2020 Negative  NEGATIVE mg/dL Final     Blood UR 05/14/2020 Negative  NEGATIVE mg/dL Final           Assessment and Plan   1. Acute idiopathic gout of left foot   mild  - naproxen (NAPROSYN) 375 MG tablet; Take 1 tablet (375 mg) by mouth 2 times daily (with meals)  Dispense: 30 tablet; Refill: 2    2. Hip pain, right and lumbar pain: DJD    - naproxen (NAPROSYN) 375 MG tablet; Take 1 tablet (375 mg) by mouth 2 times daily (with meals)  Dispense: 30 tablet; Refill: 2    Refilled medications that would be required in the next 3 months.     After Visit Information:  Will print and mail AVS      in 4 to 6 weeks  Appointment end time: 925 am  This is a telephone visit that took 20 minutes.      Clinician location:  Corina Pastor MD

## 2021-05-05 NOTE — CONSULT NOTE ADULT - PROBLEM SELECTOR RECOMMENDATION 4
Patient: Dede Dorado Date of Service: 2021   : 1979 MRN: [id number]       SUBJECTIVE:       HISTORY OF PRESENT ILLNESS:  Dede Dorado is a 41 year old female who presents today for   Chief Complaint   Patient presents with   • Neck Pain     At our last visit, we discussed a more regimented stretching and stress reduction routine. We discussed progressive relaxation.     Since our our last OMT session, she had a bad migraine that lasted several days. She had a follow up appt for this and still has abortive medication if she needs it. No headaches since this one episode.    She has been taking breaks during work throughout the day and walking, she hasn't done many stretches. She uses heat frequently and that feels good. She is sleeping ok. She has not scheduled the sleep study yet, but we will give her a copy of the authorized referral.    PAST MEDICAL HISTORY:  Past Medical History:   Diagnosis Date   • Hyperlipidemia    • Personal history of gestational diabetes        PAST SURGICAL HISTORY:  Past Surgical History:   Procedure Laterality Date   • Bunionectomy     •  section, low transverse      times TWO   • Cholecystectomy         FAMILY HISTORY:  Family History   Problem Relation Age of Onset   • Diabetes Mother    • Hypertension Mother    • Hyperlipidemia Mother    • Diabetes Maternal Grandmother    • Diabetes Sister    • Cancer, Colon Maternal Grandfather    • Diabetes Sister        SOCIAL HISTORY:  Social History     Tobacco Use   • Smoking status: Former Smoker     Packs/day: 0.25     Years: 10.00     Pack years: 2.50   • Smokeless tobacco: Never Used   Substance Use Topics   • Alcohol use: Yes     Comment: holidays only   • Drug use: No       MEDICATIONS:  Current Outpatient Medications   Medication Sig   • SUMAtriptan (IMITREX) 50 MG tablet Take 1 tablet by mouth as needed for Migraine. Take 1 tablet by mouth at onset of migraine. May repeat after 2 hours if  needed.   • ibuprofen (MOTRIN) 600 MG tablet Take 1 tablet by mouth every 8 hours as needed for Pain.   • levonorgestrel (MIRENA, 52 MG,) (52 MG) 20 MCG/DAY intrauterine device by Intrauterine route 1 time.     No current facility-administered medications for this visit.       ALLERGIES:  ALLERGIES:  No Known Allergies    Review of Systems   Constitutional: Negative for chills and fever.   Musculoskeletal: Negative for back pain and neck pain.   Neurological: Negative for dizziness, sensory change, loss of consciousness and headaches.         OBJECTIVE:     Visit Vitals  BP (!) 140/84 (BP Location: LUE - Left upper extremity, Patient Position: Sitting, Cuff Size: Regular)   Pulse 76   Temp 98.5 °F (36.9 °C) (Oral)   Resp 18   Ht 5' 1\" (1.549 m)   Wt 83.6 kg (184 lb 4.9 oz)   LMP 04/11/2021   SpO2 96%   BMI 34.82 kg/m²       Physical Exam   Constitutional: She is oriented to person, place, and time and well-developed, well-nourished, and in no distress.   HENT:   Head: Normocephalic and atraumatic.   Eyes: Conjunctivae are normal.   Cardiovascular: Intact distal pulses.   Pulmonary/Chest: Effort normal. No respiratory distress.   Abdominal: Soft. She exhibits no distension. There is no abdominal tenderness.   Musculoskeletal:      Comments: Neck: hypertonicity of paraspinal muscles    Thoracic: hypertonicity of paraspinal muscles, Left shoulder higher than right shoulder    Lumbar: hypertonicity of paraspinal muscles, increased lordosis    Sacrum: decrease of mobility    Pelvis: decrease of mobility   Neurological: She is alert and oriented to person, place, and time.   Skin: Skin is warm and dry.   Psychiatric: Affect normal.           PROCEDURE:       OSTEOPATHIC MANIPULATIVE TREATMENT     Risk and benefits were discussed with the patient.  Verbal consent was obtained prior to the procedure.    Region(s) Treated and Modality Used  ST = Soft tissue MFR = Myofascial Release ME = Muscle Energy ART = Articulation CS =  Counterstrain   HVLA = High Velocity Low Amplitude FPR = Facilitated Positional Release OCF = Osteopathy in the Crainal field BLT = Balanced Ligamentous Still = Still technique     Head: MFR  Cervical: ST, ME  Thoracic: ST, MFR, ME, HVLA  Lumbar: ST, MFR, ART  Sacrum: ART  Pelvis: ME  Upper Extemity: MFR, ART    Post-Procedure  Patient reassessed and had improved range of motion , decreased asymmetry and reduced tenderness.    Patient tolerated procedure well without discomfort or difficulty.    Complications: No        Assessment AND PLAN:     This is a 41 year old year-old female who presents for   Chief Complaint   Patient presents with   • Neck Pain       Problem List Items Addressed This Visit        Nervous    Menstrual migraine without status migrainosus, not intractable     OMT today, see procedure note  Sumatriptan for abortive therapy prn         Tension headache     Continue relaxation technieques  Recommend yoga videos on you tube (\"chair yoga\")  OMT performed today, see procedure note            Respiratory    Snoring - Primary     Will print out referral for patient to call for sleep study              Musculoskeletal    Somatic dysfunction of spine, cervical     OMT performed today with good results           Somatic dysfunction of head region     OMT performed today with good results           Somatic dysfunction of thoracic region     OMT performed today with good results           Somatic dysfunction of lumbar region     OMT performed today with good results           Somatic dysfunction of upper extremity     OMT performed today with good results           Somatic dysfunction of sacral region     OMT performed today with good results           Somatic dysfunction of pelvic region     OMT performed today with good results                   Instructions provided as documented in the AVS.    No follow-ups on file.      Moni Flynn DO     bipap  incentive rupesh  out of bed

## 2021-06-10 NOTE — INPATIENT CERTIFICATION FOR MEDICARE PATIENTS - NS ICMP TWO DAYS INPATIENT
ANTICOAGULATION  MANAGEMENT    Assessment     Today's INR result of 4.8 is Supratherapeutic (goal INR of 2.0-3.0)        Warfarin taken as previously instructed    No new diet changes affecting INR    Interaction between tylenol and nortriptyline  and warfarin may be affecting INR     Continues to tolerate warfarin with no reported s/s of bleeding or thromboembolism     Previous INR was Supratherapeutic    Plan:     Spoke on phone with Sandy regarding INR result and instructed:     Warfarin Dosing Instructions:  Hold warfarin tonight  then change warfarin dose to 5 mg daily on Thursdays; and 7.5 mg daily rest of week  (13 % change)    Instructed patient to follow up no later than: 7-10 days     Education provided: importance of therapeutic range, importance of following up for INR monitoring at instructed interval, importance of taking warfarin as instructed, potential interaction between warfarin and nortriptyline increase dose and tylenol , monitoring for bleeding signs and symptoms and when to seek medical attention/emergency care    Sandy verbalizes understanding and agrees to warfarin dosing plan.    Instructed to call the [facility] for any changes, questions or concerns. (#[phone])   ?   Angie Rice RN    Subjective/Objective:      Sandy ZIMMERMAN Tj, a 76 y.o. female is on warfarin.     Sandy reports:     Home warfarin dose: verbally confirmed home dose with Sandy and updated on anticoagulation calendar     Missed doses: No     Medication changes:  Yes: tylenol for headaches (son passed away been getting headaches continuously) nortriptyline dose increased     S/S of bleeding or thromboembolism:  No     New Injury or illness:  Yes: headaches     Changes in diet or alcohol consumption:  No     Upcoming surgery, procedure or cardioversion:  No    Anticoagulation Episode Summary     Current INR goal:   2.0-3.0   TTR:   30.2 % (1 y)   Next INR check:   8/12/2020   INR from last check:   4.80! (8/5/2020)  Yes   Weekly max warfarin dose:      Target end date:      INR check location:      Preferred lab:      Send INR reminders to:   [facility]    Indications    Other chronic pulmonary embolism without acute cor pulmonale (H) [I27.82]           Comments:            Anticoagulation Care Providers     Provider Role Specialty Phone number    Caitlyn Rees MD Referring Family Medicine [phone]

## 2022-05-02 NOTE — ED ADULT NURSE NOTE - CCCP TRG CHIEF CMPLNT
difficulty breathing [FreeTextEntry1] : Right Nasal Cauterization [FreeTextEntry2] : epistaxis  [FreeTextEntry3] : Procedure: Right Nasal Cauterization. After topical 4% xylocaine and oxymetazoline, the nasal vault was re-evaluated. Bleeding site identified. Cauterized with silver nitrate. Post-procedure instructions given. Patient tolerated procedure well

## 2022-12-02 NOTE — PROGRESS NOTE ADULT - PROBLEM/PLAN-2
DISPLAY PLAN FREE TEXT
Statement Selected

## 2022-12-09 NOTE — ED PROVIDER NOTE - CONSTITUTIONAL DEVELOPMENT, MLM
POORLY DEVELOPED 89 yo female presenting after fall. pt states door was partially opened and tripped and fell onto her left side. afterward complained of pain to left leg and hip. Unable to ambulate since. No h/o prior injuries to leg. Denies any head trauma. no n/v. no chest pain or sob. is not on any blood thinners. Did not take anything for pain prior to arrival. Patient was brought to [facility] for further evaluation and treatment. In the ED she was found to have a left hip fracture and is s/p an Open reduction and internal fixation of the left hip. Patient seen now resting comfortably. appears pain is well controlled. Patient remains confused. Patient found to be in new onset Afib Has been going back and forth between Afib and NSR      MEDICATIONS  (STANDING):  acetaminophen     Tablet .. 975 milliGRAM(s) Oral every 8 hours  apixaban 2.5 milliGRAM(s) Oral every 12 hours  atorvastatin 10 milliGRAM(s) Oral at bedtime  chlorhexidine 2% Cloths 1 Application(s) Topical <User Schedule>  influenza  Vaccine (HIGH DOSE) 0.7 milliLiter(s) IntraMuscular once  lisinopril 20 milliGRAM(s) Oral daily  metoprolol succinate ER 75 milliGRAM(s) Oral daily  polyethylene glycol 3350 17 Gram(s) Oral daily  QUEtiapine 12.5 milliGRAM(s) Oral at bedtime  senna 2 Tablet(s) Oral at bedtime  sodium chloride 0.9%. 1000 milliLiter(s) (125 mL/Hr) IV Continuous <Continuous>    MEDICATIONS  (PRN):  bisacodyl Suppository 10 milliGRAM(s) Rectal daily PRN If no bowel movement  magnesium hydroxide Suspension 30 milliLiter(s) Oral daily PRN Constipation  melatonin 3 milliGRAM(s) Oral at bedtime PRN Insomnia  QUEtiapine 12.5 milliGRAM(s) Oral daily PRN agitation          VITALS:   T(C): 36.7 (12-09-22 @ 12:08), Max: 36.8 (12-08-22 @ 16:46)  HR: 97 (12-09-22 @ 12:08) (70 - 97)  BP: 113/65 (12-09-22 @ 12:08) (113/65 - 174/77)  RR: 18 (12-09-22 @ 12:08) (18 - 18)  SpO2: 98% (12-09-22 @ 12:08) (94% - 98%)  Wt(kg): --    PHYSICAL EXAM:  GENERAL: NAD, well-groomed, well-developed  HEAD:  Atraumatic, Normocephalic  EYES: EOMI, PERRLA, conjunctiva and sclera clear  ENMT: No tonsillar erythema, exudates, or enlargement; Moist mucous membranes, Good dentition, No lesions  NECK: Supple, No JVD, Normal thyroid  NERVOUS SYSTEM:  Alert & Oriented X1,  Motor Strength 5/5 B/L upper and lower extremities; DTRs 2+ intact and symmetric  CHEST/LUNG: Clear to percussion bilaterally; No rales, rhonchi, wheezing, or rubs  HEART: Regular rate and rhythm; No murmurs, rubs, or gallops  ABDOMEN: Soft, Nontender, Nondistended; Bowel sounds present  EXTREMITIES:  2+ Peripheral Pulses, No clubbing, cyanosis, or edema  LYMPH: No lymphadenopathy noted  SKIN: No rashes or lesions    LABS:        CBC Full  -  ( 09 Dec 2022 09:37 )  WBC Count : 12.11 K/uL  RBC Count : 3.31 M/uL  Hemoglobin : 10.0 g/dL  Hematocrit : 30.3 %  Platelet Count - Automated : 284 K/uL  Mean Cell Volume : 91.5 fl  Mean Cell Hemoglobin : 30.2 pg  Mean Cell Hemoglobin Concentration : 33.0 gm/dL  Auto Neutrophil # : x  Auto Lymphocyte # : x  Auto Monocyte # : x  Auto Eosinophil # : x  Auto Basophil # : x  Auto Neutrophil % : x  Auto Lymphocyte % : x  Auto Monocyte % : x  Auto Eosinophil % : x  Auto Basophil % : x    12-09    138  |  105  |  14  ----------------------------<  98  4.1   |  22  |  0.53    Ca    9.2      09 Dec 2022 09:37  Mg     1.8     12-08      Patient name: MARGARITA BECERRA  YOB: 1930   Age: 89 (F)   MR#: [id number]  Study Date: 12/8/2022  Location: [address] [place], [place] [id number]: Serenity Sandra RDCS  Study quality: Technically fair  Referring Physician: Javon Blair MD  Blood Pressure: 113/66 mmHg  Height: 152 cm  Weight: 41 kg  BSA: 1.3 m2  Heart Rate: 79 mmHg  ------------------------------------------------------------------------  PROCEDURE: Transthoracic echocardiogram with 2-D, M-Mode  and complete spectral and color flow Doppler. Verbal  consent was obtained for injection of  Ultrasonic Enhancing  Agent following a discussion of risks and benefits.  Following intravenous injection of Ultrasonic Enhancing  Agent, harmonic imaging was performed.  INDICATION: Syncope and collapse (R55)  ------------------------------------------------------------------------  Dimensions:    Normal Values:  LA:     4.1    2.0 - 4.0 cm  Ao:     2.9    2.0 - 3.8 cm  SEPTUM: 1.0    0.6 - 1.2 cm  PWT:    1.1    0.6 - 1.1 cm  LVIDd:  4.2    3.0 - 5.6 cm  LVIDs:  3.4    1.8 - 4.0 cm  Derived variables:  LVMI: 111 g/m2  RWT: 0.52  Fractional short: 19 %  EF (Visual Estimate): 55-60 %  Doppler Peak Velocity (m/sec): AoV=1.5  ------------------------------------------------------------------------  Observations:  Mitral Valve: Mitral annular calcification, otherwise  normal mitral valve. Minimal mitral regurgitation.  Aortic Valve/Aorta: Calcified trileaflet aortic valve with  normal opening. Peak transaortic valve gradient equals 9 mm  Hg, mean transaortic valve gradient equals 5 mm Hg, aortic  valve velocity time integral equals 35 cm. Mild aortic  regurgitation.  Peak left ventricular outflow tract  gradient equals 3 mm Hg.  Aortic Root: 2.9 cm.  Left Atrium: Severely dilated left atrium.  LA volume index  = 50 cc/m2.  Left Ventricle: Normal left ventricular systolic function.  No segmental wall motion abnormalities. Visually, LVEF is  55-60%. Normal left ventricular internal dimensions and  wall thicknesses. Moderate diastolic dysfunction (Stage  II).  Right Heart: Normal right atrium. Normal right ventricular  size and function. Normal tricuspid valve. Minimal  tricuspid regurgitation. Normal pulmonic valve.  Pericardium/Pleura: No pericardial effusion seen.  Hemodynamic: Estimated right ventricular systolic pressure  equals 42 mm Hg, assuming right atrial pressure equals 15  mm Hg, consistent with mild pulmonary hypertension.  ------------------------------------------------------------------------  Conclusions:  1. Mitral annular calcification, otherwise normal mitral  valve. Minimal mitral regurgitation.  2. Calcified trileaflet aortic valve with normal opening.  Mild aortic regurgitation.  3. Severely dilated left atrium.  LA volume index = 50  cc/m2.Normal left ventricular internal dimensions and wall  thicknesses.Normal left ventricular systolic function. No  segmental wall motion abnormalities. Visually, LVEF is  55-60%. Moderate diastolic dysfunction (Stage II).  4. Normal right atrium. Normal right ventricular size and  function.  5. Normal tricuspid valve. Minimal tricuspid regurgitation.  Estimated pulmonary artery systolic pressure equals 42  mm  Hg, assuming right atrial pressure equals 15 mm Hg,  consistent with mild pulmonary pressures.  6. No pericardial effusion seen.  *** No previous Echo exam.

## 2023-02-27 NOTE — ED ADULT TRIAGE NOTE - BP NONINVASIVE DIASTOLIC (MM HG)
Group Psychotherapy (PhD/LCSW)    Location: [facility]    Clinical status of patient: Intensive Outpatient Program (IOP)    Date: 2/27/2023    Group Focus: Psychodynamic Group Psychotherapy    Length of service: [id number] - 45-50 minutes    Number of patients in attendance: 7    Referred by: Addictive Behavior Unit Treatment Team    Target symptoms: Substance Abuse    Patient's response to treatment: Active Listening and Self-disclosure    Progress toward goals: Progressing adequately    Interval History: Pt reports continued sobriety. She reviewed her progress in treatment.     Diagnosis: Cannabis Use Disorder; Alcohol Use Disorder in remission    Plan: Complete IOP program; follow up with aftercare plan.  
83

## 2023-03-20 NOTE — H&P ADULT - NSHPPOAPULMEMBOLUS_GEN_A_CORE
Comment: Ddx: tinea capitis vs alopecia areata
Detail Level: Simple
Render Risk Assessment In Note?: no
no

## 2023-04-04 NOTE — ED PROVIDER NOTE - RELIEVING FACTORS
Initial Anesthesia Post-op Note    Patient: Scar Michele  Procedure(s) Performed: COLONOSCOPY  Anesthesia type: MAC    Vitals Value Taken Time   Temp  04/04/23 1250   Pulse 62 04/04/23 1249   Resp 15 04/04/23 1249   SpO2 100 % 04/04/23 1249   BP 1229/79 04/04/23 1250   Vitals shown include unvalidated device data.         No notable events documented.  
n/a

## 2023-04-14 NOTE — ED PROVIDER NOTE - HEME LYMPH, MLM
No adenopathy or splenomegaly. No cervical or inguinal lymphadenopathy. Hypertriglyceridemia Monitoring: I explained this is common when taking isotretinoin. If this worsens they will contact us.

## 2023-12-08 NOTE — ED ADULT NURSE REASSESSMENT NOTE - TEMPLATE LIST FOR HEAD TO TOE ASSESSMENT
2023       [address] [place] [address]  Via In Basket      Patient: Gregg Green   YOB: 2007   Date of Visit: 2023       Dear Dr. Mercy Paula:    I saw your patient, Gregg Green, for an evaluation. Below are my notes for this visit with her. If you have questions, please do not hesitate to call me. Sincerely,        Candace Mark DO        CC: No Recipients  Candace Mark DO  2023  5:27 PM  Cosign Needed  Endocrinology Office Visit     Gregg Green  23  [id number]    CC:   Chief Complaint   Patient presents with   â¢ Office Visit   â¢ Diabetes Mellitus     Smbg 4x's on dexcom and tandem  Eye exam due    â¢ Other     Pt declined influenza and pneumococcal vaccines is due       Referring Provider: Dr Amilcar Brower    PCP: Dr Amilcar Brower     HPI/To Review  15year old female presents for evaluation of T1DM, patient is pregnant      Established care in our office on 2023 at the request of mazin carrasco and PCP as she is pregnant and unable to be seen by mazin carrasco due to pregnancy    During prior office visits since establishing care in our clinic, glycemic goals in pregnancy were discussed in pregnancy including but not limited to  labor, fetal macrosomia, preeclampsia and eclampsia, DKA, hypoglycemia during the last office visit. She was poorly controlled prior to pregnancy, and she remains at high risk for complications due to history of DM control. At a prior office visit, the decision was made to leave her insulin pump in automode, although not recommended for pregnancy. The reason being is her BS are HIGHER off automode. Insulin pump settings were also modified during that visit. INTERVAL EVENTS    Last seen in our clinic 2023. Since the last office visit, she was briefly in the hospital for nausea/vomiting, not due to hyperglycemia or hypoglycemia. Patient continues to have nausea and decreased appetite. General Patient did not receive a supply of Dexcom sensors for the month, patient called [facility] and they told her that she was not due for refills until later this month. The refills for sensors have been sent to [address] [place] [address] Road, she called [facility] and was they stated that the sensors would be ready for her today. Patient has not been wearing her insulin pump since Monday of this week, states that she does not like to wear the insulin pump without her Dexcom sensors. She plans to resume the pump today when she picks up the Dexcom sensors from the pharmacy. She has been giving her self basal/bolus insulin while off the insulin pump. Gives herself 45 units of the long-acting insulin and 8 units of Humalog with meals. She has a follow-up with ophthalmology on . Patient states that she is compliant with her mealtime insulin, however does not give the mealtime bolus 10 to 15 minutes before eating. She either gives the insulin at the time she is about to eat or shortly after eating her meal.      She has also been evaluated by ophthalmology, no evidence of retinopathy, they requesting that she return in 3 months for close follow-up.     Diabetes History:  Diagnosed with T1DM  In 2018, presented with DKA    Component      Latest Ref Rng 2018  4:42 PM 2018  5:10 PM   INSULIN, SERUM      munit/L 2 (E)    C-PEPTIDE      0.8 - 3.9 ng/mL 0.5 (L) (E)    Insulin Antibody  <0.4 (E)   Islet Cell Antibody 1:64 ! (E)    Glutamic Acid Decarboxylase AB  >250.0 (H) (E)       DKA 3-4 times since diagnosis      Last DKA >1 year ago         Previous DM medications:    Current insulin pump settings  -tandem insulin pump with dexcom G6 since   -humalog on insulin pump up to 100 units per day   Control IQ     Basal   12a 2.16  6a 1.8  9p 1.8     ICR   12a 1:7  6a 1:4  9p 1:6    ISF  12a 20  6a 20  9p 20     BG target 120  AIT 3 hours     SMBG continuous: Night  On control %  Pump inactive 38%  Avg   BG target:   7%very high  32% high  56% TIR  5%  low  0% very low  68% basal  32% food bolus  74% control IQ correction bolus   Glucose Trends: A lot of postprandial hyperglycemia, mostly after 3pm when she comes home from school and starts snacking and post dinner hyperglycemia. She is giving herself mealtime bolus after she starts eating. Receiving correction boluses via control IQ     Weight: Body mass index is 33.81 kg/mÂ². HbA1c: 7.5% 10/2023 ,7.9% 07/2023, 8.3% 12/2022, 10.6% 05/2022, 10.8% 07/2021    Polyuria denies   Polydipsia denies   Blurry vision denies   Paresthesias denies     Hypoglycemia awareness: yes, around 70     DM microvascular complications:   -Neuropathy: no  -Retinopathy: No, up-to-date with eye exam  -Nephropathy: no, last MALB/Cr   Microalbumin/ Creatinine Ratio (mg/g)   Date Value   11/30/2022 4.8       DM macrovascular complications:  -HTN: no  -HLD: no  -CAD: no  -CVA/TIA: no  -PAD/PVD: no    Hospitalizations related to DM: yes, see above     History of thyroid disease: no   TSH (mcUnits/mL)   Date Value   11/30/2022 1.902     Component      Latest Ref Rng 11/30/2022  6:06 PM   Tissue Transglutaminase Antibody IgA      <20 Units 4        Component      Latest Ref Rng 2/27/2018  4:13 PM   Thyroglobulin Antibody      0.0 - 4.0 unit/mL <0.9 (E)   THYROID PEROXIDASE AB      <60 unit/mL 45 (E)        PMH/PSH  Past Medical History:   Diagnosis Date   â¢ Annual physical exam 03/14/2022   â¢ Chlamydia infection affecting pregnancy in second trimester 10/31/2023   â¢ Diabetes mellitus (CMD) 2018    type 1 dm   â¢ Eczema 03/22/2017   â¢ Encounter for surveillance of transdermal patch hormonal contraceptive device 03/14/2022   â¢ Hypoglycemia 03/21/2018   â¢ Menorrhagia    â¢ Other chest pain 12/06/2022   â¢ Screen for STD (sexually transmitted disease) 03/14/2022   â¢ Trichomonas infection 10/31/2023    10-31-23 Rx for metronidazole bid x7 days, Rx for partner sent to pharmacy. Past Surgical History:   Procedure Laterality Date   â¢ Appendectomy  2020     Patient Active Problem List   Diagnosis   â¢ Overweight   â¢ Pre-existing type 1 diabetes affecting pregnancy, antepartum   â¢ Insulin pump titration   â¢ Insulin pump status   â¢ High risk teen pregnancy   â¢ Supervision of high risk pregnancy, antepartum   â¢ Hyperglycemia   â¢ Elevated blood sugar   â¢ Chlamydia infection affecting pregnancy in second trimester   â¢ Trichomonas infection       FH  Family History   Problem Relation Age of Onset   â¢ Patient is unaware of any medical problems Mother    â¢ Hypertension Father    â¢ Patient is unaware of any medical problems Sister    â¢ Patient is unaware of any medical problems Brother    â¢ Cirrhosis Maternal Grandfather    â¢ Diabetes Maternal Grandfather    â¢ Cancer Maternal Grandfather    â¢ Autoimmune Disease Paternal Grandmother    mother with preeclampsia     Social Hx  Negative for etoh  Negative for illicits  Negative for tobacco  High school student     ALLERGIES:   Allergen Reactions   â¢ Crab (Diagnostic) RASH   â¢ Shellfish Allergy   (Food Or Med) HIVES         ROS  A 12 Point ROS was negative except that listed in the HPI    Meds  Current Outpatient Medications   Medication Sig   â¢ famotidine (PEPCID) 20 MG tablet Take 1 tablet by mouth nightly as needed (Heartburn). â¢ ondansetron (ZOFRAN ODT) 4 MG disintegrating tablet Place 1 tablet onto the tongue every 8 hours as needed for Nausea. â¢ aspirin (ECOTRIN) 81 MG EC tablet Take 1 tablet by mouth daily. â¢ Continuous Blood Gluc Sensor (Dexcom G6 Sensor) Misc CHANGE SENSOR EVERY 10 DAYS   â¢ insulin detemir (Levemir FlexPen) 100 UNIT/ML pen-injector Indications: Insulin-Dependent Diabetes, Pregnancy Prime 2 units before each dose.  Inject 46 units daily in case pump fails   â¢ insulin lispro (HumaLOG) 100 UNIT/ML injectable solution INJECT UP  UNITS DAILY VIA PUMP   â¢ Continuous Blood Gluc Transmit (Dexcom G6 Transmitter) Misc 1 each "every 3 months. Change transmitter every 90 days. â¢ TRUEplus 5-Bevel Pen Needles 32G X 4 MM Misc Use to inject insulin 4-6 times daily. Remove needle cover(s) to expose needle before injecting. â¢ Prenatal Vit-Fe Sulfate-FA-DHA (Prenatal Vitamin/Min +DHA) 27-0.8-200 MG Cap Take 1 capsule by mouth daily. â¢ blood glucose (OneTouch Verio) test strip Test blood sugar 4 times daily. Diagnosis: Type 1 dm in pregnancy Meter: One Touch Verio. â¢ Lancets (OneTouch Delica Plus [id number]) Misc 1 each 4 times daily. â¢ Gvoke HypoPen 2-Pack 1 MG/0.2ML Solution Auto-injector INJECT 1MG SUBCUTANEOUSLY AS NEEDED . MAY REPEAT ONCE IN 15 MINUTES (FOR SEVERE HYPOGLYCEMIA/SEIZURES)   â¢ Ketostix strip Use to check urine for ketones when BG is > 300 twice in a row, 2 hours apart or when ill     No current facility-administered medications for this visit. Exam  Visit Vitals  /70 (BP Location: LUE - Left upper extremity, Patient Position: Sitting, Cuff Size: Regular)   Pulse 88   Ht 5' 5"" (1.651 m)   Wt 92.1 kg (203 lb 2.5 oz)   LMP 05/16/2023 (Approximate)   BMI 33.81 kg/mÂ²     Vitals Reviewed  General NAD, NCAT +acanthosis   HEENT- no exophthalmos   CV S1, S2, RRR  Resp CTA bilaterally, no wheezes rales rhonchi  Abd soft nontender nondistended,  lipohypertrophy is present in RLQ and LLQ abdomen  Ext no edema   MSK normal ROM   Skin no rashes, no ecchymoses  Neuro alert and oriented x 3   Psych cooperative noncombative    Labs/Diagnostics   -Reviewed in [facility] Everywhere  The Diabetes Tests flowsheet was reviewed.   Sodium (mmol/L)   Date Value   11/21/2023 137     Potassium (mmol/L)   Date Value   11/21/2023 3.2 (L)     Chloride (mmol/L)   Date Value   11/21/2023 107     Glucose (mg/dL)   Date Value   11/21/2023 126 (H)     Calcium (mg/dL)   Date Value   11/21/2023 8.2     Carbon Dioxide (mmol/L)   Date Value   11/21/2023 22     BUN (mg/dL)   Date Value   11/21/2023 8     Creatinine (mg/dL)   Date Value   11/21/2023 " 0.45     Cholesterol (mg/dL)   Date Value   2022 189 (H)     HDL (mg/dL)   Date Value   2022 78     Cholesterol/ HDL Ratio (no units)   Date Value   2022 2.4     Triglycerides (mg/dL)   Date Value   2022 100 (H)     LDL (mg/dL)   Date Value   2022 91     Hemoglobin A1C (%)   Date Value   10/11/2023 7.5 (H)     Microalbumin/ Creatinine Ratio (mg/g)   Date Value   2022 4.8     WBC (K/mcL)   Date Value   10/25/2023 6.2     RBC (mil/mcL)   Date Value   10/25/2023 3.35 (L)     HCT (%)   Date Value   10/25/2023 29.9 (L)     HGB (g/dL)   Date Value   10/25/2023 10.0 (L)     PLT (K/mcL)   Date Value   10/25/2023 142     Assessment/Plan    T1DM with hyperglycemia  -currently pregnant   Hemoglobin A1C (%)   Date Value   10/11/2023 7.5 (H)       insulin pump settings modified today (in bold)  -tandem insulin pump with dexcom G6 since   -humalog on insulin pump up to 100 units per day     Control IQ     Basal   12a 2.16  6a 1.8  9p 1.8     ICR   12a 7  6a 4  9p 6    ISF  12a  20  6a   20  9p   20    BG target 110  AIT 3 hours     -Continue current insulin pump settings. Patient was advised to bolus 10-15 minutes BEFORE meals.   -goal HbA1c is 6% without frequent lows due to pregnancy. Most recent A1c is 7.5% 10/2023. Next A1c due 2024  -we discussed glycemic goals with pregnancy, fbs 70-95 and 2 hours post meals <120  -we discussed potential complications of poorly controlled DM in pregnancy including but not limited to  labor, fetal macrosomia, preeclampsia and eclampsia, DKA, hypoglycemia during the last office visit  -Patient advised to wear her insulin pump without interruptions, even if she is not wearing her Dexcom sensor.   She should switch to manual mode if not wearing the sensor, and should switch back to auto mode if she has sensor in place.  -patient advised to bolus before meals.   - Lipohypertrophy present, change tandem site q3 days, alternate site  -Reviewed basal/bolus regimen, if pump malfunctions. She should take Lantus 45 units daily. Humalog with carb ratio of 1:4.  SSI with meals 1:25>100.   -MBG premeals and HS, goal BS is . If she is using fingerstick checks, she needs to write down her BG readings and bring logs to next office visit. - I advised if she is feeling hypoglycemic, then to proceed with treatment for hypoglycemia   -she is up to date with her diabetes eye exam, no evidence of retinopathy   -discussed if BS are not coming down with insulin pump, to change site and give external injection   -needs to carry glucagon at all times, has rx for glucagon, patient confirmed that she received it  -she is at high risk for complications due to history of DM control and this was discussed with her   -reviewed appropriate med administration technique  -to call if BS is <70 or >300  -bring log and meter to each visit   -discussed diet modifications   -discussed treatment for hypoglycemia with glucose tabs and/or 4oz of juice and wait 15 min to recheck BS and to repeat until BS>80  -[facility]    DM HM:   -ophtho: up to date, optho visit 12/2023  -renal: urine microalbumin levels ordered today  -bp: at goal   -hold off on lipid screening   -feet: stable     Incidental pregnancy  -Following with MFM  -mother with history of preeclampsia     RTC in 3 weeks in endocrine clinic, appointment has been scheduled. Thank you, Dr. Varsha Her, for allowing me to participate in the care of this patient! Kristina Cohen DO  Endocrinology Fellow PGY-4    I can be reached out via [employer] with any questions    Case was discussed with Dr. Sarah Martinez MD Who agrees with above assessment and plan .

## 2024-04-09 NOTE — PATIENT PROFILE ADULT. - PT NEEDS ASSIST
CHIEF COMPLAINT    Chief Complaint   Patient presents with    Hypertension     HPI  Otoniel Smith is a 45 y.o. female with history of [place], DM, hypertension who presents to the ED with complaints of some generalized fatigue with elevated blood pressure and some peripheral edema. Patient states over the last 2 to 3 days she has noticed some swelling to her ankles and feet. She states that over the last day she felt somewhat fatigued and tonight she checked her blood pressure like she does every evening and found her blood pressure to be elevated at 758 systolic. She had no symptoms of chest pain or shortness of breath but became very concerned to this elevated reading and presented to the emergency department for further evaluation. The patient takes Hyzaar, Cardura, and propranolol at home and states she has not missed any doses. Rates her symptoms as mild in severity without any alleviating or aggravating factors. Denies fevers, chills, dizziness, lightheadedness, shortness of breath, headache, nausea, vomiting      REVIEW OF SYSTEMS  Constitutional: No fever, chills or recent illness. Eye: No visual changes  HENT: No earache or sore throat. Resp: No SOB or productive cough. Cardio: No chest pain or palpitations. GI: No abdominal pain, nausea, vomiting, constipation or diarrhea. No melena. : No dysuria, urgency or frequency. Endocrine: No heat intolerance, no cold intolerance, no polydipsia   Lymphatics: No adenopathy  Musculoskeletal: Complains of ankle swelling  Neuro: No headaches. Psych: No homicidal or suicidal thoughts  Skin: No rash, No itching. ?  ?   PAST MEDICAL HISTORY  Past Medical History:   Diagnosis Date    Arthritis     Neck    Asthma     PCP - last episode: 2013    Diabetes mellitus ([facility] [address]     Type II - follows with PCP    Ehler's-Danlos syndrome Dx: 2000    PCP    Fibromyalgia     Hx of blood clots 2000    Left lower leg - unsure of treatment    Hypertension     Middle cerebral aneurysm 2002    surgery @ [facility] Mitral valve regurgitation     Originally saw Dr. Humberto Morales, now follows with PCP (updated 1/13/2021)    Raynauds syndrome     Short-term memory loss 2002    due to aneurysm per pt     FAMILY HISTORY  Family History   Problem Relation Age of Onset    Diabetes Mother     Diabetes Father     Heart Disease Father      SOCIAL HISTORY  Social History     Socioeconomic History    Marital status:      Spouse name: None    Number of children: None    Years of education: None    Highest education level: None   Occupational History    None   Tobacco Use    Smoking status: Never Smoker    Smokeless tobacco: Never Used   Vaping Use    Vaping Use: Never used   Substance and Sexual Activity    Alcohol use: Yes     Comment: Rarely - 1 time a year    Drug use: No    Sexual activity: None   Other Topics Concern    None   Social History Narrative    None     Social Determinants of Health     Financial Resource Strain:     Difficulty of Paying Living Expenses: Not on file   Food Insecurity:     Worried About Running Out of Food in the Last Year: Not on file    Josh of Food in the Last Year: Not on file   Transportation Needs:     Lack of Transportation (Medical): Not on file    Lack of Transportation (Non-Medical):  Not on file   Physical Activity:     Days of Exercise per Week: Not on file    Minutes of Exercise per Session: Not on file   Stress:     Feeling of Stress : Not on file   Social Connections:     Frequency of Communication with Friends and Family: Not on file    Frequency of Social Gatherings with Friends and Family: Not on file    Attends Buddhism Services: Not on file    Active Member of Clubs or Organizations: Not on file    Attends Club or Organization Meetings: Not on file    Marital Status: Not on file   Intimate Partner Violence:     Fear of Current or Ex-Partner: Not on file    Emotionally Abused: Not on file   Elham Montero Physically Abused: Not on file    Sexually Abused: Not on file   Housing Stability:     Unable to Pay for Housing in the Last Year: Not on file    Number of Places Lived in the Last Year: Not on file    Unstable Housing in the Last Year: Not on file       SURGICAL HISTORY  Past Surgical History:   Procedure Laterality Date    ANKLE SURGERY Right 2010    surgery x2   [address]    in left 5th digit    BRAIN SURGERY  2002    aneurysm repair   [address] COLONOSCOPY  12/2020    Diverticulosis - Dr. Jw Jc, COLON, DIAGNOSTIC  11/2020    \"Took biopsy and was irregular\", has to repeat in Feb 2021   [address] Left 1990's    repair left elbow    SHOULDER ARTHROSCOPY Right 1/19/2021    RIGHT SHOULDER ARTHROSCOPY, SUBACROMIAL DECOMPRESSION DISTAL CLAVICLE RESECTION ROTATOR CUFF REPAIR DEBRIDEMENT performed by Ramesh Diaz DO at [facility]      vein stripping on right x2 / vein stripping on left x8     CURRENT MEDICATIONS  Discharge Medication List as of 6/4/2022  2:05 AM      CONTINUE these medications which have NOT CHANGED    Details   methocarbamol (ROBAXIN) 750 MG tablet Take 1 tablet by mouth 3 times daily, Disp-30 tablet, R-0Normal      naproxen (NAPROSYN) 500 MG tablet Take 1 tablet by mouth 2 times daily, Disp-60 tablet, R-0Normal      medroxyPROGESTERone (DEPO-PROVERA) 150 MG/ML injection BRING TO OFFICE EVERY 3 MONTHS FOR INTRAMUSCULAR INJECTIONHistorical Med      oxybutynin (DITROPAN) 5 MG tablet TAKE 1 TABLET BY MOUTH TWO TIMES A DAYHistorical Med      fluticasone (FLONASE) 50 MCG/ACT nasal spray USE 2 SPRAYS IN EACH NOSTRIL ONCE DAILYHistorical Med      losartan-hydroCHLOROthiazide (HYZAAR) 100-25 MG per tablet TAKE 1 TABLET BY MOUTH IN THE MORNINGHistorical Med      doxycycline hyclate (VIBRA-TABS) 100 MG tablet TAKE 1 CAPSULE BY MOUTH TWO TIMES A DAY FOR 10 DAYSHistorical Med doxepin (SINEQUAN) 50 MG capsule TAKE 1 CAPSULE BY MOUTH IN THE EVENINGHistorical Med      clotrimazole-betamethasone (LOTRISONE) 1-0.05 % cream APPLY 1-2 GRAMS TOPICALLY TO THE AFFECTED AREA EVERY 12 HOURS AS NEEDED FOR INFLAMMATION, Historical Med      famotidine (PEPCID) 20 MG tablet Historical Med      meloxicam (MOBIC) 15 MG tablet Historical Med      doxazosin (CARDURA) 4 MG tablet TAKE 1 TABLET BY MOUTH AT BEDTIMEHistorical Med      Rimegepant Sulfate (NURTEC) 75 MG TBDP Take by mouthHistorical Med      TRULICITY 1.5 ZJ/1.2VT SOPN INJECT 1.5MG UNDER THE SKIN  AS DIRECTED EVERY WEEK, DAWHistorical Med      oxybutynin (DITROPAN-XL) 5 MG extended release tablet Take 5 mg by mouth 2 times dailyHistorical Med      losartan (COZAAR) 100 MG tablet Take 100 mg by mouth dailyHistorical Med      traZODone (DESYREL) 100 MG tablet Take 50 mg by mouth nightly as needed for Sleep Historical Med      ibuprofen (ADVIL;MOTRIN) 800 MG tablet Take 800 mg by mouth every 6 hours as needed for Pain Historical Med      glipiZIDE (GLUCOTROL) 10 MG tablet Take 10 mg by mouth dailyHistorical Med      tiZANidine (ZANAFLEX) 4 MG tablet Take 4 mg by mouth every 8 hours as needed Historical Med      rosuvastatin (CRESTOR) 20 MG tablet Take 20 mg by mouth dailyHistorical Med      acyclovir (ZOVIRAX) 400 MG tablet Historical Med      magnesium oxide (MAG-OX) 400 MG tablet Take 400 mg by mouth daily      potassium chloride SA (K-DUR;KLOR-CON M) 20 MEQ tablet Take 20 mEq by mouth 2 times daily Historical Med      vitamin D (MAXIMUM D3) 19689 UNITS CAPS capsule Take 10,000 Units by mouth daily (with breakfast) Historical Med      butalbital-acetaminophen-caffeine (FIORICET) per tablet Take 1 tablet by mouth every 4 hours as needed for Pain., Disp-180 tablet, R-0           ALLERGIES  Allergies   Allergen Reactions    Nubain [Nalbuphine Hcl]      Seizures      Secobarbital Sodium Anaphylaxis     SEIZURE    Sulfa Antibiotics Anaphylaxis    Adhesive Tape Rash    Demerol      seizures    Indocin [Indomethacin]        Nursing notes reviewed by myself for past medical history, family history, social history, surgical history, current medications, and allergies. PHYSICAL EXAM  VITAL SIGNS: Triage VS:    ED Triage Vitals [06/04/22 0040]   Enc Vitals Group      BP (!) 177/119      Heart Rate 82      Resp 18      Temp 98.1 °F (36.7 °C)      Temp Source Oral      SpO2 100 %      Weight       Height       Head Circumference       Peak Flow       Pain Score       Pain Loc       Pain Edu? Excl. in [address]? Constitutional: Well developed, Well nourished, non-toxic appearing  HENT: Normocephalic, Atraumatic, Bilateral external ears normal, Oropharynx moist, No oral exudates, Nose normal.   Eyes: PERRL, EOMI, Conjunctiva normal, No discharge. No scleral icterus. Neck: Normal range of motion, No tenderness, Supple. Lymphatic: No lymphadenopathy noted. Cardiovascular: Normal heart rate, Normal rhythm, No murmurs, gallops or rubs. Thorax & Lungs: Normal breath sounds, No respiratory distress, No wheezing. Skin: Warm, Dry, Pink, No mottling, No erythema, No rash. Back: No tenderness, No CVA tenderness. Extremities: Trace symmetrical, pedal edema bilaterally, No tenderness, No cyanosis, Normal perfusion, No clubbing. Musculoskeletal: Good range of motion in all major joints as observed. No major deformities noted. Neurologic: Alert & oriented x 3, No focal deficits noted. Psychiatric: Affect normal, Judgment normal, Mood normal.   EKG  Per my interpretation demonstrates normal sinus rhythm at a rate of 81 bpm.  Normal axis. Normal intervals. No acute ST segment changes.   RADIOLOGY  Labs Reviewed   CBC WITH AUTO DIFFERENTIAL - Abnormal; Notable for the following components:       Result Value    Hemoglobin 12.1 (*)     Lymphocytes % 45.3 (*)     Monocytes % 8.9 (*)     Eosinophils % 3.3 (*)     All other components within normal limits BASIC METABOLIC PANEL - Abnormal; Notable for the following components:    Sodium 134 (*)     Glucose 310 (*)     All other components within normal limits   MAGNESIUM - Abnormal; Notable for the following components:    Magnesium 1.7 (*)     All other components within normal limits   TROPONIN   BRAIN NATRIURETIC PEPTIDE     I personally reviewed the images. The radiologist's interpretation reveals:  Last Imaging results   XR CHEST PORTABLE    (Results Pending)     Procedures  NA  MEDS GIVEN IN ED:  Medications   doxazosin (CARDURA) tablet 4 mg (4 mg Oral Given 6/4/22 0202)   magnesium oxide (MAG-OX) tablet 400 mg (400 mg Oral Given 6/4/22 0202)     COURSE & MEDICAL DECISION MAKING    59-year-old female presents emergency department complaints of generalized fatigue, elevated blood pressure, and peripheral edema. No chest pain or shortness of breath. Initial vital signs demonstrate elevated blood pressure of 177/119. She is saturating 100 percent room air. Her lungs are clear to auscultation bilaterally. She does have symmetrical trace pedal edema bilaterally. At this time we will obtain CBC, BMP, EKG, troponin, BNP, chest x-ray. CBC is reassuring. BMP demonstrates hyperglycemia without anion gap. EKG is without acute ischemic changes and troponin is nonelevated. BNP is not elevated. Chest x-ray per my interpretation is without acute cardiopulmonary pathology. Patient's magnesium level was found to be mildly decreased at 1.7. She was ordered an extra dose of Cardura here as well as magnesium oxide. Her blood pressure did improve while here to 147/73 and at this time I feel patient is appropriate for discharge home. Instructed to call her primary care provider on Monday for follow-up appointment. Return precautions provided.       Amount and/or Complexity of Data Reviewed  Clinical lab tests: reviewed  Decide to obtain previous medical records or to obtain history from someone other than the patient: yes       -  Patient seen and evaluated in the emergency department. -  Triage and nursing notes reviewed and incorporated. -  Old chart records reviewed and incorporated. -  Work-up included:  See above  -  Results discussed with patient. Appropriate PPE utilized as indicated for entire patient encounter? Time of Disposition: See timeline  ? Discharge Medication List as of 6/4/2022  2:05 AM        FINAL IMPRESSION  1. Uncontrolled hypertension    2. Peripheral edema    3. Type 2 diabetes mellitus with hyperglycemia, with long-term current use of insulin ([facility] [address]    4. Hypomagnesemia        Electronically signed by:  [address], DO, 6/4/2022         [address], DO  06/04/22 2361 3 = A little assistance yes
